# Patient Record
Sex: FEMALE | Race: WHITE | Employment: UNEMPLOYED | URBAN - METROPOLITAN AREA
[De-identification: names, ages, dates, MRNs, and addresses within clinical notes are randomized per-mention and may not be internally consistent; named-entity substitution may affect disease eponyms.]

---

## 2018-01-02 ENCOUNTER — OFFICE VISIT (OUTPATIENT)
Dept: ORTHOPEDIC SURGERY | Age: 53
End: 2018-01-02

## 2018-01-02 VITALS — HEIGHT: 66 IN | RESPIRATION RATE: 16 BRPM | WEIGHT: 189 LBS | BODY MASS INDEX: 30.37 KG/M2

## 2018-01-02 DIAGNOSIS — M77.11 LATERAL EPICONDYLITIS OF RIGHT ELBOW: Primary | ICD-10-CM

## 2018-01-02 DIAGNOSIS — R52 PAIN: ICD-10-CM

## 2018-01-02 PROCEDURE — 99203 OFFICE O/P NEW LOW 30 MIN: CPT | Performed by: PHYSICIAN ASSISTANT

## 2018-01-02 PROCEDURE — 73080 X-RAY EXAM OF ELBOW: CPT | Performed by: PHYSICIAN ASSISTANT

## 2018-01-02 RX ORDER — LEVOTHYROXINE SODIUM 0.1 MG/1
100 TABLET ORAL DAILY
COMMUNITY
End: 2018-02-16 | Stop reason: SDUPTHER

## 2018-01-02 RX ORDER — LISINOPRIL 10 MG/1
10 TABLET ORAL DAILY
COMMUNITY
End: 2018-02-12 | Stop reason: SDUPTHER

## 2018-01-02 RX ORDER — MELOXICAM 7.5 MG/1
7.5 TABLET ORAL DAILY
Qty: 30 TABLET | Refills: 3 | Status: SHIPPED | OUTPATIENT
Start: 2018-01-02 | End: 2018-02-12

## 2018-01-03 ENCOUNTER — TELEPHONE (OUTPATIENT)
Dept: ORTHOPEDIC SURGERY | Age: 53
End: 2018-01-03

## 2018-01-03 NOTE — PROGRESS NOTES
History   Problem Relation Age of Onset    Cancer Mother     Diabetes Mother     Hypertension Mother     Stroke Mother     Thyroid Disease Mother     Kidney Disease Mother     Glaucoma Mother     Cancer Father     Heart Disease Father     Obesity Brother     Thyroid Disease Brother     Kidney Disease Brother        Social History     Social History    Marital status: N/A     Spouse name: N/A    Number of children: N/A    Years of education: N/A     Social History Main Topics    Smoking status: None    Smokeless tobacco: None    Alcohol use None    Drug use: Unknown    Sexual activity: Not Asked     Other Topics Concern    None     Social History Narrative    None         ORTHOPEDIC CONSTITUTION EXAM:     Vital Signs:  Resp 16   Ht 5' 6\" (1.676 m)   Wt 189 lb (85.7 kg)   BMI 30.51 kg/m²     Constitution:  Generally, the patient is [x] alert, [x] appears stated age, and [x] in no distress. General body habitus is:   []Cachectic  []Thin  []Normal  []Overweight  []Obese  []Morbidly Obese     Psychiatric:   Judgement and insight is:  [x]Good    []Poor  Oriented to:  [x]person, [x]place, [x]time. Mood for circumstances is:  [x]Appropriate   []Inappropriate    Gait and Station:   Gait is:  [x]Normal  []Antalgic   []Trendelenburg   []Wide   []Unsteady   []Other:  Assistive Device:  []Cane    []Crutches    []Walker    []Wheelchair    []Gurney  Weight bearing on injured extremity:  [x]Is able   []Is not able    ORTHOPEDIC ELBOW EXAM:     ELBOW EXAM: [x]Right []Left  Circulation:  [x] The limb is warm and well perfused. [x] Capillary refill is intact. [] Edema:    Inspection:  [x] Skin intact without abrasion or lacerations.   [x] Normal elbow alignment:  [] Abnormal alignment:  [] Ecchymosis:  [] Abrasion:  [] Laceration:   [] Scar / Surgical incision:  [] Orthopedic appliance:     Range of Motion:   [x] Normal Elbow AROM     [x] Normal Elbow PROM     [] Deferred due to fracture  Active Elbow

## 2018-01-03 NOTE — TELEPHONE ENCOUNTER
Pt stats that she did not get the opportunity to review MRI results with you yesterday during office visit since results were not available.  Please review and call pt at [phone]

## 2018-01-12 NOTE — TELEPHONE ENCOUNTER
[address] [place] [address] - [address] [place] [address]

## 2018-01-15 ENCOUNTER — HOSPITAL ENCOUNTER (OUTPATIENT)
Dept: OCCUPATIONAL THERAPY | Age: 53
Discharge: HOME | End: 2018-01-31
Attending: PHYSICIAN ASSISTANT | Admitting: PHYSICIAN ASSISTANT

## 2018-01-15 ASSESSMENT — 9 HOLE PEG TEST
TESTTIME_SECONDS: 26.43
TEST_RESULT: FUNCTIONAL
TESTTIME_SECONDS: 23.66

## 2018-01-15 ASSESSMENT — PAIN DESCRIPTION - LOCATION: LOCATION: ELBOW

## 2018-01-17 RX ORDER — ACETAMINOPHEN 325 MG/1
650 TABLET ORAL EVERY 4 HOURS PRN
Qty: 120 TABLET | Refills: 3 | OUTPATIENT
Start: 2018-01-17

## 2018-01-17 RX ORDER — NAPROXEN 500 MG/1
500 TABLET ORAL 2 TIMES DAILY WITH MEALS
Qty: 60 TABLET | Refills: 3 | OUTPATIENT
Start: 2018-01-17

## 2018-02-01 ENCOUNTER — HOSPITAL ENCOUNTER (OUTPATIENT)
Dept: OCCUPATIONAL THERAPY | Age: 53
Discharge: HOME | End: 2018-02-28
Attending: PHYSICIAN ASSISTANT | Admitting: PHYSICIAN ASSISTANT

## 2018-02-02 ENCOUNTER — OFFICE VISIT (OUTPATIENT)
Dept: ORTHOPEDIC SURGERY | Age: 53
End: 2018-02-02

## 2018-02-02 VITALS — RESPIRATION RATE: 16 BRPM | HEIGHT: 66 IN | WEIGHT: 189 LBS | BODY MASS INDEX: 30.37 KG/M2

## 2018-02-02 DIAGNOSIS — M77.11 LATERAL EPICONDYLITIS OF RIGHT ELBOW: Primary | ICD-10-CM

## 2018-02-02 PROCEDURE — 99213 OFFICE O/P EST LOW 20 MIN: CPT | Performed by: ORTHOPAEDIC SURGERY

## 2018-02-02 ASSESSMENT — ENCOUNTER SYMPTOMS
GASTROINTESTINAL NEGATIVE: 1
RESPIRATORY NEGATIVE: 1
EYES NEGATIVE: 1

## 2018-02-02 NOTE — PROGRESS NOTES
finger/wrist extension      MRI RIGHT ELBOW 12/26/17          No x-rays taken in the office today. Impression:  right elbow lateral epicondylitis       Plan:    The most likely impression, expected course, diagnostic and treatment options were discussed, will proceed with:  Natural history and expected course discussed. Questions answered.     Wear strap below elbow as needed   Continue physical therapy   Avoid aggravating activity   Follow up as needed for any persistent symptoms

## 2018-02-12 ENCOUNTER — OFFICE VISIT (OUTPATIENT)
Dept: FAMILY MEDICINE CLINIC | Age: 53
End: 2018-02-12

## 2018-02-12 VITALS
WEIGHT: 209.4 LBS | RESPIRATION RATE: 16 BRPM | HEIGHT: 65 IN | BODY MASS INDEX: 34.89 KG/M2 | HEART RATE: 69 BPM | DIASTOLIC BLOOD PRESSURE: 82 MMHG | SYSTOLIC BLOOD PRESSURE: 128 MMHG | OXYGEN SATURATION: 97 %

## 2018-02-12 DIAGNOSIS — Z00.00 ROUTINE HEALTH MAINTENANCE: Primary | ICD-10-CM

## 2018-02-12 DIAGNOSIS — Z13.1 ENCOUNTER FOR SCREENING FOR DIABETES MELLITUS: ICD-10-CM

## 2018-02-12 DIAGNOSIS — E03.9 HYPOTHYROIDISM, UNSPECIFIED TYPE: ICD-10-CM

## 2018-02-12 DIAGNOSIS — Z12.11 SCREENING FOR COLON CANCER: ICD-10-CM

## 2018-02-12 DIAGNOSIS — I10 ESSENTIAL HYPERTENSION: ICD-10-CM

## 2018-02-12 DIAGNOSIS — Z12.31 ENCOUNTER FOR SCREENING MAMMOGRAM FOR BREAST CANCER: ICD-10-CM

## 2018-02-12 DIAGNOSIS — E04.1 THYROID NODULE: ICD-10-CM

## 2018-02-12 DIAGNOSIS — Z00.00 ROUTINE HEALTH MAINTENANCE: ICD-10-CM

## 2018-02-12 LAB
A/G RATIO: 1.4 (ref 1.1–2.2)
ALBUMIN SERPL-MCNC: 4.2 G/DL (ref 3.4–5)
ALP BLD-CCNC: 77 U/L (ref 40–129)
ALT SERPL-CCNC: 20 U/L (ref 10–40)
ANION GAP SERPL CALCULATED.3IONS-SCNC: 14 MMOL/L (ref 3–16)
AST SERPL-CCNC: 21 U/L (ref 15–37)
BILIRUB SERPL-MCNC: <0.2 MG/DL (ref 0–1)
BUN BLDV-MCNC: 11 MG/DL (ref 7–20)
CALCIUM SERPL-MCNC: 9.2 MG/DL (ref 8.3–10.6)
CHLORIDE BLD-SCNC: 104 MMOL/L (ref 99–110)
CHOLESTEROL, TOTAL: 191 MG/DL (ref 0–199)
CO2: 24 MMOL/L (ref 21–32)
CREAT SERPL-MCNC: 0.8 MG/DL (ref 0.6–1.1)
GFR AFRICAN AMERICAN: >60
GFR NON-AFRICAN AMERICAN: >60
GLOBULIN: 3.1 G/DL
GLUCOSE BLD-MCNC: 91 MG/DL (ref 70–99)
HCT VFR BLD CALC: 43.3 % (ref 36–48)
HDLC SERPL-MCNC: 34 MG/DL (ref 40–60)
HEMOGLOBIN: 15 G/DL (ref 12–16)
HEPATITIS C ANTIBODY INTERPRETATION: NORMAL
LDL CHOLESTEROL CALCULATED: 121 MG/DL
MCH RBC QN AUTO: 30.9 PG (ref 26–34)
MCHC RBC AUTO-ENTMCNC: 34.6 G/DL (ref 31–36)
MCV RBC AUTO: 89.2 FL (ref 80–100)
PDW BLD-RTO: 13.4 % (ref 12.4–15.4)
PLATELET # BLD: 241 K/UL (ref 135–450)
PMV BLD AUTO: 9.5 FL (ref 5–10.5)
POTASSIUM SERPL-SCNC: 4.5 MMOL/L (ref 3.5–5.1)
RBC # BLD: 4.85 M/UL (ref 4–5.2)
SODIUM BLD-SCNC: 142 MMOL/L (ref 136–145)
TOTAL PROTEIN: 7.3 G/DL (ref 6.4–8.2)
TRIGL SERPL-MCNC: 178 MG/DL (ref 0–150)
TSH REFLEX: 0.83 UIU/ML (ref 0.27–4.2)
VLDLC SERPL CALC-MCNC: 36 MG/DL
WBC # BLD: 8.6 K/UL (ref 4–11)

## 2018-02-12 PROCEDURE — 3017F COLORECTAL CA SCREEN DOC REV: CPT | Performed by: NURSE PRACTITIONER

## 2018-02-12 PROCEDURE — 99213 OFFICE O/P EST LOW 20 MIN: CPT | Performed by: NURSE PRACTITIONER

## 2018-02-12 PROCEDURE — 4004F PT TOBACCO SCREEN RCVD TLK: CPT | Performed by: NURSE PRACTITIONER

## 2018-02-12 PROCEDURE — G8417 CALC BMI ABV UP PARAM F/U: HCPCS | Performed by: NURSE PRACTITIONER

## 2018-02-12 PROCEDURE — 3014F SCREEN MAMMO DOC REV: CPT | Performed by: NURSE PRACTITIONER

## 2018-02-12 PROCEDURE — G8484 FLU IMMUNIZE NO ADMIN: HCPCS | Performed by: NURSE PRACTITIONER

## 2018-02-12 PROCEDURE — G8427 DOCREV CUR MEDS BY ELIG CLIN: HCPCS | Performed by: NURSE PRACTITIONER

## 2018-02-12 RX ORDER — LISINOPRIL 10 MG/1
10 TABLET ORAL DAILY
Qty: 90 TABLET | Refills: 1 | Status: SHIPPED | OUTPATIENT
Start: 2018-02-12 | End: 2018-05-14 | Stop reason: SDUPTHER

## 2018-02-12 ASSESSMENT — ENCOUNTER SYMPTOMS
WHEEZING: 0
NAUSEA: 0
COUGH: 0
SHORTNESS OF BREATH: 0
VOMITING: 0
CONSTIPATION: 0
CHEST TIGHTNESS: 0
DIARRHEA: 0
ABDOMINAL PAIN: 0

## 2018-02-12 ASSESSMENT — PATIENT HEALTH QUESTIONNAIRE - PHQ9
2. FEELING DOWN, DEPRESSED OR HOPELESS: 0
SUM OF ALL RESPONSES TO PHQ QUESTIONS 1-9: 0
1. LITTLE INTEREST OR PLEASURE IN DOING THINGS: 0
SUM OF ALL RESPONSES TO PHQ9 QUESTIONS 1 & 2: 0

## 2018-02-12 NOTE — PROGRESS NOTES
SUBJECTIVE:    Yvrose Watts  1965  46 y.o.  female      Chief Complaint   Patient presents with    Establish Care     HPI    Allergies   Allergen Reactions    Meloxicam Shortness Of Breath    Keflex [Cephalexin]     Penicillins     Statins     Sulfa Antibiotics     Codeine Nausea And Vomiting     Past Medical History:   Diagnosis Date    Hypertension     Hypothyroidism      Past Surgical History:   Procedure Laterality Date    CARPAL TUNNEL RELEASE Bilateral 2003    CHOLECYSTECTOMY      CHOLECYSTECTOMY  2013    ECTOPIC PREGNANCY SURGERY  1991    TUBAL LIGATION  1991     Social History     Tobacco History     Smoking Status  Current Every Day Smoker Smoking Frequency  0.5 packs/day for 20 years (10 pk yrs) Smoking Tobacco Type  Cigarettes    Smokeless Tobacco Use  Never Used          Alcohol History     Alcohol Use Status  No          Drug Use     Drug Use Status  No          Sexual Activity     Sexually Active  Yes Partners  Male                Problem List Items Addressed This Visit     Hypothyroidism     Diagnosed approximately 10 years ago  Stable at current dosage for most of that time  Denies heat or cold intolerance  Feels as though her hair is thinning    Thyroid \"feels off\", \"lumpy\"  Noticed it 4-5 months ago         Essential hypertension     Diagnosed approximately 10 years ago  Stable at current dosage for many years  Patient denies any exertional chest pain, dyspnea, palpitations, syncope, orthopnea, edema or paroxysmal nocturnal dyspnea.            Relevant Medications    lisinopril (PRINIVIL;ZESTRIL) 10 MG tablet      Other Visit Diagnoses     Routine health maintenance    -  Primary    Relevant Orders    CBC    Comprehensive Metabolic Panel    Lipid Panel    TSH with Reflex    Hepatitis C Antibody    HIV Screen    Encounter for screening mammogram for breast cancer        Relevant Orders    JING Digital Screen Bilateral [[id number]]    Screening for colon cancer        Relevant Orders no edema. Lymphadenopathy:     She has no cervical adenopathy. Right cervical: No superficial cervical and no posterior cervical adenopathy present. Left cervical: No superficial cervical and no posterior cervical adenopathy present. Neurological: She is alert and oriented to person, place, and time. No cranial nerve deficit. Skin: Skin is warm and dry. Psychiatric: She has a normal mood and affect. Her behavior is normal.       ASSESSMENT/PLAN:    1. Encounter for screening mammogram for breast cancer  - JING Digital Screen Bilateral [[id number]]; Future    2. Screening for colon cancer  - POCT FECAL IMMUNOCHEMICAL TEST (FIT); Future  - [address] [facility]- Yunior Richardson MD    3. Encounter for screening for diabetes mellitus    4. Hypothyroidism, unspecified type  Recheck today  Will hold off on refills until see tsh results    5. Essential hypertension  Lisinopril refilled    6. Routine health maintenance  - CBC; Future  - Comprehensive Metabolic Panel; Future  - Lipid Panel; Future  - TSH with Reflex; Future  - Hepatitis C Antibody; Future  - HIV Screen; Future    7. Thyroid nodule  - US Head Neck Soft Tissue Thyroid; Future      Controlled Substances Monitoring:          Return in about 3 months (around 5/12/2018).       (Please note that portions of this note may have been completed with a voice recognition program. Efforts were made to edit the dictations but occasionally words are mis-transcribed.)

## 2018-02-13 LAB
HIV AG/AB: NORMAL
HIV ANTIGEN: NORMAL
HIV-1 ANTIBODY: NORMAL
HIV-2 AB: NORMAL

## 2018-02-16 ENCOUNTER — TELEPHONE (OUTPATIENT)
Dept: FAMILY MEDICINE CLINIC | Age: 53
End: 2018-02-16

## 2018-02-19 RX ORDER — LEVOTHYROXINE SODIUM 0.1 MG/1
100 TABLET ORAL DAILY
Qty: 90 TABLET | Refills: 1 | Status: SHIPPED | OUTPATIENT
Start: 2018-02-19 | End: 2018-05-14 | Stop reason: SDUPTHER

## 2018-02-19 RX ORDER — EZETIMIBE 10 MG/1
10 TABLET ORAL DAILY
Qty: 30 TABLET | Refills: 3 | Status: SHIPPED | OUTPATIENT
Start: 2018-02-19 | End: 2018-05-14 | Stop reason: SDUPTHER

## 2018-02-19 NOTE — TELEPHONE ENCOUNTER
Will try zetia to help lower her cholesterol and decrease her CVD risk    The 10-year ASCVD risk score (Brooke Hector et al., 2013) is: 10.4%    Values used to calculate the score:      Age: 46 years      Sex: Female      Is Non- : No      Diabetic: No      Tobacco smoker: Yes      Systolic Blood Pressure: 233 mmHg      Is BP treated: Yes      HDL Cholesterol: 34 mg/dL      Total Cholesterol: 191 mg/dL      Patient should also make dietary changes to improve cholesterol.  Will also benefit from physical activity

## 2018-02-21 ENCOUNTER — TELEPHONE (OUTPATIENT)
Dept: GASTROENTEROLOGY | Age: 53
End: 2018-02-21

## 2018-02-21 DIAGNOSIS — Z12.11 SCREENING FOR COLON CANCER: ICD-10-CM

## 2018-02-21 LAB
CONTROL: PRESENT
HEMOCCULT STL QL: NEGATIVE

## 2018-02-22 ENCOUNTER — HOSPITAL ENCOUNTER (OUTPATIENT)
Dept: MAMMOGRAPHY | Age: 53
Discharge: HOME | End: 2018-02-22
Attending: NURSE PRACTITIONER | Admitting: NURSE PRACTITIONER

## 2018-02-22 DIAGNOSIS — E04.1 THYROID NODULE: ICD-10-CM

## 2018-02-22 DIAGNOSIS — Z12.31 ENCOUNTER FOR SCREENING MAMMOGRAM FOR BREAST CANCER: ICD-10-CM

## 2018-02-22 DIAGNOSIS — Z12.31 ENCOUNTER FOR SCREENING MAMMOGRAM FOR MALIGNANT NEOPLASM OF BREAST: ICD-10-CM

## 2018-02-22 DIAGNOSIS — E04.9 ENLARGED THYROID: Primary | ICD-10-CM

## 2018-02-27 ENCOUNTER — TELEPHONE (OUTPATIENT)
Dept: FAMILY MEDICINE CLINIC | Age: 53
End: 2018-02-27

## 2018-02-28 ENCOUNTER — TELEPHONE (OUTPATIENT)
Dept: FAMILY MEDICINE CLINIC | Age: 53
End: 2018-02-28

## 2018-02-28 RX ORDER — FENOFIBRATE 145 MG/1
145 TABLET, COATED ORAL DAILY
Qty: 30 TABLET | Refills: 3 | Status: SHIPPED | OUTPATIENT
Start: 2018-02-28 | End: 2018-05-14 | Stop reason: CLARIF

## 2018-02-28 NOTE — TELEPHONE ENCOUNTER
Patient called back and left DENA stating [address] advised her both cholesterol medications Zetia & Tricor are not covered. The cost of Zetia is $80 & Tricor $140. Patient will be going on the  website to see if she can download a coupon to help with cost. She will be paying for the Zetia today, because she needs it and only due to getting her taxes back could she afford to. Patient is requesting that we keep trying to get either medication approved with her insurance company.

## 2018-02-28 NOTE — TELEPHONE ENCOUNTER
Patient called in, stated that she wants on a prescription and wants it now because she is stressing out bad. She stated she just found out today that mom had Graves and multiple heart attacks. She wants to know what PCP is willing to do for her because she does not want to go without medication.

## 2018-03-01 ENCOUNTER — HOSPITAL ENCOUNTER (OUTPATIENT)
Dept: OCCUPATIONAL THERAPY | Age: 53
Discharge: HOME | End: 2018-03-31
Attending: PHYSICIAN ASSISTANT | Admitting: PHYSICIAN ASSISTANT

## 2018-03-05 ENCOUNTER — TELEPHONE (OUTPATIENT)
Dept: FAMILY MEDICINE CLINIC | Age: 53
End: 2018-03-05

## 2018-03-05 NOTE — TELEPHONE ENCOUNTER
Patient called stating that she received a denial letter from [facility] for the Generic form of Zetia. States on the letter, that she needs to try statins first. Patient states she is allergic to all statins. [facility] told her to have us send over a letter with our letterhead on it, stating she is allergic to all statins and they would review and make a decision within 24 hours on the Generic Zetia. She said the RX Dept Fax# is [phone].

## 2018-03-13 DIAGNOSIS — E04.9 ENLARGEMENT OF THYROID: Primary | ICD-10-CM

## 2018-04-01 ENCOUNTER — HOSPITAL ENCOUNTER (OUTPATIENT)
Dept: OCCUPATIONAL THERAPY | Age: 53
Discharge: HOME | End: 2018-04-30
Attending: PHYSICIAN ASSISTANT | Admitting: PHYSICIAN ASSISTANT

## 2018-04-13 ENCOUNTER — OFFICE VISIT (OUTPATIENT)
Dept: ORTHOPEDIC SURGERY | Age: 53
End: 2018-04-13

## 2018-04-13 VITALS — RESPIRATION RATE: 14 BRPM | WEIGHT: 200 LBS | HEIGHT: 66 IN | BODY MASS INDEX: 32.14 KG/M2

## 2018-04-13 DIAGNOSIS — M77.11 LATERAL EPICONDYLITIS OF RIGHT ELBOW: Primary | ICD-10-CM

## 2018-04-13 PROCEDURE — 20605 DRAIN/INJ JOINT/BURSA W/O US: CPT | Performed by: ORTHOPAEDIC SURGERY

## 2018-04-13 PROCEDURE — 3014F SCREEN MAMMO DOC REV: CPT | Performed by: ORTHOPAEDIC SURGERY

## 2018-04-13 PROCEDURE — G8427 DOCREV CUR MEDS BY ELIG CLIN: HCPCS | Performed by: ORTHOPAEDIC SURGERY

## 2018-04-13 PROCEDURE — 20551 NJX 1 TENDON ORIGIN/INSJ: CPT | Performed by: ORTHOPAEDIC SURGERY

## 2018-04-13 PROCEDURE — G8417 CALC BMI ABV UP PARAM F/U: HCPCS | Performed by: ORTHOPAEDIC SURGERY

## 2018-04-13 PROCEDURE — 3017F COLORECTAL CA SCREEN DOC REV: CPT | Performed by: ORTHOPAEDIC SURGERY

## 2018-04-13 PROCEDURE — 4004F PT TOBACCO SCREEN RCVD TLK: CPT | Performed by: ORTHOPAEDIC SURGERY

## 2018-04-13 ASSESSMENT — ENCOUNTER SYMPTOMS
GASTROINTESTINAL NEGATIVE: 1
EYES NEGATIVE: 1
RESPIRATORY NEGATIVE: 1

## 2018-04-25 ENCOUNTER — TELEPHONE (OUTPATIENT)
Dept: ORTHOPEDIC SURGERY | Age: 53
End: 2018-04-25

## 2018-05-01 ENCOUNTER — HOSPITAL ENCOUNTER (OUTPATIENT)
Dept: OCCUPATIONAL THERAPY | Age: 53
Discharge: HOME | End: 2018-05-31
Attending: PHYSICIAN ASSISTANT | Admitting: PHYSICIAN ASSISTANT

## 2018-05-14 ENCOUNTER — OFFICE VISIT (OUTPATIENT)
Dept: FAMILY MEDICINE CLINIC | Age: 53
End: 2018-05-14

## 2018-05-14 VITALS
HEART RATE: 67 BPM | RESPIRATION RATE: 16 BRPM | WEIGHT: 210.4 LBS | SYSTOLIC BLOOD PRESSURE: 128 MMHG | OXYGEN SATURATION: 98 % | BODY MASS INDEX: 33.96 KG/M2 | DIASTOLIC BLOOD PRESSURE: 82 MMHG

## 2018-05-14 DIAGNOSIS — Z00.00 ROUTINE HEALTH MAINTENANCE: Primary | ICD-10-CM

## 2018-05-14 DIAGNOSIS — Z82.49 FAMILY HISTORY OF HYPERTROPHIC CARDIOMYOPATHY: ICD-10-CM

## 2018-05-14 DIAGNOSIS — I10 ESSENTIAL HYPERTENSION: ICD-10-CM

## 2018-05-14 DIAGNOSIS — Z23 NEED FOR PROPHYLACTIC VACCINATION AGAINST STREPTOCOCCUS PNEUMONIAE (PNEUMOCOCCUS): ICD-10-CM

## 2018-05-14 DIAGNOSIS — Z00.00 ROUTINE HEALTH MAINTENANCE: ICD-10-CM

## 2018-05-14 DIAGNOSIS — E03.9 HYPOTHYROIDISM, UNSPECIFIED TYPE: ICD-10-CM

## 2018-05-14 DIAGNOSIS — Z72.0 TOBACCO ABUSE: ICD-10-CM

## 2018-05-14 LAB
CHOLESTEROL, TOTAL: 162 MG/DL (ref 0–199)
HDLC SERPL-MCNC: 37 MG/DL (ref 40–60)
LDL CHOLESTEROL CALCULATED: 97 MG/DL
TRIGL SERPL-MCNC: 141 MG/DL (ref 0–150)
TSH REFLEX: 1.46 UIU/ML (ref 0.27–4.2)
VLDLC SERPL CALC-MCNC: 28 MG/DL

## 2018-05-14 PROCEDURE — 99214 OFFICE O/P EST MOD 30 MIN: CPT | Performed by: NURSE PRACTITIONER

## 2018-05-14 PROCEDURE — 90471 IMMUNIZATION ADMIN: CPT | Performed by: NURSE PRACTITIONER

## 2018-05-14 PROCEDURE — 3017F COLORECTAL CA SCREEN DOC REV: CPT | Performed by: NURSE PRACTITIONER

## 2018-05-14 PROCEDURE — 3014F SCREEN MAMMO DOC REV: CPT | Performed by: NURSE PRACTITIONER

## 2018-05-14 PROCEDURE — 4004F PT TOBACCO SCREEN RCVD TLK: CPT | Performed by: NURSE PRACTITIONER

## 2018-05-14 PROCEDURE — G8427 DOCREV CUR MEDS BY ELIG CLIN: HCPCS | Performed by: NURSE PRACTITIONER

## 2018-05-14 PROCEDURE — G8417 CALC BMI ABV UP PARAM F/U: HCPCS | Performed by: NURSE PRACTITIONER

## 2018-05-14 PROCEDURE — 90732 PPSV23 VACC 2 YRS+ SUBQ/IM: CPT | Performed by: NURSE PRACTITIONER

## 2018-05-14 RX ORDER — OMEPRAZOLE 40 MG/1
CAPSULE, DELAYED RELEASE ORAL
COMMUNITY
Start: 2018-04-05 | End: 2018-05-14 | Stop reason: CLARIF

## 2018-05-14 RX ORDER — LISINOPRIL 10 MG/1
10 TABLET ORAL DAILY
Qty: 90 TABLET | Refills: 1 | Status: SHIPPED | OUTPATIENT
Start: 2018-05-14 | End: 2018-08-28 | Stop reason: SDUPTHER

## 2018-05-14 RX ORDER — LEVOTHYROXINE SODIUM 0.1 MG/1
100 TABLET ORAL DAILY
Qty: 90 TABLET | Refills: 1 | Status: SHIPPED | OUTPATIENT
Start: 2018-05-14 | End: 2019-02-07 | Stop reason: SDUPTHER

## 2018-05-14 RX ORDER — EZETIMIBE 10 MG/1
10 TABLET ORAL DAILY
Qty: 30 TABLET | Refills: 3 | Status: SHIPPED | OUTPATIENT
Start: 2018-05-14 | End: 2018-08-28 | Stop reason: SDUPTHER

## 2018-05-14 ASSESSMENT — ENCOUNTER SYMPTOMS
NAUSEA: 0
VOMITING: 0
CHEST TIGHTNESS: 0
CONSTIPATION: 0
DIARRHEA: 0
ABDOMINAL PAIN: 0
COUGH: 1
SHORTNESS OF BREATH: 0

## 2018-05-25 ENCOUNTER — OFFICE VISIT (OUTPATIENT)
Dept: ORTHOPEDIC SURGERY | Age: 53
End: 2018-05-25

## 2018-05-25 VITALS — HEIGHT: 66 IN | WEIGHT: 210 LBS | BODY MASS INDEX: 33.75 KG/M2 | RESPIRATION RATE: 16 BRPM

## 2018-05-25 DIAGNOSIS — R52 PAIN: ICD-10-CM

## 2018-05-25 DIAGNOSIS — M77.11 LATERAL EPICONDYLITIS OF RIGHT ELBOW: Primary | ICD-10-CM

## 2018-05-25 PROCEDURE — 4004F PT TOBACCO SCREEN RCVD TLK: CPT | Performed by: ORTHOPAEDIC SURGERY

## 2018-05-25 PROCEDURE — G8417 CALC BMI ABV UP PARAM F/U: HCPCS | Performed by: ORTHOPAEDIC SURGERY

## 2018-05-25 PROCEDURE — 3017F COLORECTAL CA SCREEN DOC REV: CPT | Performed by: ORTHOPAEDIC SURGERY

## 2018-05-25 PROCEDURE — G8427 DOCREV CUR MEDS BY ELIG CLIN: HCPCS | Performed by: ORTHOPAEDIC SURGERY

## 2018-05-25 PROCEDURE — 3014F SCREEN MAMMO DOC REV: CPT | Performed by: ORTHOPAEDIC SURGERY

## 2018-05-25 PROCEDURE — 99213 OFFICE O/P EST LOW 20 MIN: CPT | Performed by: ORTHOPAEDIC SURGERY

## 2018-05-25 ASSESSMENT — ENCOUNTER SYMPTOMS
RESPIRATORY NEGATIVE: 1
EYES NEGATIVE: 1
GASTROINTESTINAL NEGATIVE: 1

## 2018-06-01 ENCOUNTER — HOSPITAL ENCOUNTER (OUTPATIENT)
Dept: OCCUPATIONAL THERAPY | Age: 53
Discharge: HOME | End: 2018-06-30
Attending: PHYSICIAN ASSISTANT | Admitting: PHYSICIAN ASSISTANT

## 2018-07-01 ENCOUNTER — HOSPITAL ENCOUNTER (OUTPATIENT)
Dept: OCCUPATIONAL THERAPY | Age: 53
Discharge: HOME | End: 2018-07-31
Attending: PHYSICIAN ASSISTANT | Admitting: PHYSICIAN ASSISTANT

## 2018-07-31 ENCOUNTER — INITIAL CONSULT (OUTPATIENT)
Dept: CARDIOLOGY CLINIC | Age: 53
End: 2018-07-31

## 2018-07-31 ENCOUNTER — NURSE ONLY (OUTPATIENT)
Dept: CARDIOLOGY CLINIC | Age: 53
End: 2018-07-31

## 2018-07-31 VITALS
BODY MASS INDEX: 33.11 KG/M2 | WEIGHT: 206 LBS | HEIGHT: 66 IN | SYSTOLIC BLOOD PRESSURE: 116 MMHG | HEART RATE: 80 BPM | DIASTOLIC BLOOD PRESSURE: 78 MMHG

## 2018-07-31 DIAGNOSIS — E03.9 ACQUIRED HYPOTHYROIDISM: ICD-10-CM

## 2018-07-31 DIAGNOSIS — Z72.0 TOBACCO ABUSE: ICD-10-CM

## 2018-07-31 DIAGNOSIS — R00.0 TACHYCARDIA: Primary | ICD-10-CM

## 2018-07-31 DIAGNOSIS — I10 ESSENTIAL HYPERTENSION: ICD-10-CM

## 2018-07-31 DIAGNOSIS — E78.5 HYPERLIPIDEMIA LDL GOAL <130: ICD-10-CM

## 2018-07-31 PROCEDURE — 3017F COLORECTAL CA SCREEN DOC REV: CPT | Performed by: INTERNAL MEDICINE

## 2018-07-31 PROCEDURE — 3014F SCREEN MAMMO DOC REV: CPT | Performed by: INTERNAL MEDICINE

## 2018-07-31 PROCEDURE — 93000 ELECTROCARDIOGRAM COMPLETE: CPT | Performed by: INTERNAL MEDICINE

## 2018-07-31 PROCEDURE — 99203 OFFICE O/P NEW LOW 30 MIN: CPT | Performed by: INTERNAL MEDICINE

## 2018-07-31 PROCEDURE — G8427 DOCREV CUR MEDS BY ELIG CLIN: HCPCS | Performed by: INTERNAL MEDICINE

## 2018-07-31 PROCEDURE — 93270 REMOTE 30 DAY ECG REV/REPORT: CPT | Performed by: INTERNAL MEDICINE

## 2018-07-31 PROCEDURE — G8417 CALC BMI ABV UP PARAM F/U: HCPCS | Performed by: INTERNAL MEDICINE

## 2018-07-31 NOTE — PROGRESS NOTES
Unremarkable. NECK: No JVP or thyromegaly  Cardiovascular: Auscultation: Normal S1 and S2. No muscle gallops noted. .  Carotids are negative for bruits. .  Abdominal aorta is nonpalpable. No epigastric bruit noted. Peripheral pulses: 2+ equal in both feet  Respiratory:  Respiratory effort is normal.  Breath sounds are clear to auscultation. Extremities:  No edema, clubbing,  Cyanosis, petechiae. SKIN: Warm and well perfused, no pallor or cyanosis  Abdomen:  No masses or tenderness. No organomegaly noted. Musculoskeletal:  No spinal deformities noted. Gait is normal.  Muscle strength is normal.  Neurologic:  Oriented to time, place, and person and non-anxious. No focal neurological deficit noted. Psychiatric: Normal mood and effect. LAB REVIEW:  CBC:   Lab Results   Component Value Date    WBC 8.6 02/12/2018    HGB 15.0 02/12/2018    HCT 43.3 02/12/2018     02/12/2018     Lipids:   Lab Results   Component Value Date    CHOL 162 05/14/2018    TRIG 141 05/14/2018    HDL 37 (L) 05/14/2018    LDLCALC 97 05/14/2018     TSH 1.46      Renal:   Lab Results   Component Value Date    BUN 11 02/12/2018    CREATININE 0.8 02/12/2018     02/12/2018    K 4.5 02/12/2018     EKG:  Normal sinus rhythm 80 bpm.  Essentially normal ECG. Assessment / Recommendations:   Diagnosis Orders   1. Tachycardia  EKG 12 Lead    EKG 12 Lead    ECHO Complete 2D W Doppler W Color    (Gxt) Stress Test Exercise W Out Myoview    Cardiac event monitor   2. Essential hypertension  Well-controlled on current medications. As she has lost significant amount of weight she can decrease lisinopril to 5 mg daily and continue to lose weight. 3. Tobacco abuse  Counseled for smoking cessation and help his offered   4. Acquired hypothyroidism  Continue thyroid supplementation as her recent TSH was within normal limits. 5. Hyperlipidemia LDL goal <130  Continue Zetia for now.   Her recent LDL was 97.       14 days of event monitor and Echo to assess symptoms. Counseled to quit smoking. Obtain records from [facility]. Continue current cardiovascular medications which have been reviewed and discussed individually with you. Counseled for smoking cessation and weight loss. Primary prevention is the goal by aggressive risk modification, healthy and therapeutic life style changes for cardiovascular risk reduction. Various goals are discussed and questions answered. Follow up in office in with stress test,. Multiple questions answered. Patient verbalizes understanding and asks relevant questions. Vanita Lantigua MD, 7/31/2018 2:14 PM     Please note this report has been produced using speech recognition software and may contain errors related to that system including errors in grammar, punctuation, and spelling, as well as words and phrases that may be inappropriate.  If there are any questions or concerns please feel free to contact the dictating provider for clarification

## 2018-07-31 NOTE — PATIENT INSTRUCTIONS
14 days of event monitor and Echo to assess symptoms. Counseled to quit smoking. Obtain records from [facility]. Continue current cardiovascular medications which have been reviewed and discussed individually with you. Counseled for smoking cessation and weight loss. Primary prevention is the goal by aggressive risk modification, healthy and therapeutic life style changes for cardiovascular risk reduction. Various goals are discussed and questions answered. Follow up in office in with stress test,. Multiple questions answered. Patient verbalizes understanding and asks relevant questions.

## 2018-07-31 NOTE — PROGRESS NOTES
14 days e-cardio monitor placed.  # [id number]. Instructed patient on how to record the event and to call monitoring center at [phone] if any problems arise. Instructed patient to disconnect the lead wires from the electrodes before bathing or showering and reattach them afterwards. Instructed patient that the electrodes should be changed every 3 days or if they no longer adhere to the skin. Patient to mail package after the monitor has ended. Patient verbalized understanding.

## 2018-08-14 PROCEDURE — 93272 ECG/REVIEW INTERPRET ONLY: CPT | Performed by: INTERNAL MEDICINE

## 2018-08-15 ENCOUNTER — TELEPHONE (OUTPATIENT)
Dept: CARDIOLOGY CLINIC | Age: 53
End: 2018-08-15

## 2018-08-28 ENCOUNTER — PROCEDURE VISIT (OUTPATIENT)
Dept: CARDIOLOGY CLINIC | Age: 53
End: 2018-08-28

## 2018-08-28 ENCOUNTER — OFFICE VISIT (OUTPATIENT)
Dept: CARDIOLOGY CLINIC | Age: 53
End: 2018-08-28

## 2018-08-28 VITALS
HEART RATE: 81 BPM | SYSTOLIC BLOOD PRESSURE: 118 MMHG | WEIGHT: 204 LBS | BODY MASS INDEX: 32.78 KG/M2 | HEIGHT: 66 IN | DIASTOLIC BLOOD PRESSURE: 80 MMHG | RESPIRATION RATE: 16 BRPM

## 2018-08-28 DIAGNOSIS — R00.0 TACHYCARDIA: Primary | ICD-10-CM

## 2018-08-28 DIAGNOSIS — I10 ESSENTIAL HYPERTENSION: ICD-10-CM

## 2018-08-28 DIAGNOSIS — Z72.0 TOBACCO ABUSE: Primary | ICD-10-CM

## 2018-08-28 DIAGNOSIS — E78.5 HYPERLIPIDEMIA LDL GOAL <130: ICD-10-CM

## 2018-08-28 LAB
LV EF: 58 %
LVEF MODALITY: NORMAL

## 2018-08-28 PROCEDURE — 4004F PT TOBACCO SCREEN RCVD TLK: CPT | Performed by: INTERNAL MEDICINE

## 2018-08-28 PROCEDURE — G8417 CALC BMI ABV UP PARAM F/U: HCPCS | Performed by: INTERNAL MEDICINE

## 2018-08-28 PROCEDURE — 99213 OFFICE O/P EST LOW 20 MIN: CPT | Performed by: INTERNAL MEDICINE

## 2018-08-28 PROCEDURE — G8427 DOCREV CUR MEDS BY ELIG CLIN: HCPCS | Performed by: INTERNAL MEDICINE

## 2018-08-28 PROCEDURE — 3017F COLORECTAL CA SCREEN DOC REV: CPT | Performed by: INTERNAL MEDICINE

## 2018-08-28 PROCEDURE — 93306 TTE W/DOPPLER COMPLETE: CPT | Performed by: INTERNAL MEDICINE

## 2018-08-28 PROCEDURE — 3014F SCREEN MAMMO DOC REV: CPT | Performed by: INTERNAL MEDICINE

## 2018-08-28 PROCEDURE — 93015 CV STRESS TEST SUPVJ I&R: CPT | Performed by: INTERNAL MEDICINE

## 2018-08-28 RX ORDER — LISINOPRIL 10 MG/1
10 TABLET ORAL DAILY
Qty: 90 TABLET | Refills: 3 | Status: SHIPPED | OUTPATIENT
Start: 2018-08-28 | End: 2019-02-07 | Stop reason: ALTCHOICE

## 2018-08-28 RX ORDER — EZETIMIBE 10 MG/1
10 TABLET ORAL DAILY
Qty: 30 TABLET | Refills: 3 | Status: SHIPPED | OUTPATIENT
Start: 2018-08-28 | End: 2019-02-07 | Stop reason: SDUPTHER

## 2018-08-28 NOTE — PATIENT INSTRUCTIONS
Primary prevention is the goal by aggressive risk modification, healthy and therapeutic life style changes for cardiovascular risk reduction. Various goals are discussed and questions answered. Appropriate prescriptions if needed on this visit are addressed. After visit summery is provided. Questions answered and patient verbalizes understanding. Follow up with PCP and see me as needed.

## 2018-08-28 NOTE — PROGRESS NOTES
08/28/18 1454   BP: 118/80   Site: Left Arm   Position: Sitting   Cuff Size: Medium Adult   Pulse: 81   Resp: 16   Weight: 204 lb (92.5 kg)   Height: 5' 6\" (1.676 m)      Body mass index is 32.93 kg/m². Wt Readings from Last 3 Encounters:   08/28/18 204 lb (92.5 kg)   07/31/18 206 lb (93.4 kg)   07/23/18 180 lb (81.6 kg)     Constitutional: Mildly overweight pleasant female in no apparent distress. Eyes: Pupils are equal in both eyes. No conjunctival pallor noted. ENT: Unremarkable. NECK: No JVP or thyromegaly  Cardiovascular: Auscultation: Normal S1 and S2. No muscle gallops noted. .  Carotids are negative for bruits. .  Abdominal aorta is nonpalpable. No epigastric bruit noted. Peripheral pulses: 2+ equal in both feet  Respiratory:  Respiratory effort is normal.  Breath sounds are clear to auscultation. Extremities:  No edema, clubbing,  Cyanosis, petechiae. SKIN: Warm and well perfused, no pallor or cyanosis  Abdomen:  No masses or tenderness. No organomegaly noted. Musculoskeletal:  No spinal deformities noted. Gait is normal.  Muscle strength is normal.    8/14/2018 30 days of cardiac event monitor of technically good quality is consistent with normal sinus rhythm and occasional ventricular and supraventricular ectopy. Patient reported symptoms of fluttering and rapid heartbeat during normal rate and rhythm and during sinus tachycardia. Patient reported feeling fast heartbeat while standing on August 3, 2008 18 at 10 AM during sinus tachycardia with a rate of 144 bpm and again on August 6, 2018 at 8:20 AM and 10:16 AM and the rate was 126 -138 bpm.  No other significant arrhythmias are noted. Patient reported fatigue and titrating during normal sinus rhythm 94 bpm with occasional PVC. ECHO today reported   Left ventricular systolic function is normal with an ejection fraction of   55-60%. Grade I diastolic dysfunction. No significant valvular disease noted.    No evidence of pericardial effusion. Stress test today reported  Near maximal study negative for angina or ECG evidence of ischemia.   Exercise tolerance is diminished for age. She stopped for shortness of   breath.  Blood pressure response to exercise is appropriate. LAB REVIEW:    CBC:   Lab Results   Component Value Date    WBC 8.6 02/12/2018    HGB 15.0 02/12/2018    HCT 43.3 02/12/2018     02/12/2018     Lipids:   Lab Results   Component Value Date    CHOL 162 05/14/2018    TRIG 141 05/14/2018    HDL 37 (L) 05/14/2018    LDLCALC 97 05/14/2018     Renal:   Lab Results   Component Value Date    BUN 11 02/12/2018    CREATININE 0.8 02/12/2018     02/12/2018    K 4.5 02/12/2018       IMPRESSION and RECOMMENDATIONS:      1. Tobacco abuse  She is weaning herself off of cigarettes and very optimistic she'll be able to quit the next few months. 2. Hyperlipidemia LDL goal <130  Continue current medications last LDL was 97.    3. Essential hypertension  Well-controlled on current medications continue the same. Primary prevention is the goal by aggressive risk modification, healthy and therapeutic life style changes for cardiovascular risk reduction. Various goals are discussed and questions answered. Appropriate prescriptions if needed on this visit are addressed. After visit summery is provided. Questions answered and patient verbalizes understanding. Follow up with PCP and see me as needed. Lynsey Wyatt MD, 8/28/2018 3:32 PM     Please note this report has been partially produced using speech recognition software and may contain errors related to that system including errors in grammar, punctuation, and spelling, as well as words and phrases that may be inappropriate. If there are any questions or concerns please feel free to contact the dictating provider for clarification.

## 2018-12-10 ENCOUNTER — OFFICE VISIT (OUTPATIENT)
Dept: FAMILY MEDICINE CLINIC | Age: 53
End: 2018-12-10
Payer: COMMERCIAL

## 2018-12-10 VITALS
HEART RATE: 84 BPM | WEIGHT: 213.6 LBS | SYSTOLIC BLOOD PRESSURE: 122 MMHG | BODY MASS INDEX: 34.48 KG/M2 | RESPIRATION RATE: 16 BRPM | OXYGEN SATURATION: 99 % | DIASTOLIC BLOOD PRESSURE: 80 MMHG

## 2018-12-10 DIAGNOSIS — Z72.0 TOBACCO ABUSE: ICD-10-CM

## 2018-12-10 DIAGNOSIS — I10 ESSENTIAL HYPERTENSION: ICD-10-CM

## 2018-12-10 DIAGNOSIS — Z23 FLU VACCINE NEED: ICD-10-CM

## 2018-12-10 DIAGNOSIS — E04.1 THYROID NODULE: Primary | ICD-10-CM

## 2018-12-10 DIAGNOSIS — E03.9 ACQUIRED HYPOTHYROIDISM: ICD-10-CM

## 2018-12-10 DIAGNOSIS — R06.2 WHEEZING: ICD-10-CM

## 2018-12-10 LAB — TSH REFLEX: 1.13 UIU/ML (ref 0.27–4.2)

## 2018-12-10 PROCEDURE — 36415 COLL VENOUS BLD VENIPUNCTURE: CPT | Performed by: NURSE PRACTITIONER

## 2018-12-10 PROCEDURE — 90471 IMMUNIZATION ADMIN: CPT | Performed by: NURSE PRACTITIONER

## 2018-12-10 PROCEDURE — 90686 IIV4 VACC NO PRSV 0.5 ML IM: CPT | Performed by: NURSE PRACTITIONER

## 2018-12-10 PROCEDURE — 3014F SCREEN MAMMO DOC REV: CPT | Performed by: NURSE PRACTITIONER

## 2018-12-10 PROCEDURE — 4004F PT TOBACCO SCREEN RCVD TLK: CPT | Performed by: NURSE PRACTITIONER

## 2018-12-10 PROCEDURE — G8427 DOCREV CUR MEDS BY ELIG CLIN: HCPCS | Performed by: NURSE PRACTITIONER

## 2018-12-10 PROCEDURE — G8417 CALC BMI ABV UP PARAM F/U: HCPCS | Performed by: NURSE PRACTITIONER

## 2018-12-10 PROCEDURE — 99214 OFFICE O/P EST MOD 30 MIN: CPT | Performed by: NURSE PRACTITIONER

## 2018-12-10 PROCEDURE — G8482 FLU IMMUNIZE ORDER/ADMIN: HCPCS | Performed by: NURSE PRACTITIONER

## 2018-12-10 PROCEDURE — 3017F COLORECTAL CA SCREEN DOC REV: CPT | Performed by: NURSE PRACTITIONER

## 2018-12-10 RX ORDER — ALBUTEROL SULFATE 90 UG/1
2 AEROSOL, METERED RESPIRATORY (INHALATION) EVERY 6 HOURS PRN
Qty: 1 INHALER | Refills: 1 | Status: SHIPPED | OUTPATIENT
Start: 2018-12-10 | End: 2019-02-07 | Stop reason: SDUPTHER

## 2018-12-10 ASSESSMENT — ENCOUNTER SYMPTOMS
CHEST TIGHTNESS: 0
VOMITING: 0
DIARRHEA: 0
CONSTIPATION: 0
NAUSEA: 0
COUGH: 0
WHEEZING: 0
ABDOMINAL PAIN: 0
SHORTNESS OF BREATH: 0

## 2018-12-10 NOTE — PROGRESS NOTES
SUBJECTIVE:    Baudilio Cheung  1965  48 y.o.  female      Chief Complaint   Patient presents with    Other     Pt states she needs to have bloodwork completed     HPI       Allergies   Allergen Reactions    Meloxicam Shortness Of Breath    Statins Swelling     Throat swelling, vomiting    Keflex [Cephalexin]     Penicillins     Sulfa Antibiotics     Tramadol Itching    Codeine Nausea And Vomiting    Naproxen Nausea And Vomiting     Dizzy lightheaded       Current Outpatient Prescriptions   Medication Sig Dispense Refill    albuterol sulfate HFA (PROVENTIL HFA) 108 (90 Base) MCG/ACT inhaler Inhale 2 puffs into the lungs every 6 hours as needed for Wheezing 1 Inhaler 1    lisinopril (PRINIVIL;ZESTRIL) 10 MG tablet Take 1 tablet by mouth daily 90 tablet 3    ezetimibe (ZETIA) 10 MG tablet Take 1 tablet by mouth daily 30 tablet 3    levothyroxine (SYNTHROID) 100 MCG tablet Take 1 tablet by mouth Daily 90 tablet 1     No current facility-administered medications for this visit. Past Medical History:   Diagnosis Date    H/O echocardiogram 08/28/2018    EF 55-60%. No significant valvular disease.  History of cardiac monitoring 07/31/2018    Conclusion: 30 days event monitor suggesting sinus rhythm with symptomatic sinus tachycardia.   So the symptoms are reported during normal rate and rhythm    Hyperlipidemia LDL goal <130 7/31/2018    Hypertension     Hypothyroidism     Tachycardia      Past Surgical History:   Procedure Laterality Date    CARPAL TUNNEL RELEASE Bilateral 2003    CHOLECYSTECTOMY  2013    ECTOPIC PREGNANCY SURGERY  1991    TUBAL LIGATION  1991     Social History     Social History    Marital status: Single     Spouse name: N/A    Number of children: N/A    Years of education: N/A     Social History Main Topics    Smoking status: Current Every Day Smoker     Packs/day: 0.50     Years: 20.00     Types: Cigarettes    Smokeless tobacco: Never Used      Comment: smokes

## 2018-12-10 NOTE — ASSESSMENT & PLAN NOTE
Saw ENT and was told to follow up if she has further issues. She has noticed some issues swallowing in the last month. She was told by ENT she had a mass in her throat. She did have subcentimeter thyroid nodule. She last saw him in March or April 2018. She is wanting her thyroid checked today.

## 2018-12-18 ENCOUNTER — HOSPITAL ENCOUNTER (OUTPATIENT)
Dept: CARDIAC REHAB | Age: 53
Discharge: HOME | End: 2018-12-18
Payer: COMMERCIAL

## 2018-12-18 ENCOUNTER — HOSPITAL ENCOUNTER (OUTPATIENT)
Dept: ULTRASOUND IMAGING | Age: 53
Discharge: HOME | End: 2018-12-18
Payer: COMMERCIAL

## 2018-12-18 DIAGNOSIS — E04.1 THYROID NODULE: ICD-10-CM

## 2018-12-18 DIAGNOSIS — R06.2 WHEEZING: ICD-10-CM

## 2018-12-18 LAB
DLCO %PRED: 26.95 %
DLCO PRED: NORMAL ML/MIN/MMHG
DLCO/VA %PRED: NORMAL %
DLCO/VA PRED: NORMAL ML/MIN/MMHG
DLCO/VA: NORMAL ML/MIN/MMHG
DLCO: NORMAL ML/MIN/MMHG
EXPIRATORY TIME-POST: NORMAL SEC
EXPIRATORY TIME: NORMAL SEC
FEF 25-75% %CHNG: NORMAL
FEF 25-75% %PRED-POST: NORMAL %
FEF 25-75% %PRED-PRE: NORMAL L/SEC
FEF 25-75% PRED: NORMAL L/SEC
FEF 25-75%-POST: NORMAL L/SEC
FEF 25-75%-PRE: NORMAL L/SEC
FEV1 %PRED-POST: 73 %
FEV1 %PRED-PRE: 2.86 %
FEV1 PRED: NORMAL L
FEV1-POST: NORMAL L
FEV1-PRE: NORMAL L
FEV1/FVC %PRED-POST: NORMAL %
FEV1/FVC %PRED-PRE: NORMAL %
FEV1/FVC PRED: NORMAL %
FEV1/FVC-POST: 89 %
FEV1/FVC-PRE: 80 %
FVC %PRED-POST: NORMAL L
FVC %PRED-PRE: NORMAL %
FVC PRED: NORMAL L
FVC-POST: NORMAL L
FVC-PRE: NORMAL L
GAW %PRED: NORMAL %
GAW PRED: NORMAL L/S/CMH2O
GAW: NORMAL L/S/CMH2O
IC %PRED: NORMAL %
IC PRED: NORMAL L
IC: NORMAL L
MVV %PRED-PRE: NORMAL %
MVV PRED: NORMAL L/MIN
MVV-PRE: NORMAL L/MIN
PEF %PRED-POST: NORMAL %
PEF %PRED-PRE: NORMAL L/SEC
PEF PRED: NORMAL L/SEC
PEF%CHNG: NORMAL
PEF-POST: NORMAL L/SEC
PEF-PRE: NORMAL L/SEC
RAW %PRED: NORMAL %
RAW PRED: NORMAL CMH2O/L/S
RAW: NORMAL CMH2O/L/S
RV %PRED: NORMAL %
RV PRED: NORMAL L
RV: NORMAL L
SVC %PRED: NORMAL %
SVC PRED: NORMAL L
SVC: NORMAL L
TLC %PRED: 5.35 %
TLC PRED: NORMAL L
TLC: NORMAL L
VA %PRED: NORMAL %
VA PRED: NORMAL L
VA: NORMAL L
VTG %PRED: NORMAL %
VTG PRED: NORMAL L
VTG: NORMAL L

## 2018-12-18 PROCEDURE — 94640 AIRWAY INHALATION TREATMENT: CPT

## 2018-12-18 PROCEDURE — 94729 DIFFUSING CAPACITY: CPT

## 2018-12-18 PROCEDURE — 94727 GAS DIL/WSHOT DETER LNG VOL: CPT

## 2018-12-18 PROCEDURE — 6360000002 HC RX W HCPCS

## 2018-12-18 PROCEDURE — 94060 EVALUATION OF WHEEZING: CPT

## 2018-12-18 PROCEDURE — 76536 US EXAM OF HEAD AND NECK: CPT

## 2018-12-18 ASSESSMENT — PULMONARY FUNCTION TESTS
FEV1_PERCENT_PREDICTED_POST: 73
FEV1/FVC_PRE: 80
FEV1_PERCENT_PREDICTED_PRE: 2.86
FEV1/FVC_POST: 89

## 2019-01-14 ENCOUNTER — OFFICE VISIT (OUTPATIENT)
Dept: FAMILY MEDICINE CLINIC | Age: 54
End: 2019-01-14
Payer: COMMERCIAL

## 2019-01-14 VITALS
SYSTOLIC BLOOD PRESSURE: 122 MMHG | WEIGHT: 217.6 LBS | OXYGEN SATURATION: 98 % | BODY MASS INDEX: 35.12 KG/M2 | DIASTOLIC BLOOD PRESSURE: 84 MMHG | RESPIRATION RATE: 20 BRPM | HEART RATE: 87 BPM

## 2019-01-14 DIAGNOSIS — I10 ESSENTIAL HYPERTENSION: ICD-10-CM

## 2019-01-14 DIAGNOSIS — E04.1 THYROID NODULE: ICD-10-CM

## 2019-01-14 DIAGNOSIS — R68.89 FLU-LIKE SYMPTOMS: Primary | ICD-10-CM

## 2019-01-14 DIAGNOSIS — J01.90 ACUTE NON-RECURRENT SINUSITIS, UNSPECIFIED LOCATION: ICD-10-CM

## 2019-01-14 PROCEDURE — G8427 DOCREV CUR MEDS BY ELIG CLIN: HCPCS | Performed by: NURSE PRACTITIONER

## 2019-01-14 PROCEDURE — 3014F SCREEN MAMMO DOC REV: CPT | Performed by: NURSE PRACTITIONER

## 2019-01-14 PROCEDURE — 3017F COLORECTAL CA SCREEN DOC REV: CPT | Performed by: NURSE PRACTITIONER

## 2019-01-14 PROCEDURE — G8482 FLU IMMUNIZE ORDER/ADMIN: HCPCS | Performed by: NURSE PRACTITIONER

## 2019-01-14 PROCEDURE — 4004F PT TOBACCO SCREEN RCVD TLK: CPT | Performed by: NURSE PRACTITIONER

## 2019-01-14 PROCEDURE — 99214 OFFICE O/P EST MOD 30 MIN: CPT | Performed by: NURSE PRACTITIONER

## 2019-01-14 PROCEDURE — G8417 CALC BMI ABV UP PARAM F/U: HCPCS | Performed by: NURSE PRACTITIONER

## 2019-01-14 RX ORDER — FLUTICASONE PROPIONATE 50 MCG
1 SPRAY, SUSPENSION (ML) NASAL DAILY
Qty: 1 BOTTLE | Refills: 0 | Status: SHIPPED | OUTPATIENT
Start: 2019-01-14 | End: 2019-05-22 | Stop reason: ALTCHOICE

## 2019-01-14 RX ORDER — BENZONATATE 100 MG/1
100-200 CAPSULE ORAL 3 TIMES DAILY PRN
Qty: 60 CAPSULE | Refills: 0 | Status: SHIPPED | OUTPATIENT
Start: 2019-01-14 | End: 2019-01-21

## 2019-01-14 RX ORDER — AZITHROMYCIN 250 MG/1
250 TABLET, FILM COATED ORAL SEE ADMIN INSTRUCTIONS
Qty: 6 TABLET | Refills: 0 | Status: SHIPPED | OUTPATIENT
Start: 2019-01-14 | End: 2019-01-19

## 2019-01-14 ASSESSMENT — ENCOUNTER SYMPTOMS
CONSTIPATION: 0
VOMITING: 1
COUGH: 1
RHINORRHEA: 1
SORE THROAT: 1
NAUSEA: 1
WHEEZING: 0
SHORTNESS OF BREATH: 0
DIARRHEA: 1
CHEST TIGHTNESS: 0
ABDOMINAL PAIN: 0

## 2019-01-15 ENCOUNTER — TELEPHONE (OUTPATIENT)
Dept: FAMILY MEDICINE CLINIC | Age: 54
End: 2019-01-15

## 2019-02-07 ENCOUNTER — OFFICE VISIT (OUTPATIENT)
Dept: FAMILY MEDICINE CLINIC | Age: 54
End: 2019-02-07
Payer: COMMERCIAL

## 2019-02-07 VITALS
BODY MASS INDEX: 34.99 KG/M2 | OXYGEN SATURATION: 97 % | DIASTOLIC BLOOD PRESSURE: 84 MMHG | SYSTOLIC BLOOD PRESSURE: 114 MMHG | TEMPERATURE: 98.4 F | RESPIRATION RATE: 16 BRPM | HEART RATE: 93 BPM | WEIGHT: 216.8 LBS

## 2019-02-07 DIAGNOSIS — E03.9 ACQUIRED HYPOTHYROIDISM: ICD-10-CM

## 2019-02-07 DIAGNOSIS — R06.2 WHEEZING: ICD-10-CM

## 2019-02-07 DIAGNOSIS — R21 RASH AND NONSPECIFIC SKIN ERUPTION: ICD-10-CM

## 2019-02-07 DIAGNOSIS — E04.1 THYROID NODULE: ICD-10-CM

## 2019-02-07 DIAGNOSIS — Z72.0 TOBACCO ABUSE: ICD-10-CM

## 2019-02-07 DIAGNOSIS — Z00.00 ROUTINE HEALTH MAINTENANCE: ICD-10-CM

## 2019-02-07 DIAGNOSIS — J02.9 SORE THROAT: ICD-10-CM

## 2019-02-07 DIAGNOSIS — Z12.11 SCREENING FOR COLON CANCER: Primary | ICD-10-CM

## 2019-02-07 DIAGNOSIS — J01.91 ACUTE RECURRENT SINUSITIS, UNSPECIFIED LOCATION: ICD-10-CM

## 2019-02-07 DIAGNOSIS — I10 ESSENTIAL HYPERTENSION: ICD-10-CM

## 2019-02-07 LAB
A/G RATIO: 1.3 (ref 1.1–2.2)
ALBUMIN SERPL-MCNC: 4.3 G/DL (ref 3.4–5)
ALP BLD-CCNC: 81 U/L (ref 40–129)
ALT SERPL-CCNC: 24 U/L (ref 10–40)
ANION GAP SERPL CALCULATED.3IONS-SCNC: 12 MMOL/L (ref 3–16)
AST SERPL-CCNC: 22 U/L (ref 15–37)
BILIRUB SERPL-MCNC: 0.3 MG/DL (ref 0–1)
BUN BLDV-MCNC: 10 MG/DL (ref 7–20)
CALCIUM SERPL-MCNC: 9.3 MG/DL (ref 8.3–10.6)
CHLORIDE BLD-SCNC: 100 MMOL/L (ref 99–110)
CHOLESTEROL, TOTAL: 163 MG/DL (ref 0–199)
CO2: 25 MMOL/L (ref 21–32)
CREAT SERPL-MCNC: 0.7 MG/DL (ref 0.6–1.1)
GFR AFRICAN AMERICAN: >60
GFR NON-AFRICAN AMERICAN: >60
GLOBULIN: 3.4 G/DL
GLUCOSE BLD-MCNC: 89 MG/DL (ref 70–99)
HCT VFR BLD CALC: 44.6 % (ref 36–48)
HDLC SERPL-MCNC: 35 MG/DL (ref 40–60)
HEMOGLOBIN: 15.2 G/DL (ref 12–16)
LDL CHOLESTEROL CALCULATED: 89 MG/DL
MCH RBC QN AUTO: 30.4 PG (ref 26–34)
MCHC RBC AUTO-ENTMCNC: 34.2 G/DL (ref 31–36)
MCV RBC AUTO: 88.9 FL (ref 80–100)
PDW BLD-RTO: 13.5 % (ref 12.4–15.4)
PLATELET # BLD: 248 K/UL (ref 135–450)
PMV BLD AUTO: 10 FL (ref 5–10.5)
POTASSIUM SERPL-SCNC: 4.3 MMOL/L (ref 3.5–5.1)
RBC # BLD: 5.01 M/UL (ref 4–5.2)
SODIUM BLD-SCNC: 137 MMOL/L (ref 136–145)
STREPTOCOCCUS A RNA: NORMAL
TOTAL PROTEIN: 7.7 G/DL (ref 6.4–8.2)
TRIGL SERPL-MCNC: 194 MG/DL (ref 0–150)
TSH REFLEX: 0.97 UIU/ML (ref 0.27–4.2)
VLDLC SERPL CALC-MCNC: 39 MG/DL
WBC # BLD: 9.7 K/UL (ref 4–11)

## 2019-02-07 PROCEDURE — 4004F PT TOBACCO SCREEN RCVD TLK: CPT | Performed by: NURSE PRACTITIONER

## 2019-02-07 PROCEDURE — 3017F COLORECTAL CA SCREEN DOC REV: CPT | Performed by: NURSE PRACTITIONER

## 2019-02-07 PROCEDURE — G8417 CALC BMI ABV UP PARAM F/U: HCPCS | Performed by: NURSE PRACTITIONER

## 2019-02-07 PROCEDURE — 87651 STREP A DNA AMP PROBE: CPT | Performed by: NURSE PRACTITIONER

## 2019-02-07 PROCEDURE — G8427 DOCREV CUR MEDS BY ELIG CLIN: HCPCS | Performed by: NURSE PRACTITIONER

## 2019-02-07 PROCEDURE — 99214 OFFICE O/P EST MOD 30 MIN: CPT | Performed by: NURSE PRACTITIONER

## 2019-02-07 PROCEDURE — 3014F SCREEN MAMMO DOC REV: CPT | Performed by: NURSE PRACTITIONER

## 2019-02-07 PROCEDURE — G8482 FLU IMMUNIZE ORDER/ADMIN: HCPCS | Performed by: NURSE PRACTITIONER

## 2019-02-07 RX ORDER — LEVOTHYROXINE SODIUM 0.1 MG/1
100 TABLET ORAL DAILY
Qty: 90 TABLET | Refills: 1 | Status: SHIPPED | OUTPATIENT
Start: 2019-02-07 | End: 2019-02-28 | Stop reason: SDUPTHER

## 2019-02-07 RX ORDER — ALBUTEROL SULFATE 90 UG/1
2 AEROSOL, METERED RESPIRATORY (INHALATION) EVERY 6 HOURS PRN
Qty: 1 INHALER | Refills: 1 | Status: SHIPPED | OUTPATIENT
Start: 2019-02-07 | End: 2020-03-04

## 2019-02-07 RX ORDER — EZETIMIBE 10 MG/1
10 TABLET ORAL DAILY
Qty: 30 TABLET | Refills: 3 | Status: SHIPPED | OUTPATIENT
Start: 2019-02-07 | End: 2019-02-28 | Stop reason: SDUPTHER

## 2019-02-07 RX ORDER — DOXYCYCLINE HYCLATE 100 MG
100 TABLET ORAL 2 TIMES DAILY
Qty: 14 TABLET | Refills: 0 | Status: SHIPPED | OUTPATIENT
Start: 2019-02-07 | End: 2019-02-14

## 2019-02-07 RX ORDER — BENZONATATE 100 MG/1
100-200 CAPSULE ORAL 3 TIMES DAILY PRN
Qty: 60 CAPSULE | Refills: 0 | Status: SHIPPED | OUTPATIENT
Start: 2019-02-07 | End: 2019-02-14

## 2019-02-07 ASSESSMENT — ENCOUNTER SYMPTOMS
RHINORRHEA: 0
CONSTIPATION: 0
DIARRHEA: 0
COUGH: 1
SORE THROAT: 1
SHORTNESS OF BREATH: 0
VOMITING: 0
ABDOMINAL PAIN: 0
WHEEZING: 1
SINUS PRESSURE: 0
NAUSEA: 0
SINUS PAIN: 0
CHEST TIGHTNESS: 0

## 2019-02-07 ASSESSMENT — PATIENT HEALTH QUESTIONNAIRE - PHQ9
2. FEELING DOWN, DEPRESSED OR HOPELESS: 0
1. LITTLE INTEREST OR PLEASURE IN DOING THINGS: 0
SUM OF ALL RESPONSES TO PHQ QUESTIONS 1-9: 0
SUM OF ALL RESPONSES TO PHQ QUESTIONS 1-9: 0
SUM OF ALL RESPONSES TO PHQ9 QUESTIONS 1 & 2: 0

## 2019-02-08 ENCOUNTER — TELEPHONE (OUTPATIENT)
Dept: FAMILY MEDICINE CLINIC | Age: 54
End: 2019-02-08

## 2019-02-14 ENCOUNTER — TELEPHONE (OUTPATIENT)
Dept: FAMILY MEDICINE CLINIC | Age: 54
End: 2019-02-14

## 2019-02-28 ENCOUNTER — OFFICE VISIT (OUTPATIENT)
Dept: FAMILY MEDICINE CLINIC | Age: 54
End: 2019-02-28
Payer: COMMERCIAL

## 2019-02-28 VITALS
WEIGHT: 217 LBS | HEART RATE: 85 BPM | DIASTOLIC BLOOD PRESSURE: 88 MMHG | HEIGHT: 64 IN | SYSTOLIC BLOOD PRESSURE: 132 MMHG | RESPIRATION RATE: 18 BRPM | BODY MASS INDEX: 37.05 KG/M2

## 2019-02-28 DIAGNOSIS — E04.1 THYROID NODULE: ICD-10-CM

## 2019-02-28 DIAGNOSIS — Z87.891 FORMER SMOKER: ICD-10-CM

## 2019-02-28 DIAGNOSIS — R13.10 DYSPHAGIA, UNSPECIFIED TYPE: Primary | ICD-10-CM

## 2019-02-28 DIAGNOSIS — E03.9 ACQUIRED HYPOTHYROIDISM: ICD-10-CM

## 2019-02-28 DIAGNOSIS — E78.5 HYPERLIPIDEMIA LDL GOAL <130: ICD-10-CM

## 2019-02-28 DIAGNOSIS — I10 ESSENTIAL HYPERTENSION: ICD-10-CM

## 2019-02-28 PROCEDURE — 3014F SCREEN MAMMO DOC REV: CPT | Performed by: NURSE PRACTITIONER

## 2019-02-28 PROCEDURE — G8482 FLU IMMUNIZE ORDER/ADMIN: HCPCS | Performed by: NURSE PRACTITIONER

## 2019-02-28 PROCEDURE — 1036F TOBACCO NON-USER: CPT | Performed by: NURSE PRACTITIONER

## 2019-02-28 PROCEDURE — 3017F COLORECTAL CA SCREEN DOC REV: CPT | Performed by: NURSE PRACTITIONER

## 2019-02-28 PROCEDURE — 99214 OFFICE O/P EST MOD 30 MIN: CPT | Performed by: NURSE PRACTITIONER

## 2019-02-28 PROCEDURE — G8427 DOCREV CUR MEDS BY ELIG CLIN: HCPCS | Performed by: NURSE PRACTITIONER

## 2019-02-28 PROCEDURE — G8417 CALC BMI ABV UP PARAM F/U: HCPCS | Performed by: NURSE PRACTITIONER

## 2019-02-28 RX ORDER — EZETIMIBE 10 MG/1
10 TABLET ORAL DAILY
Qty: 90 TABLET | Refills: 1 | Status: SHIPPED | OUTPATIENT
Start: 2019-02-28 | End: 2020-01-02 | Stop reason: SDUPTHER

## 2019-02-28 RX ORDER — LEVOTHYROXINE SODIUM 0.1 MG/1
100 TABLET ORAL DAILY
Qty: 90 TABLET | Refills: 1 | Status: SHIPPED | OUTPATIENT
Start: 2019-02-28 | End: 2020-01-02 | Stop reason: SDUPTHER

## 2019-02-28 RX ORDER — EZETIMIBE 10 MG/1
10 TABLET ORAL DAILY
Qty: 30 TABLET | Refills: 3 | Status: SHIPPED | OUTPATIENT
Start: 2019-02-28 | End: 2019-02-28 | Stop reason: SDUPTHER

## 2019-02-28 ASSESSMENT — PATIENT HEALTH QUESTIONNAIRE - PHQ9
SUM OF ALL RESPONSES TO PHQ QUESTIONS 1-9: 0
2. FEELING DOWN, DEPRESSED OR HOPELESS: 0
SUM OF ALL RESPONSES TO PHQ QUESTIONS 1-9: 0
SUM OF ALL RESPONSES TO PHQ9 QUESTIONS 1 & 2: 0
1. LITTLE INTEREST OR PLEASURE IN DOING THINGS: 0

## 2019-02-28 ASSESSMENT — ENCOUNTER SYMPTOMS
NAUSEA: 0
VOMITING: 0
CHEST TIGHTNESS: 0
CONSTIPATION: 0
WHEEZING: 0
ABDOMINAL PAIN: 0
DIARRHEA: 0
COUGH: 0
SHORTNESS OF BREATH: 0

## 2019-04-15 ENCOUNTER — TELEPHONE (OUTPATIENT)
Dept: FAMILY MEDICINE CLINIC | Age: 54
End: 2019-04-15

## 2019-05-22 ENCOUNTER — APPOINTMENT (OUTPATIENT)
Dept: GENERAL RADIOLOGY | Age: 54
End: 2019-05-22
Payer: COMMERCIAL

## 2019-05-22 ENCOUNTER — HOSPITAL ENCOUNTER (EMERGENCY)
Age: 54
Discharge: HOME | End: 2019-05-22
Attending: EMERGENCY MEDICINE
Payer: COMMERCIAL

## 2019-05-22 ENCOUNTER — APPOINTMENT (OUTPATIENT)
Dept: CT IMAGING | Age: 54
End: 2019-05-22
Payer: COMMERCIAL

## 2019-05-22 VITALS
RESPIRATION RATE: 16 BRPM | BODY MASS INDEX: 30.53 KG/M2 | HEIGHT: 66 IN | WEIGHT: 190 LBS | HEART RATE: 74 BPM | DIASTOLIC BLOOD PRESSURE: 91 MMHG | TEMPERATURE: 96.8 F | OXYGEN SATURATION: 98 % | SYSTOLIC BLOOD PRESSURE: 161 MMHG

## 2019-05-22 DIAGNOSIS — R11.0 NAUSEA: ICD-10-CM

## 2019-05-22 DIAGNOSIS — R42 VERTIGO: Primary | ICD-10-CM

## 2019-05-22 LAB
ALBUMIN SERPL-MCNC: 4.3 GM/DL (ref 3.4–5)
ALP BLD-CCNC: 77 IU/L (ref 40–129)
ALT SERPL-CCNC: 24 U/L (ref 10–40)
ANION GAP SERPL CALCULATED.3IONS-SCNC: 11 MMOL/L (ref 4–16)
APTT: 28.1 SECONDS (ref 24–40)
AST SERPL-CCNC: 19 IU/L (ref 15–37)
BASOPHILS ABSOLUTE: 0.1 K/CU MM
BASOPHILS RELATIVE PERCENT: 0.6 % (ref 0–1)
BILIRUB SERPL-MCNC: 0.3 MG/DL (ref 0–1)
BUN BLDV-MCNC: 9 MG/DL (ref 6–23)
CALCIUM SERPL-MCNC: 9.4 MG/DL (ref 8.3–10.6)
CHLORIDE BLD-SCNC: 102 MMOL/L (ref 99–110)
CO2: 29 MMOL/L (ref 21–32)
CREAT SERPL-MCNC: 0.8 MG/DL (ref 0.6–1.1)
D DIMER: <200 NG/ML(DDU)
DIFFERENTIAL TYPE: ABNORMAL
EOSINOPHILS ABSOLUTE: 0.3 K/CU MM
EOSINOPHILS RELATIVE PERCENT: 2.8 % (ref 0–3)
GFR AFRICAN AMERICAN: >60 ML/MIN/1.73M2
GFR NON-AFRICAN AMERICAN: >60 ML/MIN/1.73M2
GLUCOSE BLD-MCNC: 139 MG/DL (ref 70–99)
HCT VFR BLD CALC: 45 % (ref 37–47)
HEMOGLOBIN: 15 GM/DL (ref 12.5–16)
IMMATURE NEUTROPHIL %: 0.2 % (ref 0–0.43)
INR BLD: 1.01 INDEX
LYMPHOCYTES ABSOLUTE: 2.8 K/CU MM
LYMPHOCYTES RELATIVE PERCENT: 30.7 % (ref 24–44)
MCH RBC QN AUTO: 30.1 PG (ref 27–31)
MCHC RBC AUTO-ENTMCNC: 33.3 % (ref 32–36)
MCV RBC AUTO: 90.4 FL (ref 78–100)
MONOCYTES ABSOLUTE: 0.5 K/CU MM
MONOCYTES RELATIVE PERCENT: 5.4 % (ref 0–4)
PDW BLD-RTO: 12.7 % (ref 11.7–14.9)
PLATELET # BLD: 240 K/CU MM (ref 140–440)
PMV BLD AUTO: 10.6 FL (ref 7.5–11.1)
POTASSIUM SERPL-SCNC: 3.7 MMOL/L (ref 3.5–5.1)
PROTHROMBIN TIME: 11.5 SECONDS (ref 9.12–12.5)
RBC # BLD: 4.98 M/CU MM (ref 4.2–5.4)
SEGMENTED NEUTROPHILS ABSOLUTE COUNT: 5.4 K/CU MM
SEGMENTED NEUTROPHILS RELATIVE PERCENT: 60.3 % (ref 36–66)
SODIUM BLD-SCNC: 142 MMOL/L (ref 135–145)
TOTAL IMMATURE NEUTOROPHIL: 0.02 K/CU MM
TOTAL PROTEIN: 8.2 GM/DL (ref 6.4–8.2)
TROPONIN T: <0.01 NG/ML
WBC # BLD: 9 K/CU MM (ref 4–10.5)

## 2019-05-22 PROCEDURE — 85610 PROTHROMBIN TIME: CPT

## 2019-05-22 PROCEDURE — 85379 FIBRIN DEGRADATION QUANT: CPT

## 2019-05-22 PROCEDURE — 99284 EMERGENCY DEPT VISIT MOD MDM: CPT

## 2019-05-22 PROCEDURE — 6360000002 HC RX W HCPCS: Performed by: EMERGENCY MEDICINE

## 2019-05-22 PROCEDURE — 71045 X-RAY EXAM CHEST 1 VIEW: CPT

## 2019-05-22 PROCEDURE — 96375 TX/PRO/DX INJ NEW DRUG ADDON: CPT

## 2019-05-22 PROCEDURE — 6370000000 HC RX 637 (ALT 250 FOR IP): Performed by: EMERGENCY MEDICINE

## 2019-05-22 PROCEDURE — 2580000003 HC RX 258: Performed by: EMERGENCY MEDICINE

## 2019-05-22 PROCEDURE — 84484 ASSAY OF TROPONIN QUANT: CPT

## 2019-05-22 PROCEDURE — 80053 COMPREHEN METABOLIC PANEL: CPT

## 2019-05-22 PROCEDURE — 93005 ELECTROCARDIOGRAM TRACING: CPT | Performed by: EMERGENCY MEDICINE

## 2019-05-22 PROCEDURE — 70450 CT HEAD/BRAIN W/O DYE: CPT

## 2019-05-22 PROCEDURE — 85730 THROMBOPLASTIN TIME PARTIAL: CPT

## 2019-05-22 PROCEDURE — 96374 THER/PROPH/DIAG INJ IV PUSH: CPT

## 2019-05-22 PROCEDURE — 85025 COMPLETE CBC W/AUTO DIFF WBC: CPT

## 2019-05-22 PROCEDURE — 93010 ELECTROCARDIOGRAM REPORT: CPT | Performed by: INTERNAL MEDICINE

## 2019-05-22 RX ORDER — SODIUM CHLORIDE 9 MG/ML
INJECTION, SOLUTION INTRAVENOUS CONTINUOUS
Status: DISCONTINUED | OUTPATIENT
Start: 2019-05-22 | End: 2019-05-22 | Stop reason: HOSPADM

## 2019-05-22 RX ORDER — ONDANSETRON 4 MG/1
4 TABLET, ORALLY DISINTEGRATING ORAL EVERY 6 HOURS PRN
Qty: 10 TABLET | Refills: 0 | Status: SHIPPED | OUTPATIENT
Start: 2019-05-22 | End: 2020-02-12 | Stop reason: CLARIF

## 2019-05-22 RX ORDER — MECLIZINE HYDROCHLORIDE 25 MG/1
25 TABLET ORAL ONCE
Status: COMPLETED | OUTPATIENT
Start: 2019-05-22 | End: 2019-05-22

## 2019-05-22 RX ORDER — ONDANSETRON 2 MG/ML
4 INJECTION INTRAMUSCULAR; INTRAVENOUS EVERY 30 MIN PRN
Status: DISCONTINUED | OUTPATIENT
Start: 2019-05-22 | End: 2019-05-22 | Stop reason: HOSPADM

## 2019-05-22 RX ORDER — METHYLPREDNISOLONE SODIUM SUCCINATE 125 MG/2ML
125 INJECTION, POWDER, LYOPHILIZED, FOR SOLUTION INTRAMUSCULAR; INTRAVENOUS ONCE
Status: COMPLETED | OUTPATIENT
Start: 2019-05-22 | End: 2019-05-22

## 2019-05-22 RX ORDER — MECLIZINE HYDROCHLORIDE 25 MG/1
25 TABLET ORAL 3 TIMES DAILY PRN
Qty: 30 TABLET | Refills: 0 | Status: SHIPPED | OUTPATIENT
Start: 2019-05-22 | End: 2019-06-01

## 2019-05-22 RX ORDER — 0.9 % SODIUM CHLORIDE 0.9 %
500 INTRAVENOUS SOLUTION INTRAVENOUS ONCE
Status: COMPLETED | OUTPATIENT
Start: 2019-05-22 | End: 2019-05-22

## 2019-05-22 RX ORDER — METHYLPREDNISOLONE 4 MG/1
TABLET ORAL
Qty: 1 KIT | Refills: 0 | Status: SHIPPED | OUTPATIENT
Start: 2019-05-22 | End: 2020-02-12 | Stop reason: ALTCHOICE

## 2019-05-22 RX ORDER — SODIUM CHLORIDE 0.9 % (FLUSH) 0.9 %
10 SYRINGE (ML) INJECTION PRN
Status: DISCONTINUED | OUTPATIENT
Start: 2019-05-22 | End: 2019-05-22 | Stop reason: HOSPADM

## 2019-05-22 RX ORDER — LISINOPRIL 10 MG/1
10 TABLET ORAL DAILY
COMMUNITY
End: 2019-10-10 | Stop reason: SDUPTHER

## 2019-05-22 RX ADMIN — SODIUM CHLORIDE 500 ML: 9 INJECTION, SOLUTION INTRAVENOUS at 16:19

## 2019-05-22 RX ADMIN — METHYLPREDNISOLONE SODIUM SUCCINATE 125 MG: 125 INJECTION, POWDER, FOR SOLUTION INTRAMUSCULAR; INTRAVENOUS at 16:22

## 2019-05-22 RX ADMIN — SODIUM CHLORIDE, PRESERVATIVE FREE 10 ML: 5 INJECTION INTRAVENOUS at 15:52

## 2019-05-22 RX ADMIN — MECLIZINE HYDROCHLORIDE 25 MG: 25 TABLET ORAL at 16:23

## 2019-05-22 RX ADMIN — ONDANSETRON 4 MG: 2 INJECTION INTRAMUSCULAR; INTRAVENOUS at 16:20

## 2019-05-22 RX ADMIN — SODIUM CHLORIDE: 9 INJECTION, SOLUTION INTRAVENOUS at 16:18

## 2019-05-22 ASSESSMENT — PAIN DESCRIPTION - PAIN TYPE: TYPE: ACUTE PAIN

## 2019-05-22 ASSESSMENT — PAIN DESCRIPTION - DESCRIPTORS: DESCRIPTORS_2: DULL;SHARP

## 2019-05-22 ASSESSMENT — PAIN DESCRIPTION - LOCATION
LOCATION_2: ABDOMEN
LOCATION: THROAT

## 2019-05-22 ASSESSMENT — PAIN DESCRIPTION - INTENSITY: RATING_2: 5

## 2019-05-22 ASSESSMENT — PAIN SCALES - GENERAL: PAINLEVEL_OUTOF10: 0

## 2019-05-22 ASSESSMENT — PAIN DESCRIPTION - DURATION: DURATION_2: CONTINUOUS

## 2019-05-22 NOTE — ED PROVIDER NOTES
Emergency Department Encounter  Location: [address]    Patient: Kaur Doll  MRN: [id number]  : 1965  Date of evaluation: 2019  ED Provider: Viry Purvis DO, FACEP    Chief Complaint:    Dizziness (States about an hour ago became  very dizzy and nauseated. States something doesn't feel right. States no recent cough or nasal congestion. Denies fever or chills. States her throat feels swollen. Denies any new or different foods. ); Nausea; and Oral Swelling    Council:  Kaur Doll is a 48 y.o. female that presents to the emergency department with sudden onset of dizziness and nausea that started when she tried to get up from a nap. The patient's complaining of posterior left occipital headache. She states that the headache is minimal and she is not complaining of headache at this time. But she states she was very dizzy. She felt she was going to collapse and was having difficulty focusing her vision. She states when she moves her head to the left seems to make things much worse. The patient states when she sat up from a lying position she was feeling very dizzy as well. She denies any chest pain or shortness of breath but states she has pain in her left side which she describes pain under her left rib cage radiating around laterally. She denies any shortness of breath and she states the pain does not get worse when she takes a deep breath. She denies fever or chills. She states she nearly vomited and felt the urge to have a bowel movement but did not. She states she felt very strange and his never had a problem like this previously. The patient also felt like she was having some swelling in the back of her throat. ROS:  At least 10 systems reviewed and otherwise acutely negative except as in the [address] Past Medical History:   Diagnosis Date    H/O echocardiogram 2018    EF 55-60%. No significant valvular disease.      History of cardiac monitoring member of club or organization: Not on file     Attends meetings of clubs or organizations: Not on file     Relationship status: Not on file    Intimate partner violence:     Fear of current or ex partner: Not on file     Emotionally abused: Not on file     Physically abused: Not on file     Forced sexual activity: Not on file   Other Topics Concern    Not on file   Social History Narrative    Not on file     Current Facility-Administered Medications   Medication Dose Route Frequency Provider Last Rate Last Dose    sodium chloride flush 0.9 % injection 10 mL  10 mL Intravenous PRN Viry Rink, DO   10 mL at 05/22/19 1552    ondansetron (ZOFRAN) injection 4 mg  4 mg Intravenous Q30 Min PRN Viry Rink, DO   4 mg at 05/22/19 1620    0.9 % sodium chloride infusion   Intravenous Continuous Viry Rink,  mL/hr at 05/22/19 1618       Current Outpatient Medications   Medication Sig Dispense Refill    lisinopril (PRINIVIL;ZESTRIL) 10 MG tablet Take 10 mg by mouth daily      meclizine (ANTIVERT) 25 MG tablet Take 1 tablet by mouth 3 times daily as needed for Dizziness 30 tablet 0    ondansetron (ZOFRAN ODT) 4 MG disintegrating tablet Take 1 tablet by mouth every 6 hours as needed for Nausea or Vomiting 10 tablet 0    methylPREDNISolone (MEDROL, WILLIAM,) 4 MG tablet Take by mouth.  1 kit 0    levothyroxine (SYNTHROID) 100 MCG tablet Take 1 tablet by mouth Daily 90 tablet 1    ezetimibe (ZETIA) 10 MG tablet Take 1 tablet by mouth daily 90 tablet 1    albuterol sulfate HFA (PROVENTIL HFA) 108 (90 Base) MCG/ACT inhaler Inhale 2 puffs into the lungs every 6 hours as needed for Wheezing 1 Inhaler 1     Allergies   Allergen Reactions    Meloxicam Shortness Of Breath    Statins Swelling     Throat swelling, vomiting    Keflex [Cephalexin]     Penicillins     Sulfa Antibiotics     Tramadol Itching    Codeine Nausea And Vomiting    Naproxen Nausea And Vomiting     Dizzy lightheaded       Nursing Notes Reviewed    Physical Exam:  ED Triage Vitals [05/22/19 1532]   Enc Vitals Group      BP (!) 161/91      Pulse 74      Resp 16      Temp 96.8 °F (36 °C)      Temp Source Oral      SpO2 98 %      Weight 190 lb (86.2 kg)      Height 5' 6\" (1.676 m)      Head Circumference       Peak Flow       Pain Score       Pain Loc       Pain Edu? Excl. in [address]? GENERAL APPEARANCE: Awake and alert. Cooperative. No acute distress. Nontoxic in appearance  HEAD: Normocephalic. Atraumatic. EYES: EOM's grossly intact. Sclera anicteric. Pupils are equally reactive to light  ENT: Tolerates saliva. No trismus. Tympanic membranes are clear bilaterally. There does appear to be a little bit of clear fluid behind the left TM  NECK: Supple. Trachea midline. CARDIO: RRR. Radial pulse 2+. LUNGS: Respirations unlabored. CTAB. ABDOMEN: Soft. Non-distended. Slight tenderness under her left anterior rib cage radiating laterally to the mid axillary line. EXTREMITIES: No acute deformities. Calves are nontender to palpation and no cords are palpable  SKIN: Warm and dry. NEUROLOGICAL: No gross facial drooping. Moves all 4 extremities spontaneously. Patient has nystagmus with fast component to the left. She gets dizzy when her eyes moved to the left. PSYCHIATRIC: Normal mood.      Labs:  Results for orders placed or performed during the hospital encounter of 05/22/19   CBC Auto Differential   Result Value Ref Range    WBC 9.0 4.0 - 10.5 K/CU MM    RBC 4.98 4.2 - 5.4 M/CU MM    Hemoglobin 15.0 12.5 - 16.0 GM/DL    Hematocrit 45.0 37 - 47 %    MCV 90.4 78 - 100 FL    MCH 30.1 27 - 31 PG    MCHC 33.3 32.0 - 36.0 %    RDW 12.7 11.7 - 14.9 %    Platelets 462 564 - 121 K/CU MM    MPV 10.6 7.5 - 11.1 FL    Differential Type AUTOMATED DIFFERENTIAL     Segs Relative 60.3 36 - 66 %    Lymphocytes % 30.7 24 - 44 %    Monocytes % 5.4 (H) 0 - 4 %    Eosinophils % 2.8 0 - 3 %    Basophils % 0.6 0 - 1 %    Segs Absolute 5.4 K/CU MM [address] [facility], [address]

## 2019-05-22 NOTE — ED NOTES
Discharge instructions and prescriptions were reviewed and the patient was given resources for follow up, however her insurance is about to .                Ryann Del Cid RN  [phone]

## 2019-05-24 LAB
EKG ATRIAL RATE: 69 BPM
EKG DIAGNOSIS: NORMAL
EKG P-R INTERVAL: 148 MS
EKG Q-T INTERVAL: 412 MS
EKG QRS DURATION: 76 MS
EKG QTC CALCULATION (BAZETT): 441 MS
EKG R AXIS: 142 DEGREES
EKG T AXIS: 141 DEGREES
EKG VENTRICULAR RATE: 69 BPM

## 2019-10-03 RX ORDER — LISINOPRIL 10 MG/1
TABLET ORAL
Qty: 90 TABLET | Refills: 3 | OUTPATIENT
Start: 2019-10-03

## 2019-10-10 RX ORDER — LISINOPRIL 10 MG/1
10 TABLET ORAL DAILY
Qty: 90 TABLET | Refills: 0 | Status: SHIPPED | OUTPATIENT
Start: 2019-10-10 | End: 2019-10-11 | Stop reason: SDUPTHER

## 2019-10-11 RX ORDER — LISINOPRIL 10 MG/1
10 TABLET ORAL DAILY
Qty: 30 TABLET | Refills: 1 | Status: SHIPPED | OUTPATIENT
Start: 2019-10-11 | End: 2020-01-02 | Stop reason: SDUPTHER

## 2019-10-14 RX ORDER — LISINOPRIL 10 MG/1
TABLET ORAL
Qty: 90 TABLET | Refills: 3 | OUTPATIENT
Start: 2019-10-14

## 2020-01-02 RX ORDER — EZETIMIBE 10 MG/1
10 TABLET ORAL DAILY
Qty: 90 TABLET | Refills: 0 | Status: SHIPPED | OUTPATIENT
Start: 2020-01-02 | End: 2020-02-07 | Stop reason: SDUPTHER

## 2020-01-02 RX ORDER — LISINOPRIL 10 MG/1
10 TABLET ORAL DAILY
Qty: 30 TABLET | Refills: 1 | Status: SHIPPED | OUTPATIENT
Start: 2020-01-02 | End: 2020-02-07 | Stop reason: SDUPTHER

## 2020-01-02 RX ORDER — LEVOTHYROXINE SODIUM 0.1 MG/1
100 TABLET ORAL DAILY
Qty: 90 TABLET | Refills: 0 | Status: SHIPPED | OUTPATIENT
Start: 2020-01-02 | End: 2020-02-07 | Stop reason: SDUPTHER

## 2020-02-07 RX ORDER — LEVOTHYROXINE SODIUM 0.1 MG/1
100 TABLET ORAL DAILY
Qty: 30 TABLET | Refills: 0 | Status: SHIPPED | OUTPATIENT
Start: 2020-02-07 | End: 2020-02-12 | Stop reason: SDUPTHER

## 2020-02-07 RX ORDER — LISINOPRIL 10 MG/1
10 TABLET ORAL DAILY
Qty: 30 TABLET | Refills: 0 | Status: SHIPPED | OUTPATIENT
Start: 2020-02-07 | End: 2020-02-12 | Stop reason: SDUPTHER

## 2020-02-07 RX ORDER — EZETIMIBE 10 MG/1
10 TABLET ORAL DAILY
Qty: 30 TABLET | Refills: 0 | Status: SHIPPED | OUTPATIENT
Start: 2020-02-07 | End: 2020-02-12 | Stop reason: SDUPTHER

## 2020-02-12 ENCOUNTER — OFFICE VISIT (OUTPATIENT)
Dept: FAMILY MEDICINE CLINIC | Age: 55
End: 2020-02-12
Payer: COMMERCIAL

## 2020-02-12 VITALS
DIASTOLIC BLOOD PRESSURE: 76 MMHG | RESPIRATION RATE: 16 BRPM | BODY MASS INDEX: 35.28 KG/M2 | WEIGHT: 218.6 LBS | SYSTOLIC BLOOD PRESSURE: 120 MMHG | OXYGEN SATURATION: 98 % | HEART RATE: 78 BPM

## 2020-02-12 DIAGNOSIS — I10 ESSENTIAL HYPERTENSION: ICD-10-CM

## 2020-02-12 DIAGNOSIS — E03.9 ACQUIRED HYPOTHYROIDISM: ICD-10-CM

## 2020-02-12 DIAGNOSIS — E78.5 HYPERLIPIDEMIA LDL GOAL <130: ICD-10-CM

## 2020-02-12 DIAGNOSIS — Z00.00 ENCOUNTER FOR ANNUAL PHYSICAL EXAM: ICD-10-CM

## 2020-02-12 PROBLEM — F17.200 CURRENT SMOKER: Status: ACTIVE | Noted: 2018-05-14

## 2020-02-12 LAB
A/G RATIO: 1.1 (ref 1.1–2.2)
ALBUMIN SERPL-MCNC: 4.1 G/DL (ref 3.4–5)
ALP BLD-CCNC: 74 U/L (ref 40–129)
ALT SERPL-CCNC: 27 U/L (ref 10–40)
ANION GAP SERPL CALCULATED.3IONS-SCNC: 13 MMOL/L (ref 3–16)
AST SERPL-CCNC: 22 U/L (ref 15–37)
BASOPHILS ABSOLUTE: 0.1 K/UL (ref 0–0.2)
BASOPHILS RELATIVE PERCENT: 1 %
BILIRUB SERPL-MCNC: <0.2 MG/DL (ref 0–1)
BUN BLDV-MCNC: 10 MG/DL (ref 7–20)
CALCIUM SERPL-MCNC: 9.4 MG/DL (ref 8.3–10.6)
CHLORIDE BLD-SCNC: 103 MMOL/L (ref 99–110)
CHOLESTEROL, TOTAL: 163 MG/DL (ref 0–199)
CO2: 24 MMOL/L (ref 21–32)
CREAT SERPL-MCNC: 0.8 MG/DL (ref 0.6–1.1)
EOSINOPHILS ABSOLUTE: 0.4 K/UL (ref 0–0.6)
EOSINOPHILS RELATIVE PERCENT: 5.2 %
GFR AFRICAN AMERICAN: >60
GFR NON-AFRICAN AMERICAN: >60
GLOBULIN: 3.7 G/DL
GLUCOSE BLD-MCNC: 74 MG/DL (ref 70–99)
HCT VFR BLD CALC: 43.1 % (ref 36–48)
HDLC SERPL-MCNC: 38 MG/DL (ref 40–60)
HEMOGLOBIN: 14.9 G/DL (ref 12–16)
LDL CHOLESTEROL CALCULATED: 96 MG/DL
LYMPHOCYTES ABSOLUTE: 3.2 K/UL (ref 1–5.1)
LYMPHOCYTES RELATIVE PERCENT: 38.5 %
MCH RBC QN AUTO: 30 PG (ref 26–34)
MCHC RBC AUTO-ENTMCNC: 34.5 G/DL (ref 31–36)
MCV RBC AUTO: 87 FL (ref 80–100)
MONOCYTES ABSOLUTE: 0.7 K/UL (ref 0–1.3)
MONOCYTES RELATIVE PERCENT: 8.7 %
NEUTROPHILS ABSOLUTE: 3.9 K/UL (ref 1.7–7.7)
NEUTROPHILS RELATIVE PERCENT: 46.6 %
PDW BLD-RTO: 13.2 % (ref 12.4–15.4)
PLATELET # BLD: 235 K/UL (ref 135–450)
PMV BLD AUTO: 9.3 FL (ref 5–10.5)
POTASSIUM SERPL-SCNC: 4.7 MMOL/L (ref 3.5–5.1)
RBC # BLD: 4.95 M/UL (ref 4–5.2)
SODIUM BLD-SCNC: 140 MMOL/L (ref 136–145)
TOTAL PROTEIN: 7.8 G/DL (ref 6.4–8.2)
TRIGL SERPL-MCNC: 144 MG/DL (ref 0–150)
TSH REFLEX: 2.19 UIU/ML (ref 0.27–4.2)
VLDLC SERPL CALC-MCNC: 29 MG/DL
WBC # BLD: 8.3 K/UL (ref 4–11)

## 2020-02-12 PROCEDURE — G8484 FLU IMMUNIZE NO ADMIN: HCPCS | Performed by: NURSE PRACTITIONER

## 2020-02-12 PROCEDURE — 99396 PREV VISIT EST AGE 40-64: CPT | Performed by: NURSE PRACTITIONER

## 2020-02-12 RX ORDER — DOXYCYCLINE HYCLATE 100 MG
100 TABLET ORAL 2 TIMES DAILY
Qty: 14 TABLET | Refills: 0 | Status: SHIPPED | OUTPATIENT
Start: 2020-02-12 | End: 2020-02-19

## 2020-02-12 RX ORDER — LEVOTHYROXINE SODIUM 0.1 MG/1
100 TABLET ORAL DAILY
Qty: 90 TABLET | Refills: 1 | Status: SHIPPED | OUTPATIENT
Start: 2020-02-12 | End: 2020-07-08 | Stop reason: SDUPTHER

## 2020-02-12 RX ORDER — LISINOPRIL 10 MG/1
10 TABLET ORAL DAILY
Qty: 90 TABLET | Refills: 1 | Status: SHIPPED | OUTPATIENT
Start: 2020-02-12 | End: 2020-07-08 | Stop reason: SDUPTHER

## 2020-02-12 RX ORDER — EZETIMIBE 10 MG/1
10 TABLET ORAL DAILY
Qty: 90 TABLET | Refills: 1 | Status: SHIPPED | OUTPATIENT
Start: 2020-02-12 | End: 2020-07-08 | Stop reason: SDUPTHER

## 2020-02-12 ASSESSMENT — ENCOUNTER SYMPTOMS
COUGH: 0
DIARRHEA: 0
VOMITING: 0
CHEST TIGHTNESS: 0
CONSTIPATION: 0
NAUSEA: 0
SHORTNESS OF BREATH: 0
ABDOMINAL PAIN: 0
WHEEZING: 0

## 2020-02-12 ASSESSMENT — PATIENT HEALTH QUESTIONNAIRE - PHQ9
SUM OF ALL RESPONSES TO PHQ QUESTIONS 1-9: 0
2. FEELING DOWN, DEPRESSED OR HOPELESS: 0
SUM OF ALL RESPONSES TO PHQ9 QUESTIONS 1 & 2: 0
1. LITTLE INTEREST OR PLEASURE IN DOING THINGS: 0
SUM OF ALL RESPONSES TO PHQ QUESTIONS 1-9: 0

## 2020-02-12 NOTE — PROGRESS NOTES
History     Socioeconomic History    Marital status: Single     Spouse name: None    Number of children: None    Years of education: None    Highest education level: None   Occupational History    None   Social Needs    Financial resource strain: None    Food insecurity:     Worry: None     Inability: None    Transportation needs:     Medical: None     Non-medical: None   Tobacco Use    Smoking status: Current Every Day Smoker     Packs/day: 0.50     Years: 20.00     Pack years: 10.00     Types: Cigarettes     Last attempt to quit: 2019     Years since quittin.9    Smokeless tobacco: Never Used    Tobacco comment: 19 quit night before dental procedure   Substance and Sexual Activity    Alcohol use: No    Drug use: No    Sexual activity: Yes     Partners: Male   Lifestyle    Physical activity:     Days per week: None     Minutes per session: None    Stress: None   Relationships    Social connections:     Talks on phone: None     Gets together: None     Attends Jew service: None     Active member of club or organization: None     Attends meetings of clubs or organizations: None     Relationship status: None    Intimate partner violence:     Fear of current or ex partner: None     Emotionally abused: None     Physically abused: None     Forced sexual activity: None   Other Topics Concern    None   Social History Narrative    None         Hypothyroidism  Stable dose. No heat or cold intolerance. Denies changes to skin, nails. Feels hair is thinning. Current smoker  She did quit. Now smoking 4 cigarettes or less a day. Essential hypertension  Stable. Patient denies any exertional chest pain, dyspnea, palpitations, syncope, orthopnea, edema or paroxysmal nocturnal dyspnea. Hyperlipidemia LDL goal <130  Taking zetia. Exercising 3 days a week. Review of Systems   Constitutional: Negative for appetite change, chills, fatigue and unexpected weight change. Respiratory: Negative for cough, chest tightness, shortness of breath and wheezing. Cardiovascular: Negative for chest pain, palpitations and leg swelling. Gastrointestinal: Negative for abdominal pain, constipation, diarrhea, nausea and vomiting. Musculoskeletal: Negative for arthralgias. Neurological: Negative for weakness, numbness and headaches. Hematological: Negative for adenopathy. OBJECTIVE:    /76 (Site: Left Upper Arm, Position: Sitting, Cuff Size: Medium Adult)   Pulse 78   Resp 16   Wt 218 lb 9.6 oz (99.2 kg)   SpO2 98%   BMI 35.28 kg/m²     Physical Exam  Constitutional:       Appearance: She is well-developed. HENT:      Head: Normocephalic and atraumatic. Right Ear: Hearing, tympanic membrane, ear canal and external ear normal.      Left Ear: Hearing, tympanic membrane, ear canal and external ear normal.      Nose: Nose normal.      Mouth/Throat:      Mouth: Mucous membranes are moist.      Pharynx: Uvula midline. Eyes:      Conjunctiva/sclera: Conjunctivae normal.      Pupils: Pupils are equal, round, and reactive to light. Neck:      Musculoskeletal: Normal range of motion and neck supple. Vascular: No carotid bruit or JVD. Cardiovascular:      Rate and Rhythm: Normal rate and regular rhythm. Heart sounds: Normal heart sounds. No murmur. No friction rub. No gallop. Pulmonary:      Effort: Pulmonary effort is normal. No respiratory distress. Breath sounds: Normal breath sounds. No wheezing, rhonchi or rales. Abdominal:      General: Bowel sounds are normal. There is no distension. Palpations: Abdomen is soft. Abdomen is not rigid. Tenderness: There is no abdominal tenderness. There is no guarding. Musculoskeletal: Normal range of motion. Lymphadenopathy:      Cervical: No cervical adenopathy. Right cervical: No superficial or posterior cervical adenopathy.      Left cervical: No superficial or posterior cervical adenopathy. Skin:     General: Skin is warm and dry. Comments: Right ring finger with redness and swelling over knuckle   Neurological:      Mental Status: She is alert and oriented to person, place, and time. Cranial Nerves: No cranial nerve deficit. Psychiatric:         Mood and Affect: Mood normal.         Behavior: Behavior normal.         ASSESSMENT/PLAN:    1. Acquired hypothyroidism  Refilled. Rechedk today  - TSH with Reflex; Future  - levothyroxine (SYNTHROID) 100 MCG tablet; Take 1 tablet by mouth Daily  Dispense: 90 tablet; Refill: 1    2. Current smoker  Counseled on cessation    3. Essential hypertension  Labs today. Lisinopril refilled  - CBC Auto Differential; Future  - Comprehensive Metabolic Panel; Future  - lisinopril (PRINIVIL;ZESTRIL) 10 MG tablet; Take 1 tablet by mouth daily  Dispense: 90 tablet; Refill: 1    4. Encounter for annual physical exam  Labs today  - CBC Auto Differential; Future  - Comprehensive Metabolic Panel; Future  - Lipid Panel; Future    5. Hyperlipidemia LDL goal <130  zetia refilled. Labs today. The patient is asked to make an attempt to improve diet and exercise patterns to aid in medical management of this problem. - Lipid Panel; Future  - ezetimibe (ZETIA) 10 MG tablet; Take 1 tablet by mouth daily  Dispense: 90 tablet; Refill: 1    6. Cellulitis of finger of right hand  Epsom salt soaks. Antibiotic as prescribed. Follow up if no improvement. - doxycycline hyclate (VIBRA-TABS) 100 MG tablet; Take 1 tablet by mouth 2 times daily for 7 days  Dispense: 14 tablet; Refill: 0    7. Colon cancer screening  - Cologuard (For External Results Only); Future    8. Encounter for screening mammogram for breast cancer  - [facility] Digital Screen Bilateral [[id number]]; Future               Return in about 1 year (around 2/12/2021), or if symptoms worsen or fail to improve.       (Please note that portions of this note may have been completed with a voice recognition program.

## 2020-03-04 ENCOUNTER — OFFICE VISIT (OUTPATIENT)
Dept: SURGERY | Age: 55
End: 2020-03-04
Payer: COMMERCIAL

## 2020-03-04 VITALS
HEART RATE: 86 BPM | WEIGHT: 215.4 LBS | OXYGEN SATURATION: 98 % | DIASTOLIC BLOOD PRESSURE: 96 MMHG | BODY MASS INDEX: 34.62 KG/M2 | HEIGHT: 66 IN | SYSTOLIC BLOOD PRESSURE: 140 MMHG

## 2020-03-04 PROCEDURE — G8417 CALC BMI ABV UP PARAM F/U: HCPCS | Performed by: SURGERY

## 2020-03-04 PROCEDURE — 3017F COLORECTAL CA SCREEN DOC REV: CPT | Performed by: SURGERY

## 2020-03-04 PROCEDURE — G8427 DOCREV CUR MEDS BY ELIG CLIN: HCPCS | Performed by: SURGERY

## 2020-03-04 PROCEDURE — G8484 FLU IMMUNIZE NO ADMIN: HCPCS | Performed by: SURGERY

## 2020-03-04 PROCEDURE — 99213 OFFICE O/P EST LOW 20 MIN: CPT | Performed by: SURGERY

## 2020-03-04 PROCEDURE — 4004F PT TOBACCO SCREEN RCVD TLK: CPT | Performed by: SURGERY

## 2020-03-04 RX ORDER — SODIUM, POTASSIUM,MAG SULFATES 17.5-3.13G
2 SOLUTION, RECONSTITUTED, ORAL ORAL ONCE
Qty: 2 BOTTLE | Refills: 0 | Status: SHIPPED | OUTPATIENT
Start: 2020-03-04 | End: 2020-03-04

## 2020-03-05 ASSESSMENT — ENCOUNTER SYMPTOMS
ANAL BLEEDING: 0
SORE THROAT: 0
STRIDOR: 0
EYE ITCHING: 0
EYE REDNESS: 0
RECTAL PAIN: 0
PHOTOPHOBIA: 0
COLOR CHANGE: 0
CHOKING: 0
BACK PAIN: 0
CONSTIPATION: 0
APNEA: 0

## 2020-03-05 NOTE — PROGRESS NOTES
Constitutional: Negative for chills and fever. HENT: Negative for ear pain, mouth sores, sore throat and tinnitus. Eyes: Negative for photophobia, redness and itching. Respiratory: Negative for apnea, choking and stridor. Cardiovascular: Negative for chest pain and palpitations. Gastrointestinal: Negative for anal bleeding, constipation and rectal pain. Endocrine: Negative for polydipsia. Genitourinary: Negative for enuresis, flank pain and hematuria. Musculoskeletal: Negative for back pain, joint swelling and myalgias. Skin: Negative for color change and pallor. Allergic/Immunologic: Negative for environmental allergies. Neurological: Negative for syncope and speech difficulty. Psychiatric/Behavioral: Negative for confusion and hallucinations. OBJECTIVE:  Physical Exam:    BP (!) 140/96 (Site: Left Upper Arm, Position: Sitting, Cuff Size: Large Adult)   Pulse 86   Ht 5' 6\" (1.676 m)   Wt 215 lb 6.4 oz (97.7 kg)   SpO2 98%   BMI 34.77 kg/m²      Physical Exam  Constitutional:       Appearance: She is well-developed. HENT:      Head: Normocephalic. Eyes:      Pupils: Pupils are equal, round, and reactive to light. Neck:      Musculoskeletal: Normal range of motion and neck supple. Cardiovascular:      Rate and Rhythm: Normal rate. Pulmonary:      Effort: Pulmonary effort is normal.   Abdominal:      General: There is no distension. Palpations: Abdomen is soft. There is no mass. Tenderness: There is no abdominal tenderness. There is no guarding or rebound. Musculoskeletal: Normal range of motion. Skin:     General: Skin is warm. Neurological:      Mental Status: She is alert and oriented to person, place, and time. ASSESSMENT:  1. Screen for colon cancer    2. Positive colorectal cancer screening using Cologuard test          PLAN:  Treatment:  Will proceed with colonoscopy.   Patient counseled on risks, benefits, and alternatives of treatment

## 2020-03-10 ENCOUNTER — TELEPHONE (OUTPATIENT)
Dept: SURGERY | Age: 55
End: 2020-03-10

## 2020-03-25 ENCOUNTER — TELEPHONE (OUTPATIENT)
Dept: FAMILY MEDICINE CLINIC | Age: 55
End: 2020-03-25

## 2020-03-25 ENCOUNTER — NURSE TRIAGE (OUTPATIENT)
Dept: OTHER | Facility: CLINIC | Age: 55
End: 2020-03-25

## 2020-03-25 NOTE — TELEPHONE ENCOUNTER
Reason for Disposition   [1] Fever or feeling feverish AND [2] within 14 Days of COVID-19 EXPOSURE (Close Contact)    Answer Assessment - Initial Assessment Questions  1. CONFIRMED CASE: \"Who is the person with the confirmed COVID-19 infection that you were exposed to? \"      Unknown   2. PLACE of CONTACT: \"Where were you when you were exposed to COVID-19  (coronavirus disease 2019)? \" (e.g., city, state, country)      unknown  3. TYPE of CONTACT: \"How much contact was there? \" (e.g., live in same house, work in same office, same school)      unknown  4. DATE of CONTACT: \"When did you have contact with a coronavirus patient? \" (e.g., days)      unknown  5. DURATION of CONTACT: \"How long were you in contact with the COVID-19 (coronavirus disease) patient? \" (e.g., a few seconds, passed by person, a few minutes, live with the patient)      unknown  6. SYMPTOMS: \"Do you have any symptoms? \" (e.g., fever, cough, breathing difficulty)      Fatigue headache sore throat low grade fever nausea sick over last three days     8. HIGH RISK: \"Do you have any heart or lung problems? Do you have a weakened immune system? \" (e.g., CHF, COPD, asthma, HIV positive, chemotherapy, renal failure, diabetes mellitus, sickle cell anemia)      Asthma bronchitis, copd  Smoker htn    Protocols used: CORONAVIRUS (COVID-19) EXPOSURE-ADULT-

## 2020-03-25 NOTE — TELEPHONE ENCOUNTER
Symptom onset 3/13/20. Started suddenly with sore throat, headache, cough, nausea, diarrhea. Her sore throat has improved. Complains of wheezing, raspy, \"barely smoking\". She has felt feverish--\"my eyeballs have burned a little\". Feels her eyes are bloodshot. Complains of worsening fatigue, SOB. She still has diarrhea. Complains of chest tightness. Denies chest pain. Appetite is down. She has taken tylenol. No known travel exposures. No recent travel. Discussed sending to  for possible covid testing. Advised to avoid ibuprofen. Continue tylenol. Plenty of rest and fluids. Quarantine self for the next 14 days. [email or web address]

## 2020-03-26 ENCOUNTER — TELEPHONE (OUTPATIENT)
Dept: FAMILY MEDICINE CLINIC | Age: 55
End: 2020-03-26

## 2020-04-07 ENCOUNTER — TELEPHONE (OUTPATIENT)
Dept: FAMILY MEDICINE CLINIC | Age: 55
End: 2020-04-07

## 2020-04-08 RX ORDER — DOXYCYCLINE HYCLATE 100 MG
100 TABLET ORAL 2 TIMES DAILY
Qty: 14 TABLET | Refills: 0 | Status: SHIPPED | OUTPATIENT
Start: 2020-04-08 | End: 2020-04-15

## 2020-04-10 ENCOUNTER — TELEPHONE (OUTPATIENT)
Dept: FAMILY MEDICINE CLINIC | Age: 55
End: 2020-04-10

## 2020-04-14 NOTE — TELEPHONE ENCOUNTER
Called office that Pt states did her testing and they took message and fax # and stated they would send message.  The office # Pt gave is [phone]

## 2020-04-16 ENCOUNTER — TELEPHONE (OUTPATIENT)
Dept: FAMILY MEDICINE CLINIC | Age: 55
End: 2020-04-16

## 2020-04-16 NOTE — TELEPHONE ENCOUNTER
I printed a note stating that the patient's COVID-19 results were negative. Please email it to her [email or web address]. She can give the note to her boyfriend herself and he can take it to his place of employment. Please do not fax directly to boyfriend's place of employment.

## 2020-06-09 ENCOUNTER — TELEPHONE (OUTPATIENT)
Dept: SURGERY | Age: 55
End: 2020-06-09

## 2020-06-24 ENCOUNTER — TELEPHONE (OUTPATIENT)
Dept: SURGERY | Age: 55
End: 2020-06-24

## 2020-07-08 ENCOUNTER — TELEPHONE (OUTPATIENT)
Dept: FAMILY MEDICINE CLINIC | Age: 55
End: 2020-07-08

## 2020-07-08 RX ORDER — EZETIMIBE 10 MG/1
10 TABLET ORAL DAILY
Qty: 90 TABLET | Refills: 1 | Status: SHIPPED | OUTPATIENT
Start: 2020-07-08 | End: 2021-02-18 | Stop reason: SDUPTHER

## 2020-07-08 RX ORDER — LISINOPRIL 10 MG/1
10 TABLET ORAL DAILY
Qty: 90 TABLET | Refills: 1 | Status: SHIPPED | OUTPATIENT
Start: 2020-07-08 | End: 2021-02-18 | Stop reason: SDUPTHER

## 2020-07-08 RX ORDER — LEVOTHYROXINE SODIUM 0.1 MG/1
100 TABLET ORAL DAILY
Qty: 90 TABLET | Refills: 1 | Status: SHIPPED | OUTPATIENT
Start: 2020-07-08 | End: 2021-02-18 | Stop reason: SDUPTHER

## 2020-07-08 NOTE — TELEPHONE ENCOUNTER
Spoke with patient, reminded them of need for routine mammogram and offered the mobile mammogram option.

## 2020-07-13 ENCOUNTER — TELEPHONE (OUTPATIENT)
Dept: BARIATRICS/WEIGHT MGMT | Age: 55
End: 2020-07-13

## 2020-08-14 ENCOUNTER — TELEPHONE (OUTPATIENT)
Dept: FAMILY MEDICINE CLINIC | Age: 55
End: 2020-08-14

## 2020-08-14 NOTE — TELEPHONE ENCOUNTER
She will need a virtual visit to discuss her symptoms. May need other testing. If SOB has worsened recommend she go to the ED. She can also return to the flu clinic to be evaluated.

## 2020-08-14 NOTE — TELEPHONE ENCOUNTER
Pt called today requesting PCP send in an order for a chest x-ray. Pt believes she has pneumonia. She states she has had SOB, painful cough, difficulty breathing, and nausea for the last month. She also had a Covid test done that came back negative. Please advise.

## 2020-08-14 NOTE — TELEPHONE ENCOUNTER
Spoke with client and shared the recommendations with verbalization of understanding . Client expressed financial concerns related to ED or VV due to limited ability to work. Shared the sliding scale options for visit to both facilities. Client reports she stopped smoking. Client will go to the ED this weekend if status becomes worse while fears expressed related to Covid -19 and being put on a vent. Emotional support offered.

## 2020-08-17 NOTE — TELEPHONE ENCOUNTER
Spoke with Mindy, she is feeling much better, her chest is not hurting at all and she can breath easier. She was very grateful for the follow up call.

## 2020-09-03 ENCOUNTER — HOSPITAL ENCOUNTER (OUTPATIENT)
Age: 55
Discharge: HOME | End: 2020-09-03
Payer: COMMERCIAL

## 2020-09-04 ENCOUNTER — HOSPITAL ENCOUNTER (OUTPATIENT)
Dept: MRI IMAGING | Age: 55
Discharge: HOME | End: 2020-09-04
Payer: COMMERCIAL

## 2020-09-04 PROCEDURE — 73221 MRI JOINT UPR EXTREM W/O DYE: CPT

## 2020-09-24 ENCOUNTER — OFFICE VISIT (OUTPATIENT)
Dept: ORTHOPEDIC SURGERY | Age: 55
End: 2020-09-24
Payer: COMMERCIAL

## 2020-09-24 VITALS
HEART RATE: 87 BPM | BODY MASS INDEX: 36 KG/M2 | OXYGEN SATURATION: 99 % | RESPIRATION RATE: 18 BRPM | WEIGHT: 224 LBS | HEIGHT: 66 IN

## 2020-09-24 PROCEDURE — 99203 OFFICE O/P NEW LOW 30 MIN: CPT | Performed by: PHYSICIAN ASSISTANT

## 2020-09-24 RX ORDER — CYCLOBENZAPRINE HCL 10 MG
10 TABLET ORAL NIGHTLY
COMMUNITY
End: 2021-02-18

## 2020-09-24 RX ORDER — HYDROCODONE BITARTRATE AND ACETAMINOPHEN 5; 325 MG/1; MG/1
1 TABLET ORAL 2 TIMES DAILY PRN
Qty: 14 TABLET | Refills: 0 | Status: SHIPPED | OUTPATIENT
Start: 2020-09-24 | End: 2020-10-01

## 2020-09-24 NOTE — PATIENT INSTRUCTIONS
Follow-up with Dr. Saunders Angry to discuss rotator cuff repair  No lifting, pushing, or pulling with the right arm  Take Norco as directed

## 2020-09-24 NOTE — PROGRESS NOTES
[facility]      HPI:  Carlene Dakins is a 54 y.o. female that presents to the office to follow-up on an injury that occurred at work on August 18, 2020 when patient was lifting an 50 lb water meter at work and felt a pop within the right shoulder that mimicked a rubber band snapping like sensation. Patient continues to have a steady, dull, throbbing pain within the right rotator cuff region throughout the arm that becomes sharp upon reaching. Pain is rated at a 7/10 in office limiting her range of motion, creating difficulty with grasp and . She has a sling which she only wears when driving and has been taking Flexeril at night which is not providing her relief or helping her sleep. The patient does have an MRI that is already completed as of September 4, 2020. I reviewed and agree with the portions of the HPI, review of systems, vital documentation and plan performed by my staff and have added/addended where appropriate. Past Medical History:   Diagnosis Date    H/O echocardiogram 08/28/2018    EF 55-60%. No significant valvular disease.  History of cardiac monitoring 07/31/2018    Conclusion: 30 days event monitor suggesting sinus rhythm with symptomatic sinus tachycardia.   So the symptoms are reported during normal rate and rhythm    Hyperlipidemia LDL goal <130 7/31/2018    Hypertension     Hypothyroidism     Tachycardia        Past Surgical History:   Procedure Laterality Date    CARPAL TUNNEL RELEASE Bilateral 2003    CHOLECYSTECTOMY  2013    ECTOPIC PREGNANCY SURGERY  1991    TUBAL LIGATION  1991       Family History   Problem Relation Age of Onset    Cancer Mother     Diabetes Mother     Hypertension Mother    [facility] Stroke Mother     Thyroid Disease Mother     Kidney Disease Mother     Glaucoma Mother     Cancer Father     Heart Disease Father     Obesity Brother     Thyroid Disease Brother     Kidney Disease Brother        Social History 1 Device 0     No current facility-administered medications for this visit. Allergies   Allergen Reactions    Meloxicam Shortness Of Breath    Penicillins Anaphylaxis    Statins Swelling     Throat swelling, vomiting    Aspirin     Cephalosporins     Keflex [Cephalexin]      Shut kidneys down    Sulfa Antibiotics     Tramadol Itching    Codeine Hives and Nausea And Vomiting    Naproxen Nausea And Vomiting     Dizzy lightheaded       Vitals:    09/24/20 1534   Pulse: 87   Resp: 18   SpO2: 99%   Weight: 224 lb (101.6 kg)   Height: 5' 6\" (1.676 m)         Review of Systems:   Review of Systems   Constitutional: Negative. HENT: Negative. Eyes: Negative. Respiratory: Negative. Cardiovascular: Negative. Gastrointestinal: Negative. Genitourinary: Negative. Musculoskeletal: Positive for arthralgias and myalgias. Skin: Negative. Neurological: Negative. Psychiatric/Behavioral: Negative. Physical Exam:   Gen/Psych:Examination reveals a pleasant individual in no acute distress. The patient is oriented to time, place and person. The patient's mood and affect are appropriate. Patient appears well nourished. Body habitus is overweight    HEENT: Head is atraumatic normocephalic, ears are symmetric, eyes show equal pupils bilaterally, extraocular muscles intact. Hearing is intact to normal voice at 5 feet. Nares are patent bilaterally, no epistaxis, no rhinorrhea. Lymph:  No obvious lymphedema in bilateral upper extremities     Skin: Intact in bilateral upper extremities with no ulcerations, lesions, rash, erythema. Vascular: There are no varicosities in bilateral upper  extremities, sensation intact to light touch over bilateral upper extremities.     Musculoskeletal:  Right shoulder exam:  Skin:  Clear with no erythema, there is no significant joint effusion  Deformity:  none  Atrophy:  none  Tenderness: Greater tuberosity, posterior acromion, subacromial

## 2020-10-01 ASSESSMENT — ENCOUNTER SYMPTOMS
RESPIRATORY NEGATIVE: 1
GASTROINTESTINAL NEGATIVE: 1
EYES NEGATIVE: 1

## 2020-10-12 ENCOUNTER — TELEPHONE (OUTPATIENT)
Dept: ORTHOPEDIC SURGERY | Age: 55
End: 2020-10-12

## 2020-10-12 ENCOUNTER — OFFICE VISIT (OUTPATIENT)
Dept: ORTHOPEDIC SURGERY | Age: 55
End: 2020-10-12
Payer: COMMERCIAL

## 2020-10-12 VITALS — WEIGHT: 225 LBS | RESPIRATION RATE: 18 BRPM | HEIGHT: 66 IN | BODY MASS INDEX: 36.16 KG/M2

## 2020-10-12 PROCEDURE — 99213 OFFICE O/P EST LOW 20 MIN: CPT | Performed by: ORTHOPAEDIC SURGERY

## 2020-10-12 RX ORDER — HYDROCODONE BITARTRATE AND ACETAMINOPHEN 5; 325 MG/1; MG/1
1 TABLET ORAL EVERY 8 HOURS PRN
COMMUNITY
End: 2021-01-15 | Stop reason: ALTCHOICE

## 2020-10-12 ASSESSMENT — ENCOUNTER SYMPTOMS
COLOR CHANGE: 0
SHORTNESS OF BREATH: 0

## 2020-10-12 NOTE — PROGRESS NOTES
Constitutional: Negative for activity change, chills and fever. Respiratory: Negative for shortness of breath. Cardiovascular: Negative for chest pain. Musculoskeletal: Positive for arthralgias and myalgias. Negative for gait problem and joint swelling. Skin: Negative for color change, pallor, rash and wound. Neurological: Positive for weakness. Negative for numbness. Past Medical History:   Diagnosis Date    H/O echocardiogram 08/28/2018    EF 55-60%. No significant valvular disease.  History of cardiac monitoring 07/31/2018    Conclusion: 30 days event monitor suggesting sinus rhythm with symptomatic sinus tachycardia. So the symptoms are reported during normal rate and rhythm    Hyperlipidemia LDL goal <130 7/31/2018    Hypertension     Hypothyroidism     Tachycardia        Objective:   Physical Exam  Constitutional:       Appearance: She is well-developed. HENT:      Head: Normocephalic and atraumatic. Eyes:      Pupils: Pupils are equal, round, and reactive to light. Neck:      Musculoskeletal: Normal range of motion. Pulmonary:      Effort: Pulmonary effort is normal.   Musculoskeletal:         General: Tenderness present. No deformity. Right shoulder: She exhibits tenderness, crepitus, pain and decreased strength. She exhibits normal range of motion, no bony tenderness, no swelling, no effusion, no deformity, no laceration, no spasm and normal pulse. Left shoulder: She exhibits normal range of motion, no tenderness, no bony tenderness, no swelling, no effusion, no crepitus, no deformity, no laceration, no pain, no spasm, normal pulse and normal strength. Right elbow: Normal.     Left elbow: Normal.   Skin:     General: Skin is warm and dry. Coloration: Skin is not pale. Findings: No erythema or rash. Neurological:      Mental Status: She is alert and oriented to person, place, and time. Sensory: No sensory deficit.      Right shoulder-Skin intact with no erythema, ecchymosis or lacerations present. Flexion 180  Abduction 180  External rotation 90  Internal rotation 90  Positive Conrad sign. Strength 4/5 to resisted abduction. MRI of the right shoulder from September 4, 2020 reviewed by me today in the office demonstrates a type II acromion, moderate hypertrophy and degenerative changes of the acromioclavicular joint, there is a complete full-thickness tear through the supraspinatus tendon with approximately 2.4 cm of retraction and approximately 50% muscle belly atrophy of the supraspinatus. Assessment:      Right shoulder rotator cuff tear      Plan:      I discussed with her today her MRI findings. I explained to her that she does have a tear of her right shoulder rotator cuff. At this point given her persistent symptoms and with her MRI findings I recommend surgical treatment. I discussed with her today performing right shoulder arthroscopy with subacromial decompression, distal clavicle resection, debridement and rotator cuff repair. I explained risks, benefits, possible complications of the procedure and answered all questions for the patient. I explained postoperative rehabilitation protocol and expectations with the patient today. The patient understands and consents to the procedure. Patient will follow up with their primary care physician prior to surgical treatment for preoperative clearance. We will schedule surgery at soonest convenience. Continue weight-bearing as tolerated. Continue range of motion exercises as instructed. Ice and elevate as needed. Tylenol or Motrin for pain. Follow up in 2 weeks postop.   We will schedule surgery once we have [facility] approval.        Faby Posada,

## 2020-10-12 NOTE — PATIENT INSTRUCTIONS
Continue no use of right upper extremity   Continue sling as needed   Office will submit for surgery approval   Once approved office will schedule right rotator cuff repair   Obtain any clearances if needed, as discussed   COVID testing ordered, pre-op testing discussed   Follow up for surgery once approved, as discussed     Call Aisha, surgery scheduler with any questions   Ph- [phone]  Call Tila Garcias with any [address] [facility] questions [phone]

## 2020-12-21 ENCOUNTER — ANESTHESIA EVENT (OUTPATIENT)
Dept: OPERATING ROOM | Age: 55
End: 2020-12-21
Payer: COMMERCIAL

## 2020-12-23 ENCOUNTER — HOSPITAL ENCOUNTER (OUTPATIENT)
Dept: PREADMISSION TESTING | Age: 55
Discharge: HOME | End: 2020-12-27
Payer: COMMERCIAL

## 2020-12-23 VITALS
WEIGHT: 211 LBS | BODY MASS INDEX: 33.91 KG/M2 | HEIGHT: 66 IN | TEMPERATURE: 97.2 F | HEART RATE: 81 BPM | OXYGEN SATURATION: 97 % | RESPIRATION RATE: 18 BRPM | SYSTOLIC BLOOD PRESSURE: 128 MMHG | DIASTOLIC BLOOD PRESSURE: 92 MMHG

## 2020-12-23 LAB
ANION GAP SERPL CALCULATED.3IONS-SCNC: 11 MMOL/L (ref 4–16)
BUN BLDV-MCNC: 8 MG/DL (ref 6–23)
CALCIUM SERPL-MCNC: 9.3 MG/DL (ref 8.3–10.6)
CHLORIDE BLD-SCNC: 103 MMOL/L (ref 99–110)
CO2: 22 MMOL/L (ref 21–32)
CREAT SERPL-MCNC: 1 MG/DL (ref 0.6–1.1)
GFR AFRICAN AMERICAN: >60 ML/MIN/1.73M2
GFR NON-AFRICAN AMERICAN: 58 ML/MIN/1.73M2
GLUCOSE BLD-MCNC: 103 MG/DL (ref 70–99)
HCT VFR BLD CALC: 48.2 % (ref 37–47)
HEMOGLOBIN: 15.9 GM/DL (ref 12.5–16)
MCH RBC QN AUTO: 29 PG (ref 27–31)
MCHC RBC AUTO-ENTMCNC: 33 % (ref 32–36)
MCV RBC AUTO: 88 FL (ref 78–100)
PDW BLD-RTO: 12.4 % (ref 11.7–14.9)
PLATELET # BLD: 245 K/CU MM (ref 140–440)
PMV BLD AUTO: 10.5 FL (ref 7.5–11.1)
POTASSIUM SERPL-SCNC: 4.1 MMOL/L (ref 3.5–5.1)
RBC # BLD: 5.48 M/CU MM (ref 4.2–5.4)
SODIUM BLD-SCNC: 136 MMOL/L (ref 135–145)
WBC # BLD: 8.9 K/CU MM (ref 4–10.5)

## 2020-12-23 PROCEDURE — U0002 COVID-19 LAB TEST NON-CDC: HCPCS

## 2020-12-23 PROCEDURE — 85027 COMPLETE CBC AUTOMATED: CPT

## 2020-12-23 PROCEDURE — 80048 BASIC METABOLIC PNL TOTAL CA: CPT

## 2020-12-23 PROCEDURE — 36415 COLL VENOUS BLD VENIPUNCTURE: CPT

## 2020-12-23 PROCEDURE — C9803 HOPD COVID-19 SPEC COLLECT: HCPCS

## 2020-12-23 RX ORDER — ALBUTEROL SULFATE 90 UG/1
2 AEROSOL, METERED RESPIRATORY (INHALATION) EVERY 6 HOURS PRN
COMMUNITY
End: 2021-02-18

## 2020-12-23 NOTE — ANESTHESIA PRE PROCEDURE
Ready to quit: Not Answered  Counseling given: Not Answered      Vital Signs (Current): There were no vitals filed for this visit. BP Readings from Last 3 Encounters:   12/23/20 (!) 128/92   03/04/20 (!) 140/96   02/12/20 120/76       NPO Status:                                                                                 BMI:   Wt Readings from Last 3 Encounters:   12/23/20 211 lb (95.7 kg)   10/12/20 225 lb (102.1 kg)   09/24/20 224 lb (101.6 kg)     There is no height or weight on file to calculate BMI.    CBC:   Lab Results   Component Value Date    WBC 8.9 12/23/2020    RBC 5.48 12/23/2020    HGB 15.9 12/23/2020    HCT 48.2 12/23/2020    MCV 88.0 12/23/2020    RDW 12.4 12/23/2020     12/23/2020       CMP:   Lab Results   Component Value Date     02/12/2020    K 4.7 02/12/2020     02/12/2020    CO2 24 02/12/2020    BUN 10 02/12/2020    CREATININE 0.8 02/12/2020    GFRAA >60 02/12/2020    AGRATIO 1.1 02/12/2020    LABGLOM >60 02/12/2020    GLUCOSE 74 02/12/2020    PROT 7.8 02/12/2020    CALCIUM 9.4 02/12/2020    BILITOT <0.2 02/12/2020    ALKPHOS 74 02/12/2020    AST 22 02/12/2020    ALT 27 02/12/2020       POC Tests: No results for input(s): POCGLU, POCNA, POCK, POCCL, POCBUN, POCHEMO, POCHCT in the last 72 hours.     Coags:   Lab Results   Component Value Date    PROTIME 11.5 05/22/2019    INR 1.01 05/22/2019    APTT 28.1 05/22/2019       HCG (If Applicable): No results found for: PREGTESTUR, PREGSERUM, HCG, HCGQUANT     ABGs: No results found for: PHART, PO2ART, TIX0QEX, BNA0TJB, BEART, X1OFZNTA     Type & Screen (If Applicable):  No results found for: LABABO, LABRH    Drug/Infectious Status (If Applicable):  No results found for: HIV, HEPCAB    COVID-19 Screening (If Applicable): No results found for: COVID19      Anesthesia Evaluation Patient summary reviewed and Nursing notes reviewed history of anesthetic complications:   Airway:         Dental:          Pulmonary:   (+) COPD: moderate,  current smoker          Patient smoked on day of surgery. Cardiovascular:  Exercise tolerance: good (>4 METS),   (+) hypertension: mild, hyperlipidemia      ECG reviewed      Echocardiogram reviewed         Beta Blocker:  Not on Beta Blocker      ROS comment:  Summary   Left ventricular systolic function is normal with an ejection fraction of   55-60%. Grade I diastolic dysfunction. No significant valvular disease noted. No evidence of pericardial effusion. Signature      ------------------------------------------------------------------   Electronically signed by Autumn Messer MD   (Interpreting physician) on 08/28/2018 at 03:25 PM   ------------------------------------------------------------------     Neuro/Psych:   (+) psychiatric history:depression/anxiety             GI/Hepatic/Renal:             Endo/Other:    (+) hypothyroidism: arthritis: OA., . Pt had no PAT visit       Abdominal:           Vascular: negative vascular ROS. Anesthesia Plan      general and regional     ASA 3     (Chart review and PAT. )                            HUMA David - ROSCOE   12/23/2020

## 2020-12-23 NOTE — ANESTHESIA PRE PROCEDURE
Department of Anesthesiology  Preprocedure Note       Name:  Nathen Mckinnon   Age:  54 y.o.  :  1965                                          MRN:  [id number]         Date:  2020      Surgeon: Sita Barnes):  Mars Umanzor DO    Procedure: Procedure(s):  RIGHT SHOULDER ARTHROSCOPY, SUBACROMIAL DECOMPRESSION, DISTAL CLAVICLE RESECTION DEBRIDEMENT ROTATOR CUFF REPAIR    Medications prior to admission:   Prior to Admission medications    Medication Sig Start Date End Date Taking? Authorizing Provider   albuterol sulfate HFA (VENTOLIN HFA) 108 (90 Base) MCG/ACT inhaler Inhale 2 puffs into the lungs every 6 hours as needed for Wheezing    Historical Provider, MD   HYDROcodone-acetaminophen (NORCO) 5-325 MG per tablet Take 1 tablet by mouth every 8 hours as needed for Pain. Historical Provider, MD   cyclobenzaprine (FLEXERIL) 10 MG tablet Take 10 mg by mouth nightly    Historical Provider, MD   ezetimibe (ZETIA) 10 MG tablet Take 1 tablet by mouth daily 20   HUMA Armas CNP   levothyroxine (SYNTHROID) 100 MCG tablet Take 1 tablet by mouth Daily 20   Rodhilary Ellison APRCASTRO - CNP   lisinopril (PRINIVIL;ZESTRIL) 10 MG tablet Take 1 tablet by mouth daily 20   HUMA Armas CNP       Current medications:    No current facility-administered medications for this encounter. Current Outpatient Medications   Medication Sig Dispense Refill    albuterol sulfate HFA (VENTOLIN HFA) 108 (90 Base) MCG/ACT inhaler Inhale 2 puffs into the lungs every 6 hours as needed for Wheezing      HYDROcodone-acetaminophen (NORCO) 5-325 MG per tablet Take 1 tablet by mouth every 8 hours as needed for Pain.        cyclobenzaprine (FLEXERIL) 10 MG tablet Take 10 mg by mouth nightly      ezetimibe (ZETIA) 10 MG tablet Take 1 tablet by mouth daily 90 tablet 1    levothyroxine (SYNTHROID) 100 MCG tablet Take 1 tablet by mouth Daily 90 tablet 1  lisinopril (PRINIVIL;ZESTRIL) 10 MG tablet Take 1 tablet by mouth daily 90 tablet 1       Allergies: Allergies   Allergen Reactions    Keflex [Cephalexin]      Shut kidneys down    Meloxicam Shortness Of Breath    Penicillins Anaphylaxis    Statins Swelling     Throat swelling, vomiting    Cephalosporins Itching    Sulfa Antibiotics Itching    Tramadol Itching    Aspirin     Codeine Hives and Nausea And Vomiting    Naproxen Nausea And Vomiting     Dizzy lightheaded       Problem List:    Patient Active Problem List   Diagnosis Code    Hypothyroidism E03.9    Essential hypertension I10    Current smoker F17.200    Tachycardia R00.0    Hyperlipidemia LDL goal <130 E78.5    Thyroid nodule E04.1    Wheezing R06.2       Past Medical History:        Diagnosis Date    COPD (chronic obstructive pulmonary disease) ([facility])     Denies COPD, uses inhaler for SOB - prn - hx: smoking    H/O echocardiogram 08/28/2018    EF 55-60%. No significant valvular disease.  Hernia, umbilical     History of cardiac monitoring 07/31/2018    Conclusion: 30 days event monitor suggesting sinus rhythm with symptomatic sinus tachycardia. So the symptoms are reported during normal rate and rhythm    Hyperlipidemia LDL goal <130 7/31/2018    Hypertension     Follows with PCP    Hypothyroidism     Prolonged emergence from general anesthesia     Tachycardia     Wears partial dentures        Past Surgical History:        Procedure Laterality Date    CARDIAC CATHETERIZATION  2009    [place] - [place]   [address] Bilateral 2003    CHOLECYSTECTOMY  2013    ECTOPIC PREGNANCY SURGERY  1991    TUBAL LIGATION  1991       Social History:    Social History     Tobacco Use    Smoking status: Current Every Day Smoker     Packs/day: 0.50     Years: 33.00     Pack years: 16.50     Types: Cigarettes     Start date: 1977    Smokeless tobacco: Never Used   Substance Use Topics    Alcohol use:  No Ready to quit: Not Answered  Counseling given: Not Answered      Vital Signs (Current): There were no vitals filed for this visit. BP Readings from Last 3 Encounters:   12/23/20 (!) 128/92   03/04/20 (!) 140/96   02/12/20 120/76       NPO Status:                                                                                 BMI:   Wt Readings from Last 3 Encounters:   12/23/20 211 lb (95.7 kg)   10/12/20 225 lb (102.1 kg)   09/24/20 224 lb (101.6 kg)     There is no height or weight on file to calculate BMI.    CBC:   Lab Results   Component Value Date    WBC 8.9 12/23/2020    RBC 5.48 12/23/2020    HGB 15.9 12/23/2020    HCT 48.2 12/23/2020    MCV 88.0 12/23/2020    RDW 12.4 12/23/2020     12/23/2020       CMP:   Lab Results   Component Value Date     12/23/2020    K 4.1 12/23/2020     12/23/2020    CO2 22 12/23/2020    BUN 8 12/23/2020    CREATININE 1.0 12/23/2020    GFRAA >60 12/23/2020    AGRATIO 1.1 02/12/2020    LABGLOM 58 12/23/2020    GLUCOSE 103 12/23/2020    PROT 7.8 02/12/2020    CALCIUM 9.3 12/23/2020    BILITOT <0.2 02/12/2020    ALKPHOS 74 02/12/2020    AST 22 02/12/2020    ALT 27 02/12/2020       POC Tests: No results for input(s): POCGLU, POCNA, POCK, POCCL, POCBUN, POCHEMO, POCHCT in the last 72 hours. Coags:   Lab Results   Component Value Date    PROTIME 11.5 05/22/2019    INR 1.01 05/22/2019    APTT 28.1 05/22/2019       HCG (If Applicable): No results found for: PREGTESTUR, PREGSERUM, HCG, HCGQUANT     ABGs: No results found for: PHART, PO2ART, RMA9UJR, ADB0SJO, BEART, X5PHFJQT     Type & Screen (If Applicable):  No results found for: LABABO, LABRH    Drug/Infectious Status (If Applicable):  No results found for: HIV, HEPCAB    COVID-19 Screening (If Applicable): No results found for: COVID19           Anesthesia Plan      general and regional     ASA 3     (Chart review and PAT.   covid - Risks benefits of block and geta discussed pt agrees to proceed)  Induction: intravenous and inhalational.    MIPS: Postoperative opioids intended and Prophylactic antiemetics administered. Anesthetic plan and risks discussed with patient. Use of blood products discussed with patient whom consented to blood products.    Plan discussed with CRNA and surgical team.              HUMA Gross - ROSCOE   12/23/2020

## 2020-12-23 NOTE — PROGRESS NOTES
.Surgery 12/31/20               1. Do not eat or drink anything within 8 hours of your surgery - unless instructed by your doctor prior to surgery. This includes                   no water, chewing gum or mints. 2. Follow your directions as prescribed by the doctor for your procedure and medications. 3. Check with your Doctor regarding stopping Plavix, Coumadin, Lovenox,Effient,Pradaxa,Xarelto, Fragmin or other blood thinners and                   follow their instructions. 4. Do not smoke, and do not drink any alcoholic beverages 24 hours prior to surgery. This includes NA Beer. 5. You may brush your teeth and gargle the morning of surgery. DO NOT SWALLOW WATER   6. You MUST make arrangements for a responsible adult to take you home after your surgery and be able to check on you every couple                   hours for the day. You will not be allowed to leave alone or drive yourself home. It is strongly suggested someone stay with you the first 24                   hrs. Your surgery will be cancelled if you do not have a ride home. 7. Please wear simple, loose fitting clothing to the hospital.  Homar Avina not bring valuables (money, credit cards, checkbooks, etc.) Do not wear any                   makeup (including no eye makeup) or nail polish on your fingers or toes. 8. DO NOT wear any jewelry or piercings on day of surgery. All body piercing jewelry must be removed. 9. If you have dentures, they will be removed before going to the OR; we will provide you a container. If you wear contact lenses or glasses,                  they will be removed; please bring a case for them. 10. If you  have a Living Will and Durable Power of  for Healthcare, please bring in a copy. 11. Please bring picture ID,  insurance card, paperwork from the doctors office    (H & P, Consent, & card for implantable devices).            12. Take a shower the night before or morning of your procedure with the soaps provided, do not apply any lotion, oil or powder. 13. Wear a mask covering your nose & mouth when entering the hospital. Have your covid-19 test performed within 10 days of your                  Surgery (tested 12/23/20). Quarantine yourself after the test until after your surgery.

## 2020-12-24 LAB
SARS-COV-2: NOT DETECTED
SOURCE: NORMAL

## 2020-12-28 RX ORDER — OXYCODONE HYDROCHLORIDE AND ACETAMINOPHEN 5; 325 MG/1; MG/1
1 TABLET ORAL EVERY 6 HOURS PRN
Qty: 25 TABLET | Refills: 0 | Status: SHIPPED | OUTPATIENT
Start: 2020-12-28 | End: 2021-01-04

## 2020-12-28 RX ORDER — CLINDAMYCIN HYDROCHLORIDE 150 MG/1
150 CAPSULE ORAL 4 TIMES DAILY
Qty: 4 CAPSULE | Refills: 0 | Status: SHIPPED | OUTPATIENT
Start: 2020-12-28 | End: 2020-12-29

## 2020-12-28 NOTE — PROGRESS NOTES
Spoke with pt. Via telephone. Pt. will arrive at the ER entrance at 0600 on 12/31/2020. Pt.informed that they may have no visitors come with them. They must be free of covid symptoms and must wear a mask upon entering the hospital.  If they do not have a mask, one will be given to them at the . The masks must be worn the whole time they are in the building. Covid-19 test was completed on 12/23/2020  and results are negative. Pt. Has been self-quarantining since their Covid-19 testing. Pt. Has had no cough, sore throat, fever, or any other unusual s/s that the physician should be made aware of before surgery. Pt. Had PAT done in [place]. He has no further questions and/or concerns at this time. SDS phone number was given should any further questions arise. [phone].

## 2020-12-31 ENCOUNTER — HOSPITAL ENCOUNTER (EMERGENCY)
Age: 55
Discharge: HOME | End: 2020-12-31
Attending: EMERGENCY MEDICINE
Payer: COMMERCIAL

## 2020-12-31 ENCOUNTER — APPOINTMENT (OUTPATIENT)
Dept: GENERAL RADIOLOGY | Age: 55
End: 2020-12-31

## 2020-12-31 ENCOUNTER — APPOINTMENT (OUTPATIENT)
Dept: CT IMAGING | Age: 55
End: 2020-12-31

## 2020-12-31 ENCOUNTER — ANESTHESIA (OUTPATIENT)
Dept: OPERATING ROOM | Age: 55
End: 2020-12-31
Payer: COMMERCIAL

## 2020-12-31 ENCOUNTER — HOSPITAL ENCOUNTER (OUTPATIENT)
Age: 55
Setting detail: OUTPATIENT SURGERY
Discharge: HOME | End: 2020-12-31
Attending: ORTHOPAEDIC SURGERY | Admitting: ORTHOPAEDIC SURGERY
Payer: COMMERCIAL

## 2020-12-31 VITALS
OXYGEN SATURATION: 94 % | DIASTOLIC BLOOD PRESSURE: 103 MMHG | TEMPERATURE: 97.6 F | BODY MASS INDEX: 33.91 KG/M2 | HEIGHT: 66 IN | RESPIRATION RATE: 18 BRPM | WEIGHT: 211 LBS | SYSTOLIC BLOOD PRESSURE: 134 MMHG | HEART RATE: 82 BPM

## 2020-12-31 VITALS
SYSTOLIC BLOOD PRESSURE: 141 MMHG | DIASTOLIC BLOOD PRESSURE: 115 MMHG | RESPIRATION RATE: 13 BRPM | OXYGEN SATURATION: 97 % | TEMPERATURE: 97.7 F

## 2020-12-31 VITALS
OXYGEN SATURATION: 96 % | BODY MASS INDEX: 34.06 KG/M2 | SYSTOLIC BLOOD PRESSURE: 128 MMHG | WEIGHT: 211 LBS | DIASTOLIC BLOOD PRESSURE: 93 MMHG | TEMPERATURE: 98.2 F | HEART RATE: 97 BPM | RESPIRATION RATE: 11 BRPM

## 2020-12-31 DIAGNOSIS — R06.00 DYSPNEA AND RESPIRATORY ABNORMALITIES: Primary | ICD-10-CM

## 2020-12-31 DIAGNOSIS — J18.9 ATYPICAL PNEUMONIA: ICD-10-CM

## 2020-12-31 DIAGNOSIS — R06.89 DYSPNEA AND RESPIRATORY ABNORMALITIES: Primary | ICD-10-CM

## 2020-12-31 LAB
ANION GAP SERPL CALCULATED.3IONS-SCNC: 24 MMOL/L (ref 4–16)
BASOPHILS ABSOLUTE: 0 K/CU MM
BASOPHILS RELATIVE PERCENT: 0.1 % (ref 0–1)
BUN BLDV-MCNC: 8 MG/DL (ref 6–23)
CALCIUM SERPL-MCNC: 9.6 MG/DL (ref 8.3–10.6)
CHLORIDE BLD-SCNC: 105 MMOL/L (ref 99–110)
CO2: 12 MMOL/L (ref 21–32)
CREAT SERPL-MCNC: 0.9 MG/DL (ref 0.6–1.1)
D DIMER: 412 NG/ML(DDU)
DIFFERENTIAL TYPE: ABNORMAL
EOSINOPHILS ABSOLUTE: 0 K/CU MM
EOSINOPHILS RELATIVE PERCENT: 0 % (ref 0–3)
GFR AFRICAN AMERICAN: >60 ML/MIN/1.73M2
GFR NON-AFRICAN AMERICAN: >60 ML/MIN/1.73M2
GLUCOSE BLD-MCNC: 116 MG/DL (ref 70–99)
HCT VFR BLD CALC: 46.4 % (ref 37–47)
HEMOGLOBIN: 15.6 GM/DL (ref 12.5–16)
IMMATURE NEUTROPHIL %: 0.3 % (ref 0–0.43)
LYMPHOCYTES ABSOLUTE: 1.5 K/CU MM
LYMPHOCYTES RELATIVE PERCENT: 7.7 % (ref 24–44)
MCH RBC QN AUTO: 29.5 PG (ref 27–31)
MCHC RBC AUTO-ENTMCNC: 33.6 % (ref 32–36)
MCV RBC AUTO: 87.9 FL (ref 78–100)
MONOCYTES ABSOLUTE: 1 K/CU MM
MONOCYTES RELATIVE PERCENT: 5.4 % (ref 0–4)
PDW BLD-RTO: 12.5 % (ref 11.7–14.9)
PLATELET # BLD: 253 K/CU MM (ref 140–440)
PMV BLD AUTO: 10.5 FL (ref 7.5–11.1)
POTASSIUM SERPL-SCNC: 4.2 MMOL/L (ref 3.5–5.1)
RBC # BLD: 5.28 M/CU MM (ref 4.2–5.4)
SEGMENTED NEUTROPHILS ABSOLUTE COUNT: 16.5 K/CU MM
SEGMENTED NEUTROPHILS RELATIVE PERCENT: 86.5 % (ref 36–66)
SODIUM BLD-SCNC: 141 MMOL/L (ref 135–145)
TOTAL IMMATURE NEUTOROPHIL: 0.05 K/CU MM
TROPONIN T: <0.01 NG/ML
WBC # BLD: 19.1 K/CU MM (ref 4–10.5)

## 2020-12-31 PROCEDURE — 80048 BASIC METABOLIC PNL TOTAL CA: CPT

## 2020-12-31 PROCEDURE — 2580000003 HC RX 258: Performed by: ORTHOPAEDIC SURGERY

## 2020-12-31 PROCEDURE — 71045 X-RAY EXAM CHEST 1 VIEW: CPT

## 2020-12-31 PROCEDURE — 29824 SHO ARTHRS SRG DSTL CLAVICLC: CPT | Performed by: ORTHOPAEDIC SURGERY

## 2020-12-31 PROCEDURE — 6360000002 HC RX W HCPCS: Performed by: NURSE ANESTHETIST, CERTIFIED REGISTERED

## 2020-12-31 PROCEDURE — 6370000000 HC RX 637 (ALT 250 FOR IP): Performed by: ORTHOPAEDIC SURGERY

## 2020-12-31 PROCEDURE — 29827 SHO ARTHRS SRG RT8TR CUF RPR: CPT | Performed by: ORTHOPAEDIC SURGERY

## 2020-12-31 PROCEDURE — 93005 ELECTROCARDIOGRAM TRACING: CPT | Performed by: NURSE ANESTHETIST, CERTIFIED REGISTERED

## 2020-12-31 PROCEDURE — 29823 SHO ARTHRS SRG XTNSV DBRDMT: CPT | Performed by: ORTHOPAEDIC SURGERY

## 2020-12-31 PROCEDURE — 2500000003 HC RX 250 WO HCPCS: Performed by: NURSE ANESTHETIST, CERTIFIED REGISTERED

## 2020-12-31 PROCEDURE — 3700000000 HC ANESTHESIA ATTENDED CARE: Performed by: ORTHOPAEDIC SURGERY

## 2020-12-31 PROCEDURE — 93010 ELECTROCARDIOGRAM REPORT: CPT | Performed by: INTERNAL MEDICINE

## 2020-12-31 PROCEDURE — 99284 EMERGENCY DEPT VISIT MOD MDM: CPT

## 2020-12-31 PROCEDURE — 2720000010 HC SURG SUPPLY STERILE: Performed by: ORTHOPAEDIC SURGERY

## 2020-12-31 PROCEDURE — 7100000011 HC PHASE II RECOVERY - ADDTL 15 MIN: Performed by: ORTHOPAEDIC SURGERY

## 2020-12-31 PROCEDURE — 6360000002 HC RX W HCPCS: Performed by: ORTHOPAEDIC SURGERY

## 2020-12-31 PROCEDURE — 7100000001 HC PACU RECOVERY - ADDTL 15 MIN: Performed by: ORTHOPAEDIC SURGERY

## 2020-12-31 PROCEDURE — 3600000004 HC SURGERY LEVEL 4 BASE: Performed by: ORTHOPAEDIC SURGERY

## 2020-12-31 PROCEDURE — 6360000002 HC RX W HCPCS: Performed by: EMERGENCY MEDICINE

## 2020-12-31 PROCEDURE — 6370000000 HC RX 637 (ALT 250 FOR IP): Performed by: EMERGENCY MEDICINE

## 2020-12-31 PROCEDURE — 7100000010 HC PHASE II RECOVERY - FIRST 15 MIN: Performed by: ORTHOPAEDIC SURGERY

## 2020-12-31 PROCEDURE — 2709999900 HC NON-CHARGEABLE SUPPLY: Performed by: ORTHOPAEDIC SURGERY

## 2020-12-31 PROCEDURE — 2500000003 HC RX 250 WO HCPCS: Performed by: ORTHOPAEDIC SURGERY

## 2020-12-31 PROCEDURE — 76942 ECHO GUIDE FOR BIOPSY: CPT | Performed by: NURSE ANESTHETIST, CERTIFIED REGISTERED

## 2020-12-31 PROCEDURE — 84484 ASSAY OF TROPONIN QUANT: CPT

## 2020-12-31 PROCEDURE — 96375 TX/PRO/DX INJ NEW DRUG ADDON: CPT

## 2020-12-31 PROCEDURE — 3700000001 HC ADD 15 MINUTES (ANESTHESIA): Performed by: ORTHOPAEDIC SURGERY

## 2020-12-31 PROCEDURE — 7100000000 HC PACU RECOVERY - FIRST 15 MIN: Performed by: ORTHOPAEDIC SURGERY

## 2020-12-31 PROCEDURE — C1713 ANCHOR/SCREW BN/BN,TIS/BN: HCPCS | Performed by: ORTHOPAEDIC SURGERY

## 2020-12-31 PROCEDURE — 71275 CT ANGIOGRAPHY CHEST: CPT

## 2020-12-31 PROCEDURE — 93005 ELECTROCARDIOGRAM TRACING: CPT | Performed by: EMERGENCY MEDICINE

## 2020-12-31 PROCEDURE — 29826 SHO ARTHRS SRG DECOMPRESSION: CPT | Performed by: ORTHOPAEDIC SURGERY

## 2020-12-31 PROCEDURE — 6360000004 HC RX CONTRAST MEDICATION: Performed by: EMERGENCY MEDICINE

## 2020-12-31 PROCEDURE — 85025 COMPLETE CBC W/AUTO DIFF WBC: CPT

## 2020-12-31 PROCEDURE — 3600000014 HC SURGERY LEVEL 4 ADDTL 15MIN: Performed by: ORTHOPAEDIC SURGERY

## 2020-12-31 PROCEDURE — 96374 THER/PROPH/DIAG INJ IV PUSH: CPT

## 2020-12-31 PROCEDURE — 85379 FIBRIN DEGRADATION QUANT: CPT

## 2020-12-31 DEVICE — SPEEDSCREW 5.5 MM IMPLANT
Type: IMPLANTABLE DEVICE | Site: SHOULDER | Status: FUNCTIONAL
Brand: SPEEDSCREW

## 2020-12-31 RX ORDER — FENTANYL CITRATE 50 UG/ML
50 INJECTION, SOLUTION INTRAMUSCULAR; INTRAVENOUS EVERY 5 MIN PRN
Status: DISCONTINUED | OUTPATIENT
Start: 2020-12-31 | End: 2020-12-31 | Stop reason: HOSPADM

## 2020-12-31 RX ORDER — ACETAMINOPHEN 500 MG
1000 TABLET ORAL ONCE
Status: COMPLETED | OUTPATIENT
Start: 2020-12-31 | End: 2020-12-31

## 2020-12-31 RX ORDER — IPRATROPIUM BROMIDE AND ALBUTEROL SULFATE 2.5; .5 MG/3ML; MG/3ML
1 SOLUTION RESPIRATORY (INHALATION) ONCE
Status: COMPLETED | OUTPATIENT
Start: 2020-12-31 | End: 2020-12-31

## 2020-12-31 RX ORDER — DEXAMETHASONE SODIUM PHOSPHATE 4 MG/ML
INJECTION, SOLUTION INTRA-ARTICULAR; INTRALESIONAL; INTRAMUSCULAR; INTRAVENOUS; SOFT TISSUE PRN
Status: DISCONTINUED | OUTPATIENT
Start: 2020-12-31 | End: 2020-12-31 | Stop reason: SDUPTHER

## 2020-12-31 RX ORDER — ROPIVACAINE HYDROCHLORIDE 5 MG/ML
INJECTION, SOLUTION EPIDURAL; INFILTRATION; PERINEURAL
Status: COMPLETED | OUTPATIENT
Start: 2020-12-31 | End: 2020-12-31

## 2020-12-31 RX ORDER — OXYCODONE HYDROCHLORIDE 5 MG/1
5 TABLET ORAL EVERY 4 HOURS PRN
Status: DISCONTINUED | OUTPATIENT
Start: 2020-12-31 | End: 2020-12-31 | Stop reason: HOSPADM

## 2020-12-31 RX ORDER — ONDANSETRON 2 MG/ML
4 INJECTION INTRAMUSCULAR; INTRAVENOUS ONCE
Status: COMPLETED | OUTPATIENT
Start: 2020-12-31 | End: 2020-12-31

## 2020-12-31 RX ORDER — MORPHINE SULFATE 4 MG/ML
4 INJECTION, SOLUTION INTRAMUSCULAR; INTRAVENOUS ONCE
Status: COMPLETED | OUTPATIENT
Start: 2020-12-31 | End: 2020-12-31

## 2020-12-31 RX ORDER — SODIUM CHLORIDE 0.9 % (FLUSH) 0.9 %
10 SYRINGE (ML) INJECTION EVERY 12 HOURS SCHEDULED
Status: DISCONTINUED | OUTPATIENT
Start: 2020-12-31 | End: 2020-12-31 | Stop reason: HOSPADM

## 2020-12-31 RX ORDER — FENTANYL CITRATE 50 UG/ML
INJECTION, SOLUTION INTRAMUSCULAR; INTRAVENOUS PRN
Status: DISCONTINUED | OUTPATIENT
Start: 2020-12-31 | End: 2020-12-31 | Stop reason: SDUPTHER

## 2020-12-31 RX ORDER — AZITHROMYCIN 250 MG/1
250 TABLET, FILM COATED ORAL SEE ADMIN INSTRUCTIONS
Qty: 6 TABLET | Refills: 0 | Status: SHIPPED | OUTPATIENT
Start: 2020-12-31 | End: 2020-12-31 | Stop reason: SDUPTHER

## 2020-12-31 RX ORDER — AZITHROMYCIN 250 MG/1
500 TABLET, FILM COATED ORAL ONCE
Status: COMPLETED | OUTPATIENT
Start: 2020-12-31 | End: 2020-12-31

## 2020-12-31 RX ORDER — SODIUM CHLORIDE 0.9 % (FLUSH) 0.9 %
10 SYRINGE (ML) INJECTION PRN
Status: DISCONTINUED | OUTPATIENT
Start: 2020-12-31 | End: 2020-12-31 | Stop reason: HOSPADM

## 2020-12-31 RX ORDER — FENTANYL CITRATE 50 UG/ML
25 INJECTION, SOLUTION INTRAMUSCULAR; INTRAVENOUS EVERY 5 MIN PRN
Status: DISCONTINUED | OUTPATIENT
Start: 2020-12-31 | End: 2020-12-31 | Stop reason: HOSPADM

## 2020-12-31 RX ORDER — OXYCODONE HYDROCHLORIDE 10 MG/1
10 TABLET ORAL EVERY 4 HOURS PRN
Status: DISCONTINUED | OUTPATIENT
Start: 2020-12-31 | End: 2020-12-31 | Stop reason: HOSPADM

## 2020-12-31 RX ORDER — MIDAZOLAM HYDROCHLORIDE 1 MG/ML
INJECTION INTRAMUSCULAR; INTRAVENOUS PRN
Status: DISCONTINUED | OUTPATIENT
Start: 2020-12-31 | End: 2020-12-31 | Stop reason: SDUPTHER

## 2020-12-31 RX ORDER — ROCURONIUM BROMIDE 10 MG/ML
INJECTION, SOLUTION INTRAVENOUS PRN
Status: DISCONTINUED | OUTPATIENT
Start: 2020-12-31 | End: 2020-12-31 | Stop reason: SDUPTHER

## 2020-12-31 RX ORDER — LIDOCAINE HYDROCHLORIDE 20 MG/ML
INJECTION, SOLUTION INTRAVENOUS PRN
Status: DISCONTINUED | OUTPATIENT
Start: 2020-12-31 | End: 2020-12-31 | Stop reason: SDUPTHER

## 2020-12-31 RX ORDER — PROPOFOL 10 MG/ML
INJECTION, EMULSION INTRAVENOUS PRN
Status: DISCONTINUED | OUTPATIENT
Start: 2020-12-31 | End: 2020-12-31 | Stop reason: SDUPTHER

## 2020-12-31 RX ORDER — ONDANSETRON 2 MG/ML
INJECTION INTRAMUSCULAR; INTRAVENOUS PRN
Status: DISCONTINUED | OUTPATIENT
Start: 2020-12-31 | End: 2020-12-31 | Stop reason: SDUPTHER

## 2020-12-31 RX ORDER — AZITHROMYCIN 250 MG/1
250 TABLET, FILM COATED ORAL SEE ADMIN INSTRUCTIONS
Qty: 6 TABLET | Refills: 0 | Status: SHIPPED | OUTPATIENT
Start: 2020-12-31 | End: 2021-01-05

## 2020-12-31 RX ORDER — SODIUM CHLORIDE, SODIUM LACTATE, POTASSIUM CHLORIDE, CALCIUM CHLORIDE 600; 310; 30; 20 MG/100ML; MG/100ML; MG/100ML; MG/100ML
INJECTION, SOLUTION INTRAVENOUS CONTINUOUS
Status: DISCONTINUED | OUTPATIENT
Start: 2020-12-31 | End: 2020-12-31 | Stop reason: HOSPADM

## 2020-12-31 RX ORDER — CLINDAMYCIN PHOSPHATE 900 MG/50ML
900 INJECTION INTRAVENOUS
Status: COMPLETED | OUTPATIENT
Start: 2020-12-31 | End: 2020-12-31

## 2020-12-31 RX ORDER — SODIUM CHLORIDE, SODIUM LACTATE, POTASSIUM CHLORIDE, CALCIUM CHLORIDE 600; 310; 30; 20 MG/100ML; MG/100ML; MG/100ML; MG/100ML
500 INJECTION, SOLUTION INTRAVENOUS CONTINUOUS
Status: DISCONTINUED | OUTPATIENT
Start: 2020-12-31 | End: 2020-12-31 | Stop reason: HOSPADM

## 2020-12-31 RX ORDER — KETAMINE HYDROCHLORIDE 10 MG/ML
INJECTION, SOLUTION INTRAMUSCULAR; INTRAVENOUS PRN
Status: DISCONTINUED | OUTPATIENT
Start: 2020-12-31 | End: 2020-12-31 | Stop reason: SDUPTHER

## 2020-12-31 RX ADMIN — DEXAMETHASONE SODIUM PHOSPHATE 8 MG: 4 INJECTION, SOLUTION INTRAMUSCULAR; INTRAVENOUS at 09:17

## 2020-12-31 RX ADMIN — IOPAMIDOL 100 ML: 755 INJECTION, SOLUTION INTRAVENOUS at 21:51

## 2020-12-31 RX ADMIN — ONDANSETRON 4 MG: 2 INJECTION INTRAMUSCULAR; INTRAVENOUS at 20:16

## 2020-12-31 RX ADMIN — MORPHINE SULFATE 4 MG: 4 INJECTION, SOLUTION INTRAMUSCULAR; INTRAVENOUS at 20:19

## 2020-12-31 RX ADMIN — FENTANYL CITRATE 100 MCG: 50 INJECTION INTRAMUSCULAR; INTRAVENOUS at 08:00

## 2020-12-31 RX ADMIN — IPRATROPIUM BROMIDE AND ALBUTEROL SULFATE 1 AMPULE: .5; 3 SOLUTION RESPIRATORY (INHALATION) at 10:00

## 2020-12-31 RX ADMIN — SODIUM CHLORIDE, POTASSIUM CHLORIDE, SODIUM LACTATE AND CALCIUM CHLORIDE: 600; 310; 30; 20 INJECTION, SOLUTION INTRAVENOUS at 06:38

## 2020-12-31 RX ADMIN — AZITHROMYCIN MONOHYDRATE 500 MG: 250 TABLET ORAL at 23:16

## 2020-12-31 RX ADMIN — FENTANYL CITRATE 50 MCG: 50 INJECTION, SOLUTION INTRAMUSCULAR; INTRAVENOUS at 10:32

## 2020-12-31 RX ADMIN — MIDAZOLAM 2 MG: 1 INJECTION INTRAMUSCULAR; INTRAVENOUS at 07:45

## 2020-12-31 RX ADMIN — KETAMINE HYDROCHLORIDE 20 MG: 10 INJECTION INTRAMUSCULAR; INTRAVENOUS at 08:05

## 2020-12-31 RX ADMIN — CLINDAMYCIN IN 5 PERCENT DEXTROSE 900 MG: 18 INJECTION, SOLUTION INTRAVENOUS at 07:08

## 2020-12-31 RX ADMIN — SUGAMMADEX 100 MG: 100 INJECTION, SOLUTION INTRAVENOUS at 09:17

## 2020-12-31 RX ADMIN — LIDOCAINE HYDROCHLORIDE 50 MG: 20 INJECTION, SOLUTION INTRAVENOUS at 08:00

## 2020-12-31 RX ADMIN — ACETAMINOPHEN 1000 MG: 500 TABLET ORAL at 06:41

## 2020-12-31 RX ADMIN — PROPOFOL 150 MG: 10 INJECTION, EMULSION INTRAVENOUS at 08:00

## 2020-12-31 RX ADMIN — ONDANSETRON HYDROCHLORIDE 4 MG: 4 INJECTION, SOLUTION INTRAMUSCULAR; INTRAVENOUS at 09:17

## 2020-12-31 RX ADMIN — SODIUM CHLORIDE, POTASSIUM CHLORIDE, SODIUM LACTATE AND CALCIUM CHLORIDE: 600; 310; 30; 20 INJECTION, SOLUTION INTRAVENOUS at 07:44

## 2020-12-31 RX ADMIN — ROCURONIUM BROMIDE 50 MG: 10 SOLUTION INTRAVENOUS at 08:00

## 2020-12-31 RX ADMIN — SODIUM CHLORIDE, PRESERVATIVE FREE 10 ML: 5 INJECTION INTRAVENOUS at 06:38

## 2020-12-31 RX ADMIN — ROPIVACAINE HYDROCHLORIDE 20 ML: 5 INJECTION, SOLUTION EPIDURAL; INFILTRATION; PERINEURAL at 09:36

## 2020-12-31 ASSESSMENT — PULMONARY FUNCTION TESTS
PIF_VALUE: 23
PIF_VALUE: 19
PIF_VALUE: 18
PIF_VALUE: 13
PIF_VALUE: 18
PIF_VALUE: 21
PIF_VALUE: 22
PIF_VALUE: 22
PIF_VALUE: 19
PIF_VALUE: 22
PIF_VALUE: 19
PIF_VALUE: 22
PIF_VALUE: 18
PIF_VALUE: 18
PIF_VALUE: 19
PIF_VALUE: 18
PIF_VALUE: 23
PIF_VALUE: 1
PIF_VALUE: 18
PIF_VALUE: 1
PIF_VALUE: 1
PIF_VALUE: 18
PIF_VALUE: 18
PIF_VALUE: 21
PIF_VALUE: 18
PIF_VALUE: 22
PIF_VALUE: 23
PIF_VALUE: 2
PIF_VALUE: 18
PIF_VALUE: 19
PIF_VALUE: 22
PIF_VALUE: 18
PIF_VALUE: 20
PIF_VALUE: 18
PIF_VALUE: 19
PIF_VALUE: 36
PIF_VALUE: 18
PIF_VALUE: 22
PIF_VALUE: 19
PIF_VALUE: 22
PIF_VALUE: 19
PIF_VALUE: 19
PIF_VALUE: 37
PIF_VALUE: 19
PIF_VALUE: 4
PIF_VALUE: 18
PIF_VALUE: 8
PIF_VALUE: 22
PIF_VALUE: 18
PIF_VALUE: 22
PIF_VALUE: 2
PIF_VALUE: 19
PIF_VALUE: 23
PIF_VALUE: 18
PIF_VALUE: 23
PIF_VALUE: 18
PIF_VALUE: 9
PIF_VALUE: 22
PIF_VALUE: 19
PIF_VALUE: 19
PIF_VALUE: 2
PIF_VALUE: 18
PIF_VALUE: 18
PIF_VALUE: 23
PIF_VALUE: 23
PIF_VALUE: 14
PIF_VALUE: 19
PIF_VALUE: 18
PIF_VALUE: 22
PIF_VALUE: 22
PIF_VALUE: 19
PIF_VALUE: 0
PIF_VALUE: 19
PIF_VALUE: 24
PIF_VALUE: 21
PIF_VALUE: 18
PIF_VALUE: 7
PIF_VALUE: 22
PIF_VALUE: 18
PIF_VALUE: 21
PIF_VALUE: 19
PIF_VALUE: 18
PIF_VALUE: 19
PIF_VALUE: 22
PIF_VALUE: 19
PIF_VALUE: 18

## 2020-12-31 ASSESSMENT — PAIN SCALES - GENERAL
PAINLEVEL_OUTOF10: 3
PAINLEVEL_OUTOF10: 5
PAINLEVEL_OUTOF10: 10
PAINLEVEL_OUTOF10: 5
PAINLEVEL_OUTOF10: 6
PAINLEVEL_OUTOF10: 3
PAINLEVEL_OUTOF10: 0
PAINLEVEL_OUTOF10: 3

## 2020-12-31 ASSESSMENT — PAIN DESCRIPTION - DESCRIPTORS
DESCRIPTORS: ACHING
DESCRIPTORS: SHARP

## 2020-12-31 ASSESSMENT — PAIN DESCRIPTION - PAIN TYPE
TYPE: ACUTE PAIN
TYPE: ACUTE PAIN

## 2020-12-31 ASSESSMENT — PAIN DESCRIPTION - ORIENTATION
ORIENTATION: LEFT
ORIENTATION: LEFT

## 2020-12-31 ASSESSMENT — PAIN DESCRIPTION - LOCATION
LOCATION: BREAST
LOCATION: BREAST

## 2020-12-31 ASSESSMENT — PAIN - FUNCTIONAL ASSESSMENT: PAIN_FUNCTIONAL_ASSESSMENT: 0-10

## 2020-12-31 NOTE — ANESTHESIA PROCEDURE NOTES
Peripheral Block    Patient location during procedure: PACU  Start time: 12/31/2020 7:40 AM  End time: 12/31/2020 7:50 AM  Staffing  Performed: resident/CRNA   Resident/CRNA: HUMA Brady CRNA  Preanesthetic Checklist  Completed: patient identified, IV checked, site marked, risks and benefits discussed, surgical consent, monitors and equipment checked, pre-op evaluation, timeout performed, anesthesia consent given, oxygen available and patient being monitored  Peripheral Block  Patient position: supine  Prep: ChloraPrep  Patient monitoring: cardiac monitor, continuous pulse ox, continuous capnometry, frequent blood pressure checks and IV access  Block type: Brachial plexus  Laterality: right  Injection technique: single-shot  Guidance: ultrasound guided  Local infiltration: lidocaine  Infiltration strength: 1 %  Dose: 1 mL  Interscalene  Provider prep: mask and sterile gloves  Local infiltration: lidocaine  Needle  Needle type: combined needle/nerve stimulator   Needle gauge: 21 G  Needle length: 8 cm  Needle localization: ultrasound guidance  Assessment  Injection assessment: negative aspiration for heme, no paresthesia on injection and local visualized surrounding nerve on ultrasound  Paresthesia pain: none  Slow fractionated injection: yes  Hemodynamics: stable  Medications Administered  Ropivacaine (NAROPIN) injection 0.5%, 20 mL  Reason for block: procedure for pain, post-op pain management and at surgeon's request

## 2020-12-31 NOTE — H&P
Subjective:      Patient ID: Eriberto Villa is a 54 y.o. female.     Patient here for a [facility] injury new patient follow up per JASON Neves for evaluation of right RTC tear. She sustained the injury while lifting a 50lb water-meter at work back in August as dated below. She is wearing a sling as directed, taking Norco per PA Cranks and is currently off work. She reports no prior history to this shoulder.      DOI:8/18/2020     Provider needs to conduct a hands on physical today due to patients injury/diagnosis     MRI RIGHT SHOULDER 9/4/2020  Impression   1. Complete retracted full-thickness tear of supraspinatus with retraction of   the torn fibers measuring up to 2.4 cm. 2. Less than 50% partial-thickness articular surface and interstitial tearing   of anterior superior infraspinatus between musculotendinous junction and   footplate.  Moderate infraspinatus tendinopathy. 3. Mild subscapularis tendinopathy. 4. Mild atrophy and fatty degeneration of teres minor and infraspinatus. Findings involving teres minor can be seen in the setting of quadrilateral   space syndrome.  No obvious lesion identified in the quadrilateral space,   however. 5. Mild to moderate degenerative change of the right AC joint. 6. Moderate glenohumeral chondromalacia.  Mild diffuse labral degeneration. 7. Mild tendinosis of the intra-articular long head of the biceps tendon.          She comes in today for her first visit with me in regards to her right shoulder injury. She states that since the injury she is continued to have a sharp, stabbing pain globally in her right shoulder which will radiate into her shoulder blade and down her right arm. She also states that she has weakness when trying to lift her arm out to the side or in front of her.   Patient denies any prior injury to the involved extremity/ joint, denies numbness or tingling in the involved extremity and denies fever or chills.                 Review of Systems Constitutional: Negative for activity change, chills and fever. Respiratory: Negative for shortness of breath. Cardiovascular: Negative for chest pain. Musculoskeletal: Positive for arthralgias and myalgias. Negative for gait problem and joint swelling. Skin: Negative for color change, pallor, rash and wound. Neurological: Positive for weakness. Negative for numbness.         Past Medical History   Past Medical History:   Diagnosis Date    H/O echocardiogram 08/28/2018     EF 55-60%. No significant valvular disease.  History of cardiac monitoring 07/31/2018     Conclusion: 30 days event monitor suggesting sinus rhythm with symptomatic sinus tachycardia. So the symptoms are reported during normal rate and rhythm    Hyperlipidemia LDL goal <130 7/31/2018    Hypertension      Hypothyroidism      Tachycardia              No current facility-administered medications on file prior to encounter. Current Outpatient Medications on File Prior to Encounter   Medication Sig Dispense Refill    HYDROcodone-acetaminophen (NORCO) 5-325 MG per tablet Take 1 tablet by mouth every 8 hours as needed for Pain.        cyclobenzaprine (FLEXERIL) 10 MG tablet Take 10 mg by mouth nightly      ezetimibe (ZETIA) 10 MG tablet Take 1 tablet by mouth daily 90 tablet 1    levothyroxine (SYNTHROID) 100 MCG tablet Take 1 tablet by mouth Daily 90 tablet 1    lisinopril (PRINIVIL;ZESTRIL) 10 MG tablet Take 1 tablet by mouth daily 90 tablet 1     Allergies   Allergen Reactions    Keflex [Cephalexin]      Shut kidneys down    Meloxicam Shortness Of Breath    Penicillins Anaphylaxis    Statins Swelling     Throat swelling, vomiting    Cephalosporins Itching    Sulfa Antibiotics Itching    Tramadol Itching    Aspirin     Codeine Hives and Nausea And Vomiting    Naproxen Nausea And Vomiting     Dizzy lightheaded     Past Surgical History:   Procedure Laterality Date    CARDIAC CATHETERIZATION  2009    [place]NL per pt - ([place])   [address] Bilateral 2003    CHOLECYSTECTOMY  2013    ECTOPIC PREGNANCY SURGERY  1991    TUBAL LIGATION  1991     . sochy  Family History   Problem Relation Age of Onset   Jamie Power Cancer Mother         Thyroid    Diabetes Mother     Hypertension Mother    Jamie Power Stroke Mother     Thyroid Disease Mother     Kidney Disease Mother     Glaucoma Mother     Thyroid Cancer Mother     Cancer Father     Heart Disease Father     Lung Cancer Father     Obesity Brother     Thyroid Disease Brother     Kidney Disease Brother        Objective:   Physical Exam  Constitutional:       Appearance: She is well-developed. HENT:      Head: Normocephalic and atraumatic. Eyes:      Pupils: Pupils are equal, round, and reactive to light. Neck:      Musculoskeletal: Normal range of motion. Pulmonary:      Effort: Pulmonary effort is normal.   Musculoskeletal:         General: Tenderness present. No deformity. Right shoulder: She exhibits tenderness, crepitus, pain and decreased strength. She exhibits normal range of motion, no bony tenderness, no swelling, no effusion, no deformity, no laceration, no spasm and normal pulse. Left shoulder: She exhibits normal range of motion, no tenderness, no bony tenderness, no swelling, no effusion, no crepitus, no deformity, no laceration, no pain, no spasm, normal pulse and normal strength. Right elbow: Normal.     Left elbow: Normal.   Skin:     General: Skin is warm and dry. Coloration: Skin is not pale. Findings: No erythema or rash. Neurological:      Mental Status: She is alert and oriented to person, place, and time. Sensory: No sensory deficit. Right shoulder-Skin intact with no erythema, ecchymosis or lacerations present. Flexion 180  Abduction 180  External rotation 90  Internal rotation 90  Positive Conrad sign.   Strength 4/5 to resisted abduction.     MRI of the right shoulder from September 4, 2020 reviewed by me benefits, and reasonable alternatives including postponing the procedure were discussed. The patient does wish to proceed with the procedure at this time.       Pt seen and examined, No change in H+P.                  ROZINA ARREGUIN,

## 2020-12-31 NOTE — BRIEF OP NOTE
Brief Postoperative Note      Patient: Kiana Justice  YOB: 1965  MRN: [id number]    Date of Procedure: 12/31/2020    Pre-Op Diagnosis: TRAUMATIC COMPLETE TEAR OF RIGHT ROTATOR CUFF    Post-Op Diagnosis: Same       Procedure(s):  RIGHT SHOULDER ARTHROSCOPY, SUBACROMIAL DECOMPRESSION, DISTAL CLAVICLE RESECTION DEBRIDEMENT ROTATOR CUFF REPAIR    Surgeon(s):  Aisha Baca DO    Assistant:  * No surgical staff found *    Anesthesia: General    Estimated Blood Loss (mL): Minimal    Complications: None    Specimens:   * No specimens in log *    Implants:  Implant Name Type Inv.  Item Serial No.  Lot No. LRB No. Used Action   ANCHOR KNOTLESS W/ INSRTR HNDL SPEEDSCREW  ANCHOR KNOTLESS W/ INSRTR HNDL SPEEDSCREW  [employer] [id number] Right 2 Implanted         Drains: * No LDAs found *    Findings: Right shoulder rotator cuff tear    Electronically signed by Radha Carrillo DO on 12/31/2020 at 9:32 AM

## 2020-12-31 NOTE — PROGRESS NOTES
Pt. Is sitting up in chair. States pain is much better. Rates it at 3/10. Pt. Is belching/passing gas. Denies nausea. Drinking [employer]. Dressing to right shoulder is dry/intact. No drainage noted. Sling and ice pack in place. Call light in reach.

## 2020-12-31 NOTE — PROGRESS NOTES
Pt. Had a duoneb tx but c/o tightness and stabbing pain in left breast/chest area. 12 lead ordered. Will give pain medication per orders. Pt. Did cough felt some relief, but the pain returned. Will continue to monitor.

## 2020-12-31 NOTE — OP NOTE
DATE OF PROCEDURE: 12/31/2020    PREOPERATIVE DIAGNOSES:  1. Right shoulder rotator cuff tear. 2.  Right shoulder rotator cuff tendinitis and impingement. 3.  Right shoulder AC DJD    POSTOPERATIVE DIAGNOSES:  1. Right shoulder rotator cuff tear. 2.  Right shoulder rotator cuff tendinitis and impingement. 3.  Right shoulder AC DJD    OPERATIONS PERFORMED:  1. Diagnostic arthroscopy, right shoulder with rotator cuff repair  using 2 Speed Screw anchor with PerfectPass suture passer. 2.  Right shoulder arthroscopy with subacromial decompression. 3.  Right shoulder arthroscopy with distal clavicle resection. 4.  Right shoulder arthroscopy with extensive debridement. SURGEON:  Emma Flores DO    ANESTHESIA:  General with regional block. ESTIMATED BLOOD LOSS:  10 mL. FLUIDS:  700 mL of crystalloids. INDICATION FOR PROCEDURE:  The patient is a 54-year-old female with  longstanding history of right shoulder pain after sustaining an injury at work. For this she underwent  conservative treatment with no relief of her symptoms. MRI was  subsequently obtained, which showed evidence of a complete and retracted tear of the rotator cuff. Given her persistent symptoms despite conservative treatment and with her MRI findings, I recommend surgical treatment. I explained the risks, benefits, possible  complications of the procedure to the patient and after answering all of  her questions, she consented to undergo the above procedure. OPERATIVE PROCEDURE:  The patient was seen and evaluated in the  preoperative holding area where the right upper extremity was signed in  her presence. At this point, the patient was turned over to the  Anesthesia Team, who performed a regional block to the right upper  extremity. She was then transported back to the operative suite.    General anesthesia was applied and once adequate anesthesia was  obtained, she was placed in the lateral decubitus position with 15  pounds of traction on the right upper extremity. The right upper  extremity was then prepped and draped in the usual sterile fashion. Preoperative antibiotics were administered. At this point, a time-out  was performed and all in attendance were in agreement. I made a standard posterior and lateral  portal incisions and I inserted arthroscopic instrumentation into the  glenohumeral joint through the posterior portal.  I then advanced the  camera beneath the biceps tendon identifying the rotator interval.  I  then removed the camera and advanced the suture stick to identify the  anterior portal location. I then made an incision and inserted the  anterior cannula. I then reinserted the camera into the posterior  portal.  At this point, I performed a standard diagnostic arthroscopy  evaluating the glenohumeral joint. I found the cartilage on the humeral head and glenoid to be virtually intact. I also found there to be extensive degenerative fraying of the labrum circumferentially around the glenoid as well as diffuse synovitis within the glenohumeral joint. The biceps tendon was probed and found to be intact. I then used the ArthroCare wand through the anterior portal to perform an extensive debridement debriding the labrum  circumferentially around the glenoid back to solid labral tissue  circumferentially. I also debrided the synovitis back to solid tissue around the capsule in the right shoulder. I then evaluated the undersurface of the rotator  cuff and did find there to be a moderate sized full-thickness tear through the  supraspinatus tendon insertion to the humeral head. I then debrided the  undersurface of the rotator cuff tear with the use of the ArthroCare  wand. I then removed the anterior cannula and removed the posterior  cannula from the glenohumeral joint. I then reinserted the posterior cannula into the subacromial space.    Within the subacromial space, I found there to be a moderate amount of  bursal tissue present. I then used the arthroscopic shaver through the  lateral portal to remove all the bursal tissue in its entirety. I then  used the ArthroCare wand to complete the debridement of the bursa as  well as all soft tissue off the undersurface of the acromion. Once I  completely visualized the acromion, I did find there to be a moderate sized bone  spur present on the anterolateral edge of the acromion as well as another large bone spur on the acromion at the [facility] joint. I then used the  5.0 mm barrel fawad through the lateral portal to perform a subacromial  decompression removing the prominent bone spur and carrying the  decompression from the anterior to posterior direction and from lateral  to medial direction. Once this was completed, I had significantly  opened up the space of the subacromial area. We then turned our attention to the [facility] joint. I inserted the ArthroCare  wand through the anterior portal and removed all soft tissue off the  distal clavicle. I did plan there to be virtually no space between the  distal clavicle and the acromion. I then used the fawad through the  anterior portal to perform a distal clavicle resection removing  approximately 0.75 cm of the distal clavicle circumferentially. Once  this was complete, I was able to easily pass the fawad into the [facility] joint. I then turned my attention to the rotator cuff tear. I visualized the  supraspinatus tendon and found there to be a small crescent-shaped tear  through the central edge of the supraspinatus which was also delaminated with transverse stranding of the deeper portion of the supraspinatus tendon. I first used the arthroscopic shaver and ArthroCare wand to complete the removal of the deep portion of the supraspinatus tendon off of the humeral head. I then used the shaver and the ArthroCare wand to debride all frayed edges off of the crescent shaped tear of the supraspinatus tendon.   I then used the fawad  to debride the cortical bone off the area. I then used the 2 PerfectPass  suture passer and grasped an excellent bite of tissue at the anterior edge of the tear as well as one along the posterior edge of the tear. I then used a grasper to pull the posterior sutures out of the anterior portal.  I then  inserted the tap for the anterior anchor, which was maletted into place at the anterior edge of the tear. I then  inserted the Tripshare and ARAMobileHandshake Screw, which was inserted until it is  completely seated. I then loaded up the sutures into the insertion  device. The sutures were then tensioned appropriately advancing the  tendon onto the anchor. The suture was locked in place and  excess suture was then cut. I then grasped hold of the posterior sutures and pulled them back through the lateral portal.  I then inserted the tap for the second posterior anchor at the posterior edge of the tear. I then inserted the second [address] speed screw which was inserted until it was completely seated. I then loaded up the sutures onto the insertion device. The sutures were then tensioned appropriately advancing the tendon onto the anchor. The suture was then locked in place and excess suture was then cut. With the repair complete, I was able to  rotate the shoulder in internal and external rotation with excellent  stability throughout range of motion. Arthroscopic instrumentation was  then removed from the shoulder. Excess fluid was then drained from the  shoulder. Skin incisions were then closed using 3-0 nylon suture. Adequate hemostasis was maintained at all times. I then  applied a  sterile soft dressing to the right upper extremity followed by an  abduction pillow and sling. The patient was then awakened from the  anesthesia and transported to PACU in stable condition. She appeared to  have tolerated the procedure well.     PROGNOSIS:  At this point, she will be discharged to home with a short  course of oral antibiotics as well as pain medication. She is to  maintain her sling and pillow at all times and is to have no lifting,  pushing, or pulling with the right arm. I will see her back in the  office in two weeks for suture removal and continue to monitor her  progress in the outpatient setting for resolution of her symptoms.         Sachin Sevilla, DO

## 2020-12-31 NOTE — PROGRESS NOTES
Pt. Sitting up in bed. Rates pain at 5/10 now. EKG was WNLs. dressing to right shoulder is dry/intact. Pt. Belching. Sling and ice pack in place. No drainage noted. IV infusing without difficulty. SCDs to BLEs. Will transport pt. Back to \[facility] [place] [facility]\"" at this time. Belen Chaudhary

## 2020-12-31 NOTE — PROGRESS NOTES
Pt. Using incentive spirometer. Pain is 3/10 and pt. Is satisfied. No nausea. Tolerated two [address]. Dressing is dry/intact to right shoulder. IV was removed. D/C instructions given to boyfriend and pt. Discharge packet given along with 2 scripts. Pt. Waiting on ride.

## 2020-12-31 NOTE — ANESTHESIA POSTPROCEDURE EVALUATION
Department of Anesthesiology  Postprocedure Note    Patient: Alissa Ng  MRN: [id number]  YOB: 1965  Date of evaluation: 12/31/2020  Time:  11:52 AM     Procedure Summary     Date: 12/31/20 Room / Location: [address]    Anesthesia Start: 7306 Anesthesia Stop: 7126    Procedure: RIGHT SHOULDER ARTHROSCOPY, SUBACROMIAL DECOMPRESSION, DISTAL CLAVICLE RESECTION DEBRIDEMENT ROTATOR CUFF REPAIR (Right Shoulder) Diagnosis: (TRAUMATIC COMPLETE TEAR OF RIGHT ROTATOR CUFF)    Surgeons: Abbi Palacios DO Responsible Provider: HUMA Art CRNA    Anesthesia Type: general, regional ASA Status: 3          Anesthesia Type: general, regional    Lotus Phase I: Lotus Score: 9    Lotus Phase II: Lotus Score: 10    Last vitals: Reviewed and per EMR flowsheets.        Anesthesia Post Evaluation    Patient location during evaluation: PACU  Patient participation: complete - patient participated  Level of consciousness: awake and alert  Pain score: 2  Airway patency: patent  Nausea & Vomiting: no nausea and no vomiting  Complications: no  Cardiovascular status: blood pressure returned to baseline  Respiratory status: acceptable  Hydration status: euvolemic

## 2020-12-31 NOTE — PROGRESS NOTES
Pt. Arrived in PACU via cart from the OR. Oxygen applied via simple mask at 4L.  attached to monitor, vs stable. Brakes applied, side rails up x 2. Dressing to right shoulder is dry/intact. No drainage noted. Ice pack placed. Sling in place. IV infusing without difficulty. SCDs to BLEs. Bedside report received from Roxanna Keller RN and Madai Olivas CRNA. Will continue to monitor via bedside.

## 2021-01-01 ASSESSMENT — ENCOUNTER SYMPTOMS
SHORTNESS OF BREATH: 1
GASTROINTESTINAL NEGATIVE: 1
EYES NEGATIVE: 1

## 2021-01-01 NOTE — ED NOTES
Pt states she had Rt shoulder surgery today and this evening feels SOB with exertion and has pain behind Lt breast with deep breath.       Jad King RN  12/31/20 4980

## 2021-01-01 NOTE — ED NOTES
Resting quietly with Rt arm resting on pillows while in sling. Still c/o HA but pain behind the Lt breast is now 5/10.        Riddhi Billings RN  12/31/20 4974

## 2021-01-01 NOTE — ED NOTES
Enc to use her incentive spirometry frequently, use humidifier, cough out phlegm, decrease or stop smoking, get up and move every hour, take her ATB  and drink lots of clear fluids. Advised to return if worse. Verbalized understanding.       Sukhdeep Vazquez RN  12/31/20 2357

## 2021-01-01 NOTE — ED NOTES
Pt wheeled back to room and states needing to use the bathroom first.       Brett Lea RN  12/31/20 1943

## 2021-01-01 NOTE — ED PROVIDER NOTES
omari  The history is provided by the patient. Shortness of Breath  The patient is status post rotator cuff repair which was performed today. The patient states that she has had a negative Covid test but she has had a mild cough with no fever. After the surgery she did have some shortness of breath and some chest pain the anesthesiologist believe that the chest pain was secondary to her laying on her left side where the pain is located because she had to be placed on this side with her hand up above her head for the duration of the surgery and the repair. The patient states that she is having dyspnea on exertion and a cough which is primarily productive of a whitish-yellow sputum. The patient shortness of breath is not relieved by anything besides resting it is worsened by exertion she has not done any ineffective treatments. The patient's chest pain is actually more related to soft tissue. The patient does have a cough with whitish sputum. She has had no fever diaphoresis. Patient denies sore throat. Patient is also not been having any wheezing. Because the patient was having this shortness of breath and cough she was told to come to the emergency department for evaluation by her orthopedic surgeon. Review of Systems   Constitutional: Negative. HENT: Negative. Eyes: Negative. Respiratory: Positive for shortness of breath. Cardiovascular: Negative. Gastrointestinal: Negative. Genitourinary: Negative. Musculoskeletal: Negative. Skin: Negative. Neurological: Negative. All other systems reviewed and are negative.       Family History   Problem Relation Age of Onset   Lubna Mccall Cancer Mother         Thyroid    Diabetes Mother     Hypertension Mother    Lubna Mccall Stroke Mother     Thyroid Disease Mother     Kidney Disease Mother     Glaucoma Mother     Thyroid Cancer Mother     Cancer Father     Heart Disease Father     Lung Cancer Father     Obesity Brother     Thyroid Disease Brother     Kidney Disease Brother      Social History     Socioeconomic History    Marital status: Single     Spouse name: Not on file    Number of children: Not on file    Years of education: Not on file    Highest education level: Not on file   Occupational History    Not on file   Social Needs    Financial resource strain: Not on file    Food insecurity     Worry: Not on file     Inability: Not on file    Transportation needs     Medical: Not on file     Non-medical: Not on file   Tobacco Use    Smoking status: Current Every Day Smoker     Packs/day: 0.50     Years: 33.00     Pack years: 16.50     Types: Cigarettes     Start date: 1977    Smokeless tobacco: Never Used   Substance and Sexual Activity    Alcohol use: No    Drug use: Not Currently     Types: Methamphetamines, Marijuana     Comment: Not used since 1997    Sexual activity: Yes     Partners: Male   Lifestyle    Physical activity     Days per week: Not on file     Minutes per session: Not on file    Stress: Not on file   Relationships    Social connections     Talks on phone: Not on file     Gets together: Not on file     Attends Voodoo service: Not on file     Active member of club or organization: Not on file     Attends meetings of clubs or organizations: Not on file     Relationship status: Not on file    Intimate partner violence     Fear of current or ex partner: Not on file     Emotionally abused: Not on file     Physically abused: Not on file     Forced sexual activity: Not on file   Other Topics Concern    Not on file   Social History Narrative    Not on file     Past Surgical History:   Procedure Laterality Date    CARDIAC CATHETERIZATION  2009    [place] - [place]   [address] Bilateral 2003    CHOLECYSTECTOMY  2013   [address] Right 12/31/2020    Diagnostic arthroscopy, right shoulder with rotator cuff repair.   2. subacromial decompression 3. distal clavicle resection 4. extensive debridement    TUBAL LIGATION  1991     Past Medical History:   Diagnosis Date    COPD (chronic obstructive pulmonary disease) ([facility] [address]     Denies COPD, uses inhaler for SOB - prn - hx: smoking    H/O echocardiogram 08/28/2018    EF 55-60%. No significant valvular disease.  Hernia, umbilical     History of cardiac monitoring 07/31/2018    Conclusion: 30 days event monitor suggesting sinus rhythm with symptomatic sinus tachycardia. So the symptoms are reported during normal rate and rhythm    Hyperlipidemia LDL goal <130 7/31/2018    Hypertension     Follows with PCP    Hypothyroidism     Prolonged emergence from general anesthesia     Tachycardia     Wears partial dentures      Allergies   Allergen Reactions    Keflex [Cephalexin]      Shut kidneys down    Meloxicam Shortness Of Breath    Penicillins Anaphylaxis    Statins Swelling     Throat swelling, vomiting    Cephalosporins Itching    Sulfa Antibiotics Itching    Tramadol Itching    Aspirin     Codeine Hives and Nausea And Vomiting    Naproxen Nausea And Vomiting     Dizzy lightheaded     Prior to Admission medications    Medication Sig Start Date End Date Taking? Authorizing Provider   azithromycin (ZITHROMAX) 250 MG tablet Take 1 tablet by mouth See Admin Instructions for 5 days 500mg on day 1 followed by 250mg on days 2 - 5 12/31/20 1/5/21 Yes Keiry Woods,    oxyCODONE-acetaminophen (PERCOCET) 5-325 MG per tablet Take 1 tablet by mouth every 6 hours as needed for Pain for up to 7 days. Intended supply: 7 days. Take lowest dose possible to manage pain 12/28/20 1/4/21  Francesca Avalos,    albuterol sulfate HFA (VENTOLIN HFA) 108 (90 Base) MCG/ACT inhaler Inhale 2 puffs into the lungs every 6 hours as needed for Wheezing    Historical Provider, MD   HYDROcodone-acetaminophen (NORCO) 5-325 MG per tablet Take 1 tablet by mouth every 8 hours as needed for Pain.      Historical Provider, MD cyclobenzaprine (FLEXERIL) 10 MG tablet Take 10 mg by mouth nightly    Historical Provider, MD   ezetimibe (ZETIA) 10 MG tablet Take 1 tablet by mouth daily 7/8/20 Lelan Drilling, APRN - CNP   levothyroxine (SYNTHROID) 100 MCG tablet Take 1 tablet by mouth Daily 7/8/20 Lelan Drilling, APRN - CNP   lisinopril (PRINIVIL;ZESTRIL) 10 MG tablet Take 1 tablet by mouth daily 7/8/20 Lelan Drilling, APRN - CNP       BP (!) 128/93   Pulse 97   Temp 98.2 °F (36.8 °C) (Oral)   Resp 11   Wt 211 lb (95.7 kg)   SpO2 96%   BMI 34.06 kg/m²     Physical Exam  Vitals signs and nursing note reviewed. Constitutional:       General: She is not in acute distress. Appearance: She is well-developed. She is not ill-appearing, toxic-appearing or diaphoretic. HENT:      Head: Normocephalic and atraumatic. Right Ear: External ear normal.      Left Ear: External ear normal.      Nose: Nose normal.   Eyes:      Conjunctiva/sclera: Conjunctivae normal.      Pupils: Pupils are equal, round, and reactive to light. Neck:      Musculoskeletal: Normal range of motion and neck supple. Cardiovascular:      Rate and Rhythm: Normal rate and regular rhythm. Heart sounds: Normal heart sounds. Pulmonary:      Effort: Pulmonary effort is normal.      Breath sounds: Decreased breath sounds present. No wheezing, rhonchi or rales. Abdominal:      General: Bowel sounds are normal.      Palpations: Abdomen is soft. Musculoskeletal:      Comments: Arm brace from surgery in place. Extremity is neurovascularly intact   Skin:     General: Skin is warm and dry. Neurological:      General: No focal deficit present. Mental Status: She is alert and oriented to person, place, and time. GCS: GCS eye subscore is 4. GCS verbal subscore is 5. GCS motor subscore is 6. Psychiatric:         Behavior: Behavior normal.         Thought Content:  Thought content normal.         Judgment: Judgment normal.         MDM:    Results for orders placed or performed during the hospital encounter of 12/31/20   CBC Auto Differential   Result Value Ref Range    WBC 19.1 (H) 4.0 - 10.5 K/CU MM    RBC 5.28 4.2 - 5.4 M/CU MM    Hemoglobin 15.6 12.5 - 16.0 GM/DL    Hematocrit 46.4 37 - 47 %    MCV 87.9 78 - 100 FL    MCH 29.5 27 - 31 PG    MCHC 33.6 32.0 - 36.0 %    RDW 12.5 11.7 - 14.9 %    Platelets 074 794 - 953 K/CU MM    MPV 10.5 7.5 - 11.1 FL    Differential Type AUTOMATED DIFFERENTIAL     Segs Relative 86.5 (H) 36 - 66 %    Lymphocytes % 7.7 (L) 24 - 44 %    Monocytes % 5.4 (H) 0 - 4 %    Eosinophils % 0.0 0 - 3 %    Basophils % 0.1 0 - 1 %    Segs Absolute 16.5 K/CU MM    Lymphocytes Absolute 1.5 K/CU MM    Monocytes Absolute 1.0 K/CU MM    Eosinophils Absolute 0.0 K/CU MM    Basophils Absolute 0.0 K/CU MM    Immature Neutrophil % 0.3 0 - 0.43 %    Total Immature Neutrophil 0.05 K/CU MM   Basic Metabolic Panel   Result Value Ref Range    Sodium 141 135 - 145 MMOL/L    Potassium 4.2 3.5 - 5.1 MMOL/L    Chloride 105 99 - 110 mMol/L    CO2 12 (L) 21 - 32 MMOL/L    Anion Gap 24 (H) 4 - 16    BUN 8 6 - 23 MG/DL    CREATININE 0.9 0.6 - 1.1 MG/DL    Glucose 116 (H) 70 - 99 MG/DL    Calcium 9.6 8.3 - 10.6 MG/DL    GFR Non-African American >60 >60 mL/min/1.73m2    GFR African American >60 >60 mL/min/1.73m2   Troponin   Result Value Ref Range    Troponin T <0.010 <0.01 NG/ML   D-Dimer, Quantitative   Result Value Ref Range    D-Dimer, Quant 412 (H) <230 NG/mL(DDU)     CTA PULMONARY W CONTRAST   Final Result   1. No evidence of pulmonary embolism. 2. Subsegmental right lower lobe atelectasis. 3. Scattered nonspecific subtle ground-glass opacities in the periphery of   both lungs. Differential diagnostic considerations include mild edema or   atypical/viral infection. XR CHEST PORTABLE   Final Result   Patchy airspace changes in the lung bases bilaterally, left worse than right   suspicious for infiltrates. Follow up to resolution is suggested. She appears to have an uncomplicated atypical pneumonia. I am going to initiate treatment. Patient was given first dose of Zithromax in the emergency department and then she was given prescription for Zithromax. Patient will follow up with primary care provider and orthopedics as directed. The patient has already had a negative Covid test and she has refused an additional test at this time. My typical dicussion, presentation,and considerations for this patients' chief complaint, diagnosis, differential diagnosis, medications, medication use,  medication safety and medication interactions have been explained and outlined to this patient for thispatient encounter. I have stressed need for follow up and reexamination for this encounter and or return to the emergency department if any changes or any concern. Final Impression    1. Dyspnea and respiratory abnormalities    2.  Atypical pneumonia              [address] Savannah Holt DO  01/01/21 0338

## 2021-01-02 PROCEDURE — 93010 ELECTROCARDIOGRAM REPORT: CPT | Performed by: INTERNAL MEDICINE

## 2021-01-05 LAB
EKG ATRIAL RATE: 77 BPM
EKG DIAGNOSIS: NORMAL
EKG P AXIS: 35 DEGREES
EKG P-R INTERVAL: 154 MS
EKG Q-T INTERVAL: 400 MS
EKG QRS DURATION: 78 MS
EKG QTC CALCULATION (BAZETT): 452 MS
EKG R AXIS: 23 DEGREES
EKG T AXIS: 70 DEGREES
EKG VENTRICULAR RATE: 77 BPM

## 2021-01-11 LAB
EKG ATRIAL RATE: 109 BPM
EKG DIAGNOSIS: NORMAL
EKG P AXIS: 44 DEGREES
EKG P-R INTERVAL: 160 MS
EKG Q-T INTERVAL: 354 MS
EKG QRS DURATION: 78 MS
EKG QTC CALCULATION (BAZETT): 476 MS
EKG R AXIS: -2 DEGREES
EKG T AXIS: 60 DEGREES
EKG VENTRICULAR RATE: 109 BPM

## 2021-01-15 ENCOUNTER — OFFICE VISIT (OUTPATIENT)
Dept: ORTHOPEDIC SURGERY | Age: 56
End: 2021-01-15

## 2021-01-15 VITALS — WEIGHT: 211 LBS | HEIGHT: 66 IN | BODY MASS INDEX: 33.91 KG/M2

## 2021-01-15 DIAGNOSIS — S46.011A TRAUMATIC COMPLETE TEAR OF RIGHT ROTATOR CUFF, INITIAL ENCOUNTER: Primary | ICD-10-CM

## 2021-01-15 DIAGNOSIS — Z98.890 S/P RIGHT ROTATOR CUFF REPAIR: ICD-10-CM

## 2021-01-15 PROCEDURE — 99024 POSTOP FOLLOW-UP VISIT: CPT | Performed by: PHYSICIAN ASSISTANT

## 2021-01-15 RX ORDER — HYDROCODONE BITARTRATE AND ACETAMINOPHEN 5; 325 MG/1; MG/1
1 TABLET ORAL EVERY 8 HOURS PRN
Qty: 21 TABLET | Refills: 0 | Status: SHIPPED | OUTPATIENT
Start: 2021-01-15 | End: 2021-01-22

## 2021-01-15 NOTE — PROGRESS NOTES
Patient returns for post op right shoulder rotator cuff repair 12/21/20. She presents today with her sling on. Her pain today is 4/10.  She complaints of muscle spasams on the right shoulder/ scapula area

## 2021-01-15 NOTE — PATIENT INSTRUCTIONS
Continue use of sling  Continue non weight bearing  Physical therapy ordered  Follow up 4 weeks with Dr Angie Dalton

## 2021-01-20 RX ORDER — ONDANSETRON 4 MG/1
4 TABLET, FILM COATED ORAL EVERY 8 HOURS PRN
Qty: 30 TABLET | Refills: 0 | Status: SHIPPED | OUTPATIENT
Start: 2021-01-20 | End: 2021-02-18

## 2021-01-28 ENCOUNTER — TELEPHONE (OUTPATIENT)
Dept: ORTHOPEDIC SURGERY | Age: 56
End: 2021-01-28

## 2021-02-01 ENCOUNTER — HOSPITAL ENCOUNTER (OUTPATIENT)
Dept: PHYSICAL THERAPY | Age: 56
Setting detail: THERAPIES SERIES
Discharge: HOME | End: 2021-02-01
Payer: COMMERCIAL

## 2021-02-01 DIAGNOSIS — S46.011A TRAUMATIC COMPLETE TEAR OF RIGHT ROTATOR CUFF, INITIAL ENCOUNTER: Primary | ICD-10-CM

## 2021-02-01 PROCEDURE — 97016 VASOPNEUMATIC DEVICE THERAPY: CPT

## 2021-02-01 PROCEDURE — 97161 PT EVAL LOW COMPLEX 20 MIN: CPT

## 2021-02-01 PROCEDURE — 97140 MANUAL THERAPY 1/> REGIONS: CPT

## 2021-02-01 RX ORDER — HYDROCODONE BITARTRATE AND ACETAMINOPHEN 5; 325 MG/1; MG/1
1 TABLET ORAL EVERY 6 HOURS PRN
Qty: 25 TABLET | Refills: 0 | Status: SHIPPED | OUTPATIENT
Start: 2021-02-01 | End: 2021-02-08

## 2021-02-01 ASSESSMENT — PAIN SCALES - GENERAL: PAINLEVEL_OUTOF10: 7

## 2021-02-01 NOTE — FLOWSHEET NOTE
low pressure. Patient had negative skin reaction afterwards. Communication with other providers:        Assessment:  (Response towards treatment session) (Pain Rating)  Pt tolerated  treatment without any adverse reactions or complications this date. . Pt would continue to benefit from skilled therapy interventions to address remaining impairments, improve mobility and strength,  and progress toward goal completion and prepare for d/c including finalizing HEP ;  while reducing risk for re-injury or further decline.   Pain complaints after session 7/10      Plan for Next Session:  focus on PROM R shoulder/ trial counter top walk aways      Time In / Time Out:           If [facility] Please Indicate Time In/Out/Total Time  CPT Code Time in Time out Total Time   PT eval  1010  1026  16   manual  1026  1048  22   vaso  1048  1100  12                                 Total for session      50       Timed Code/Total Treatment Minutes:        Next Progress Note due:        Plan of Care Interventions:  [x] Therapeutic Exercise  [] Modalities:  [x] Therapeutic Activity     [] Ultrasound  [] Estim  [] Gait Training      [] Cervical Traction [] Lumbar Traction  [x] Neuromuscular Re-education    [] Cold/hotpack [] Iontophoresis   [x] Instruction in HEP      [x] Vasopneumatic   [] Dry Needling    [x] Manual Therapy               [] Aquatic Therapy              Electronically signed by:  Waldo Austin 2/2/2021, 9:35 AM

## 2021-02-01 NOTE — PLAN OF CARE
Outpatient Physical Therapy           [place]           [] Phone: [phone]   Fax: [phone]  [place] [facility]           [x] Phone: [phone]   Fax: [phone]     To: Referring Practitioner: Aura Hairston    From: Martin Vazquez, PT     Patient: Andrea Rodas       : 1965  Diagnosis: Diagnosis: R RC repair 20   Treatment Diagnosis: Treatment Diagnosis: R shoulder pain/R shoulder stiffness/ impaired mobility   Date: 2021    Physical Therapy Certification/Re-Certification Form    The following patient has been evaluated for physical therapy services and for therapy to continue, insurance requires physician review of the treatment plan initially and every 90 days. Please review the attached evaluation and/or summary of the patient's plan of care, and verify that you agree therapy should continue by signing the attached document and sending it back to our office. Assessment:    Patient primary complaints:  R shoulder pain/R shoulder stiffness/ impaired mobility    History of condition:R RC repair 20; - no prior surgeries; sleeping in recliner; out of pain meds- has called surgeon; performing pendulums 2-3 day  Current functional limitations: in sling - requires assist for dressing/ and all IADL  Clinical findings: quick DASH 52/55; PROM R Shoulder Flex  0-180: supine 75* limited by pain; R Shoulder ABduction 0-180: supine 70* limited by pain; R Shoulder Int Rotation  0-70: supine shoulder @ 45* ABD= 25* IR; R Shoulder Ext Rotation  0-90: supine shoulder @ 45* ABD= 15* ER; Cervical: L ROT AROM, SB to R/L all pull in R UT  PLOF:employed as - independent with all necessary ADL/IADL- function was limited/slowed by R RC tear/pain  Skilled PT interventions are intended to:   Address ROM deficits which will enable patient  to return to PLOF/employment  Patient agrees with established plan of care and assisted in the development of their  goals  Barriers to learning:none- no mental/cognitive barriers observed  Preferred learning style(s):   written- demonstration -practice  Preferred Language: English  Potential barriers to progress:none  The patient appears motivated to participate in PT and regain PLOF: yes    Plan of Care/Treatment to date:  [x] Therapeutic Exercise  [] Modalities:  [x] Therapeutic Activity     [] Ultrasound  [] Electrical Stimulation  [] Gait Training      [] Cervical Traction [] Lumbar Traction  [x] Neuromuscular Re-education    [] Cold/hotpack [] Iontophoresis   [x] Instruction in HEP      [x] Vasopneumatic    [] Dry Needling  [x] Manual Therapy               [] Aquatic Therapy       Other:        Frequency/Duration:  # Days per week: [] 1 day # Weeks: [] 1 week [] 5 weeks     [x] 2 days   [] 2 weeks [x] 6 weeks     [] 3 days   [] 3 weeks [] 7 weeks     [] 4 days   [] 4 weeks [] 8 weeks         [] 9 weeks [] 10 weeks         [] 11 weeks [] 12 weeks  Rehab Potential/Progress: [] Excellent [x] Good [] Fair  [] Poor   Goals:         Long term goals  Time Frame for Long term goals : 12 weeks  Long term goal 1: patient's goal - regain R shoulder function and return to work  Long term goal 2: patientmwill score 33/55 on Quick DASH indicating improved R UE function with ADL/IADL and or less R UE pain symptoms- initial score = 52/55  Long term goal 3: patient will have 120* of FLEX AROM in an upright position inorder to order for ability to reach above shoulder height for ADL/IADL function  Long term goal 4: patient will be independent with HEP in order to be prepared for discharge  Electronically signed by:  Jaz Rivera PT, 2/1/2021, 5:54 PM  If you have any questions or concerns, please don't hesitate to call.   Thank you for your referral.      Physician Signature:________________________________Date:_________ TIME: _____  By signing above, therapists plan is approved by physician

## 2021-02-02 ASSESSMENT — PAIN SCALES - GENERAL: PAINLEVEL_OUTOF10: 7

## 2021-02-02 ASSESSMENT — PAIN - FUNCTIONAL ASSESSMENT: PAIN_FUNCTIONAL_ASSESSMENT: PREVENTS OR INTERFERES WITH ALL ACTIVE AND SOME PASSIVE ACTIVITIES

## 2021-02-02 ASSESSMENT — PAIN DESCRIPTION - LOCATION: LOCATION: ABDOMEN

## 2021-02-02 NOTE — PROGRESS NOTES
Physical Therapy  Initial Assessment  Date: 2021- eval performed 21  Patient Name: Rafat Bourgeois  MRN: [id number]  : 1965     Treatment Diagnosis: R shoulder pain/R shoulder stiffness/ impaired mobility    Restrictions  Position Activity Restriction  Other position/activity restrictions: as of - no AROM R shoulder    Subjective   General  Chart Reviewed: Yes  Patient assessed for rehabilitation services?: Yes  Family / Caregiver Present: No  Referring Practitioner: Olive Tanner  Diagnosis: R RC repair 20  Follows Commands: Within Functional Limits  PT Visit Information  PT Insurance Information: [facility] x 12 sessions- expires 3/5/21  Subjective  Subjective: s/p R RC repair 20- no prior surgeries; sleeping in recliner; out of pain meds- has called surgeon; performing pendulums 2-3 day  Pain Screening  Patient Currently in Pain: Yes  Pain Assessment  Pain Level: 7  Pain Location: Abdomen  Pain Orientation: Right  Pain Descriptors: Sharp  Pain Frequency: Continuous  Clinical Progression: Not changed  Functional Pain Assessment: Prevents or interferes with all active and some passive activities  Vital Signs  Patient Currently in Pain: Yes    Vision/Hearing  Vision  Vision: Within Functional Limits  Hearing  Hearing: Within functional limits    Orientation  Orientation  Overall Orientation Status: Within Normal Limits    Social/Functional History  Social/Functional History  Lives With: Significant other  Type of Home: House  Home Layout: Two level; Able to Live on Main level with bedroom/bathroom  Home Access: Stairs to enter without rails  ADL Assistance: Independent  Bath: Moderate assistance  Dressing: Moderate assistance  Grooming: Moderate assistance  Homemaking Assistance: Needs assistance  Meal Prep: Minimal  Laundry: Moderate  Vacuuming: Maximal  Cleaning: Maximal  Gardening: Unable to assess (comment)  Yard Work: Unable to assess (comment)  Shopping:  Moderate  Homemaking Responsibilities: Yes  Meal Prep Responsibility: Primary  Laundry Responsibility: Primary  Cleaning Responsibility: Primary  Shopping Responsibility: Primary  Ambulation Assistance: Independent  Transfer Assistance: Independent  Active : (not currently)  Occupation: Full time employment  Type of occupation:   Leisure & Hobbies: ride [employer]    Objective     Observation/Palpation  Posture: Fair  Observation: in sling    PROM RUE (degrees)  R Shoulder Flex  0-180: supine 75* limited by pain  R Shoulder ABduction 0-180: supine 70* limited by pain  R Shoulder Int Rotation  0-70: supine shoulder @ 45* ABD= 25* IR  R Shoulder Ext Rotation  0-90: supine shoulder @ 45* ABD= 15* ER  Spine  Cervical: L ROT AROM, SB to R/L all pull in R UT    Strength RUE  Comment: shoulder MMT deferred  Strength Other  Other: poor scap set                                                   Assessment   Conditions Requiring Skilled Therapeutic Intervention  Body structures, Functions, Activity limitations: Decreased ROM; Decreased high-level IADLs  Assessment: quick DASH 52/55  Treatment Diagnosis: R shoulder pain/R shoulder stiffness/ impaired mobility  Prognosis: Good  Decision Making: Low Complexity  History: no PF  Exam: Quick DASH/ shoulder ROM  Clinical Presentation: changing characteristics of function due to pain  Barriers to Learning: none  REQUIRES PT FOLLOW UP: Yes         Plan        G-Code       OutComes Score                                                  AM-PAC Score             Goals  Long term goals  Time Frame for Long term goals : 12 weeks  Long term goal 1: patient's goal - regain R shoulder function and return to work  Long term goal 2: patientmwill score 33/55 on Quick DASH indicating improved R UE function with ADL/IADL and or less R UE pain symptoms- initial score = 52/55  Long term goal 3: patient will have 120* of FLEX AROM in an upright position inorder to order for ability to reach above shoulder

## 2021-02-05 ENCOUNTER — HOSPITAL ENCOUNTER (OUTPATIENT)
Dept: PHYSICAL THERAPY | Age: 56
Setting detail: THERAPIES SERIES
Discharge: HOME | End: 2021-02-05
Payer: COMMERCIAL

## 2021-02-05 PROCEDURE — 97110 THERAPEUTIC EXERCISES: CPT

## 2021-02-05 PROCEDURE — 97016 VASOPNEUMATIC DEVICE THERAPY: CPT

## 2021-02-05 PROCEDURE — 97140 MANUAL THERAPY 1/> REGIONS: CPT

## 2021-02-05 NOTE — FLOWSHEET NOTE
Outpatient Physical Therapy  [place]           [x] Phone: [phone]   Fax: [phone]  [place]           [] Phone: [phone]   Fax: [phone]        Physical Therapy Daily Treatment Note  Date:  2021    Patient Name:  Faiza Peguero    :  1965  MRN: [id number]  Restrictions/Precautions:  Other position/activity restrictions: as of - no AROM R shoulder  Diagnosis:   Diagnosis: R RC repair 20  Date of Injury/Surgery:   Treatment Diagnosis: Treatment Diagnosis: R shoulder pain/R shoulder stiffness/ impaired mobility    Insurance/Certification information: PT Insurance Information: [facility] x 12 sessions- expires 3/5/21   Referring Physician:  Referring Practitioner: Timothy Rosales Doctor Visit:    Plan of care signed (Y/N):    Outcome Measure:  quick DASH 52/55  Visit# / total visits:   2/10 then PN  Pain level: 8/10 R shoulder   Goals:          Long term goals  Time Frame for Long term goals : 12 weeks  Long term goal 1: patient's goal - regain R shoulder function and return to work  Long term goal 2: patientmwill score 33/55 on Quick DASH indicating improved R UE function with ADL/IADL and or less R UE pain symptoms- initial score = 52/55  Long term goal 3: patient will have 120* of FLEX AROM in an upright position inorder to order for ability to reach above shoulder height for ADL/IADL function  Long term goal 4: patient will be independent with HEP in order to be prepared for discharge    Summary of Evaluation  ASSESSMENT  Patient primary complaints:  R shoulder pain/R shoulder stiffness/ impaired mobility    History of condition:R RC repair 20; - no prior surgeries; sleeping in recliner; out of pain meds- has called surgeon; performing pendulums 2-3 day  Current functional limitations: in sling - requires assist for dressing/ and all IADL  Clinical findings: quick DASH 52/55; PROM R Shoulder Flex  0-180: supine 75* limited by pain; R Shoulder ABduction 0-180: supine 70* limited by pain; R Shoulder Int Rotation  0-70: supine shoulder @ 45* ABD= 25* IR; R Shoulder Ext Rotation  0-90: supine shoulder @ 45* ABD= 15* ER; Cervical: L ROT AROM, SB to R/L all pull in R UT  PLOF:employed as - independent with all necessary ADL/IADL- function was limited/slowed by R RC tear/pain  Skilled PT interventions are intended to: Address ROM deficits which will enable patient  to return to PLOF/employment  Patient agrees with established plan of care and assisted in the development of their  goals  Barriers to learning:none- no mental/cognitive barriers observed  Preferred learning style(s):   written- demonstration -practice  Preferred Language: English  Potential barriers to progress:none  The patient appears motivated to participate in PT and regain PLOF: yes    Subjective:  Difficulty sleeping, sleeping in recliner. Patient comes to therapy with 8/10 pain. Increased pain after last visit, patient drove herself to therapy this visit. HEP compliance. Any changes in Ambulatory Summary Sheet? None        Objective:  See eval. TTP of R. UT during STM. Wearing Sling w/ ABD pillow. Prior to today's treatment session, patient was screened for signs and symptoms related to COVID-19 including but not limited to verbally answering questions related to feeling ill, cough, or SOB, along with taking temperature via forehead thermometer. Patient presented with all negative signs and symptoms and had no fever >100 degrees Fahrenheit this date.          Exercises: (No more than 4 columns)   Exercise/Equipment Date 2/1/21 Date 2/5/21 Date           WARM UP         penulum  x15 each way          TABLE      Neck ROM ROT/ SB 3x30\"    Seated scap sets 5 ct 2x10     Supine shoulder PROM FLEX, ABD and ER/IR @45* ABD FLEX, ABD and ER/IR @45* ABD    Elbow AROM X 5 reps x10             STANDING                                                     PROPRIOCEPTION MODALITIES See below See below                     Other Therapeutic Activities/Education:        Home Exercise Program:  Pendulum/ elbow AROM/neck AROM      Manual Treatments:  Supine PROM, UT STM/TPR      Modalities:  Patient received vasocompression on their R shoulder  for pain and inflammation for 10 min on low pressure. Patient had negative skin reaction afterwards. Communication with other providers:        Assessment:  (Response towards treatment session) (Pain Rating)  Pain complaints after session, 8/10, soreness/burning sensation. Patient had elevated pain levels throughout PROM. Edu. On HEP Pt would continue to benefit from skilled therapy interventions to address remaining impairments, improve mobility and strength,  and progress toward goal completion and prepare for d/c including finalizing HEP ;  while reducing risk for re-injury or further decline. Plan for Next Session:  focus on PROM R shoulder/ trial counter top walk always. Continue per Protocol.        Time In / Time Out:    9988/0689       If [facility] Please Indicate Time In/Out/Total Time  CPT Code Time in Time out Total Time   exercises   0759  0820  21   manual  0820  0845  25   vaso  0845  0855  10                                 Total for session      56       Timed Code/Total Treatment Minutes: 56  21'TE 25'MT 10'Vaso      Next Progress Note due:        Plan of Care Interventions:  [x] Therapeutic Exercise  [] Modalities:  [x] Therapeutic Activity     [] Ultrasound  [] Estim  [] Gait Training      [] Cervical Traction [] Lumbar Traction  [x] Neuromuscular Re-education    [] Cold/hotpack [] Iontophoresis   [x] Instruction in HEP      [x] Vasopneumatic   [] Dry Needling    [x] Manual Therapy               [] Aquatic Therapy              Electronically signed by:  ADRIANNE Richards 2/5/2021, 7:59 AM

## 2021-02-09 ENCOUNTER — HOSPITAL ENCOUNTER (OUTPATIENT)
Dept: PHYSICAL THERAPY | Age: 56
Setting detail: THERAPIES SERIES
Discharge: HOME | End: 2021-02-09
Payer: COMMERCIAL

## 2021-02-09 PROCEDURE — 97140 MANUAL THERAPY 1/> REGIONS: CPT

## 2021-02-09 PROCEDURE — 97016 VASOPNEUMATIC DEVICE THERAPY: CPT

## 2021-02-09 PROCEDURE — 97110 THERAPEUTIC EXERCISES: CPT

## 2021-02-09 NOTE — FLOWSHEET NOTE
MODALITIES See below See below  See below                    Other Therapeutic Activities/Education:        Home Exercise Program:  Pendulum/ elbow AROM/neck AROM      Manual Treatments:  Supine PROM, UT STM/TPR; scapular mobs in sidelying      Modalities:  Patient received vasocompression on their R shoulder  for pain and inflammation for 15 min on low pressure. Patient had negative skin reaction afterwards. Communication with other providers:        Assessment:  (Response towards treatment session) (Pain Rating)  Pain complaints after session, 8/10, soreness/burning sensation. Patient had elevated pain levels throughout PROM. Edu. On HEP Pt would continue to benefit from skilled therapy interventions to address remaining impairments, improve mobility and strength,  and progress toward goal completion and prepare for d/c including finalizing HEP ;  while reducing risk for re-injury or further decline. Plan for Next Session:  focus on PROM R shoulder/ trial counter top walk always. Continue per Protocol.        Time In / Time Out:    0759/0808       If [facility] Please Indicate Time In/Out/Total Time  CPT Code Time in Time out Total Time   exercises   1000  1017  17   manual  1017 1032  15   vaso  1032 1044  12                                 Total for session      44       Timed Code/Total Treatment Minutes: 17'TE 15'MT 12'Vaso      Next Progress Note due:        Plan of Care Interventions:  [x] Therapeutic Exercise  [] Modalities:  [x] Therapeutic Activity     [] Ultrasound  [] Estim  [] Gait Training      [] Cervical Traction [] Lumbar Traction  [x] Neuromuscular Re-education    [] Cold/hotpack [] Iontophoresis   [x] Instruction in HEP      [x] Vasopneumatic   [] Dry Needling    [x] Manual Therapy               [] Aquatic Therapy              Electronically signed by:  Vanessa Vieiar 2/9/2021, 10:03 AM

## 2021-02-10 ENCOUNTER — OFFICE VISIT (OUTPATIENT)
Dept: ORTHOPEDIC SURGERY | Age: 56
End: 2021-02-10

## 2021-02-10 VITALS
WEIGHT: 211 LBS | HEART RATE: 76 BPM | RESPIRATION RATE: 15 BRPM | HEIGHT: 66 IN | OXYGEN SATURATION: 98 % | BODY MASS INDEX: 33.91 KG/M2

## 2021-02-10 DIAGNOSIS — Z98.890 S/P RIGHT ROTATOR CUFF REPAIR: ICD-10-CM

## 2021-02-10 DIAGNOSIS — S46.011A TRAUMATIC COMPLETE TEAR OF RIGHT ROTATOR CUFF, INITIAL ENCOUNTER: Primary | ICD-10-CM

## 2021-02-10 PROCEDURE — 99024 POSTOP FOLLOW-UP VISIT: CPT | Performed by: ORTHOPAEDIC SURGERY

## 2021-02-10 RX ORDER — HYDROCODONE BITARTRATE AND ACETAMINOPHEN 5; 325 MG/1; MG/1
1 TABLET ORAL EVERY 8 HOURS PRN
Qty: 25 TABLET | Refills: 0 | Status: SHIPPED | OUTPATIENT
Start: 2021-02-10 | End: 2021-02-17

## 2021-02-10 ASSESSMENT — ENCOUNTER SYMPTOMS
SHORTNESS OF BREATH: 0
COLOR CHANGE: 0

## 2021-02-10 NOTE — PROGRESS NOTES
Subjective:      Patient ID: Manjula Doan is a 54 y.o. female. Patient returns to the office today for post op check of the right shoulder rotator repair DOS 12/31/20 [facility]. Pt is currently in physical therapy. Pt states pain today is a 5/10. Pt states that pain after physical therapy will increase to a 9/10 and she will have to take a norco to help with the pain. Pt states that she is still not able to lay on her back do to the pain. Pt states she can tell she is healing. She comes in today for her 6-week postop recheck after right shoulder rotator cuff repair. She states that overall her pain is improving but she continues to have stiffness and tightness but this is getting better with physical therapy. She has continued wearing her sling and pillow. Patient denies any new injury to the involved extremity/ joint, denies numbness or tingling in the involved extremity and denies fever or chills. Review of Systems   Constitutional: Negative for activity change, chills and fever. Respiratory: Negative for shortness of breath. Cardiovascular: Negative for chest pain. Musculoskeletal: Positive for arthralgias and myalgias. Negative for gait problem and joint swelling. Skin: Negative for color change, pallor, rash and wound. Neurological: Positive for weakness. Negative for numbness. Past Medical History:   Diagnosis Date    COPD (chronic obstructive pulmonary disease) ([address]     Denies COPD, uses inhaler for SOB - prn - hx: smoking    H/O echocardiogram 08/28/2018    EF 55-60%. No significant valvular disease.  Hernia, umbilical     History of cardiac monitoring 07/31/2018    Conclusion: 30 days event monitor suggesting sinus rhythm with symptomatic sinus tachycardia.   So the symptoms are reported during normal rate and rhythm    Hyperlipidemia LDL goal <130 7/31/2018    Hypertension     Follows with PCP    Hypothyroidism     Prolonged emergence from general anesthesia  Tachycardia     Wears partial dentures        Objective:   Physical Exam  Constitutional:       Appearance: She is well-developed. HENT:      Head: Normocephalic and atraumatic. Eyes:      Pupils: Pupils are equal, round, and reactive to light. Neck:      Musculoskeletal: Normal range of motion. Pulmonary:      Effort: Pulmonary effort is normal.   Musculoskeletal:         General: Tenderness present. No deformity. Right shoulder: She exhibits tenderness, pain and decreased strength. She exhibits normal range of motion, no bony tenderness, no swelling, no effusion, no crepitus, no deformity, no laceration, no spasm and normal pulse. Left shoulder: She exhibits normal range of motion, no tenderness, no bony tenderness, no swelling, no effusion, no crepitus, no deformity, no laceration, no pain, no spasm, normal pulse and normal strength. Right elbow: Normal.     Left elbow: Normal.   Skin:     General: Skin is warm and dry. Coloration: Skin is not pale. Findings: No erythema or rash. Neurological:      Mental Status: She is alert and oriented to person, place, and time. Sensory: No sensory deficit. Right shoulder-Incision clean, dry, intact, with no erythema, no drainage, and no signs of infection. Flexion 100  Abduction 90  External rotation 45   Internal rotation 45   Positive Conrad sign. Strength 4/5 to resisted abduction. Assessment:      Right shoulder rotator cuff repair, 6 weeks      Plan:       I discussed with her today that she is progressing very well. I discussed with the patient today that their are symptoms are normal and should improve with time. I reassured her that it will take 9 months for complete improvement in her symptoms. At this point she can discontinue her sling and pillow. Begin active range of motion exercises with the right arm. Limit weightbearing to 10 pounds at the side only. Continue formal physical therapy.   I did give her 1 last refill on her Norco then she is to switch to over-the-counter Tylenol or ibuprofen. Ice or heat as needed. Follow-up in 6 weeks for recheck.           Sachin Sevilla, DO

## 2021-02-10 NOTE — PROGRESS NOTES
Patient returns to the office today for post op check of the right shoulder rotator repair DOS 12/31/20 [facility]. Pt is currently in physical therapy. Pt states pain today is a 5/10. Pt states that pain after physical therapy will increase to a 9/10 and she will have to take a norco to help with the pain. Pt states that she is still not able to lay on her back do to the pain. Pt states she can tell she is healing.

## 2021-02-11 ENCOUNTER — HOSPITAL ENCOUNTER (OUTPATIENT)
Dept: PHYSICAL THERAPY | Age: 56
Setting detail: THERAPIES SERIES
Discharge: HOME | End: 2021-02-11
Payer: COMMERCIAL

## 2021-02-11 PROCEDURE — 97140 MANUAL THERAPY 1/> REGIONS: CPT

## 2021-02-11 PROCEDURE — 97110 THERAPEUTIC EXERCISES: CPT

## 2021-02-11 PROCEDURE — 97016 VASOPNEUMATIC DEVICE THERAPY: CPT

## 2021-02-11 NOTE — FLOWSHEET NOTE
Outpatient Physical Therapy  [place]           [x] Phone: [phone]   Fax: [phone]  [place]           [] Phone: [phone]   Fax: [phone]        Physical Therapy Daily Treatment Note  Date:  2021    Patient Name:  Luciano Sherman    :  1965  MRN: [id number]  Restrictions/Precautions:  Other position/activity restrictions: as of - no AROM R shoulder  Diagnosis:   Diagnosis: R RC repair 20  Date of Injury/Surgery:   Treatment Diagnosis: Treatment Diagnosis: R shoulder pain/R shoulder stiffness/ impaired mobility    Insurance/Certification information: PT Insurance Information: [facility] x 12 sessions- expires 3/5/21   Referring Physician:  Referring Practitioner: Angie Dalton  Next Doctor Visit:    Plan of care signed (Y/N):    Outcome Measure:  quick DASH 52/55  Visit# / total visits:   4/10 then PN  Pain level: 8/10 R shoulder   Goals:          Long term goals  Time Frame for Long term goals : 12 weeks  Long term goal 1: patient's goal - regain R shoulder function and return to work  Long term goal 2: patientmwill score 33/55 on Quick DASH indicating improved R UE function with ADL/IADL and or less R UE pain symptoms- initial score = 52/55  Long term goal 3: patient will have 120* of FLEX AROM in an upright position inorder to order for ability to reach above shoulder height for ADL/IADL function  Long term goal 4: patient will be independent with HEP in order to be prepared for discharge    Summary of Evaluation  ASSESSMENT  Patient primary complaints:  R shoulder pain/R shoulder stiffness/ impaired mobility    History of condition:R RC repair 20; - no prior surgeries; sleeping in recliner; out of pain meds- has called surgeon; performing pendulums 2-3 day  Current functional limitations: in sling - requires assist for dressing/ and all IADL  Clinical findings: quick DASH 52/55; PROM R Shoulder Flex  0-180: supine 75* limited by pain; R Shoulder ABduction 0-180: supine 70* limited by pain; R Shoulder Int Rotation  0-70: supine shoulder @ 45* ABD= 25* IR; R Shoulder Ext Rotation  0-90: supine shoulder @ 45* ABD= 15* ER; Cervical: L ROT AROM, SB to R/L all pull in R UT  PLOF:employed as - independent with all necessary ADL/IADL- function was limited/slowed by R RC tear/pain  Skilled PT interventions are intended to: Address ROM deficits which will enable patient  to return to PLOF/employment  Patient agrees with established plan of care and assisted in the development of their  goals  Barriers to learning:none- no mental/cognitive barriers observed  Preferred learning style(s):   written- demonstration -practice  Preferred Language: English  Potential barriers to progress:none  The patient appears motivated to participate in PT and regain PLOF: yes    Subjective:  Difficulty sleeping, sleeping in recliner. Patient had increased pain after last visit. Patient comes into therapy and reports 8/10 pain. Patient saw doctor yesterday and is now d/c from brace. Any changes in Ambulatory Summary Sheet? None        Objective: R shoulder FLEX PROM in supine 116*     Prior to today's treatment session, patient was screened for signs and symptoms related to COVID-19 including but not limited to verbally answering questions related to feeling ill, cough, or SOB, along with taking temperature via forehead thermometer. Patient presented with all negative signs and symptoms and had no fever >100 degrees Fahrenheit this date.          Exercises: (No more than 4 columns)   Exercise/Equipment Date 2/1/21 Date 2/5/21 Date 2/9/21 2/11/21            WARM UP          penulum  x15 each way x15 each way x15 each way          TABLE       Neck ROM ROT/ SB 3x30\" -- ---   Seated scap sets 5 ct 2x10  1 x5 1 x10    Supine shoulder PROM FLEX, ABD and ER/IR @45* ABD FLEX, ABD and ER/IR @45* ABD FLEX, ABD and ER/IR @45* ABD    Elbow AROM X 5 reps x10 X 5 X 5      Supine shoulder AAROM   Holding towel - press to ceiling x10 reps Holding towel - press to ceiling x10 reps   STANDING       Counter top bows    5 ct x 5   5 ct x 5    ER with cane     x10 3\" hold @ wall                                           PROPRIOCEPTION                                          MODALITIES See below See below  See below  See below                      Other Therapeutic Activities/Education:        Home Exercise Program:  Pendulum/ elbow AROM/neck AROM      Manual Treatments:  Supine PROM; scapular mobs in sidelying      Modalities:  Patient received vasocompression on their R shoulder  for pain and inflammation for 10 min on low pressure. Patient had negative skin reaction afterwards. Communication with other providers:        Assessment:  (Response towards treatment session) (Pain Rating)  Pain complaints after session, 9/10, soreness/burning sensation. Patient had elevated pain levels throughout PROM. Initiated PROM ER with cane, elevated pain levels. Decreased vaso time for pt. Pain tolerance. Pt would continue to benefit from skilled therapy interventions to address remaining impairments, improve mobility and strength,  and progress toward goal completion and prepare for d/c including finalizing HEP ;  while reducing risk for re-injury or further decline. Plan for Next Session:  focus on PROM R shoulder and scapular strength/mobility. Continue per Protocol.        Time In / Time Out:    1001/1049       If [facility] Please Indicate Time In/Out/Total Time  CPT Code Time in Time out Total Time   exercises   1001  1024  23   manual  1024 1039  15   vaso  1039 1049  10                                 Total for session      48       Timed Code/Total Treatment Minutes:  50'  23'TE 15'MT 10'Vaso      Next Progress Note due:        Plan of Care Interventions:  [x] Therapeutic Exercise  [] Modalities:  [x] Therapeutic Activity     [] Ultrasound  [] Estim  [] Gait Training      [] Cervical Traction [] Lumbar Traction  [x] Neuromuscular Re-education    [] Cold/hotpack [] Iontophoresis   [x] Instruction in HEP      [x] Vasopneumatic   [] Dry Needling    [x] Manual Therapy               [] Aquatic Therapy              Electronically signed by:  Caroline Diane PTA/ Isha Gambino SPTA 2/11/2021, 10:01 AM

## 2021-02-17 ENCOUNTER — HOSPITAL ENCOUNTER (OUTPATIENT)
Dept: PHYSICAL THERAPY | Age: 56
Setting detail: THERAPIES SERIES
Discharge: HOME | End: 2021-02-17
Payer: COMMERCIAL

## 2021-02-17 PROCEDURE — 97110 THERAPEUTIC EXERCISES: CPT

## 2021-02-17 PROCEDURE — 97140 MANUAL THERAPY 1/> REGIONS: CPT

## 2021-02-17 PROCEDURE — 97016 VASOPNEUMATIC DEVICE THERAPY: CPT

## 2021-02-17 NOTE — FLOWSHEET NOTE
Outpatient Physical Therapy  [place]           [x] Phone: [phone]   Fax: [phone]  [place]           [] Phone: [phone]   Fax: [phone]        Physical Therapy Daily Treatment Note  Date:  2021    Patient Name:  Santiago Mansfield    :  1965  MRN: [id number]  Restrictions/Precautions:  Other position/activity restrictions: as of - no AROM R shoulder  Diagnosis:   Diagnosis: R RC repair 20  Date of Injury/Surgery:   Treatment Diagnosis: Treatment Diagnosis: R shoulder pain/R shoulder stiffness/ impaired mobility    Insurance/Certification information: PT Insurance Information: [facility] x 12 sessions- expires 3/5/21   Referring Physician:  Referring Practitioner: Latanya Rosales Doctor Visit:    Plan of care signed (Y/N):    Outcome Measure:  quick DASH 52/55  Visit# / total visits:  5/10 then PN  Pain level: 8/10 R shoulder   Goals:          Long term goals  Time Frame for Long term goals : 12 weeks  Long term goal 1: patient's goal - regain R shoulder function and return to work  Long term goal 2: patientmwill score 33/55 on Quick DASH indicating improved R UE function with ADL/IADL and or less R UE pain symptoms- initial score = 52/55  Long term goal 3: patient will have 120* of FLEX AROM in an upright position inorder to order for ability to reach above shoulder height for ADL/IADL function  Long term goal 4: patient will be independent with HEP in order to be prepared for discharge    Summary of Evaluation  ASSESSMENT  Patient primary complaints:  R shoulder pain/R shoulder stiffness/ impaired mobility    History of condition:R RC repair 20; - no prior surgeries; sleeping in recliner; out of pain meds- has called surgeon; performing pendulums 2-3 day  Current functional limitations: in sling - requires assist for dressing/ and all IADL  Clinical findings: quick DASH 52/55; PROM R Shoulder Flex  0-180: supine 75* limited by pain; R Shoulder ABduction 0-180: supine 70* 80* FLEX                                PROPRIOCEPTION                                    MODALITIES See below  See below  See below                    Other Therapeutic Activities/Education:        Home Exercise Program:  Pendulum/ elbow AROM/neck AROM      Manual Treatments:  Supine PROM;  Modalities:  Patient received vasocompression on their R shoulder  for pain and inflammation for 10 min on low pressure. Patient had negative skin reaction afterwards. Communication with other providers:        Assessment:  (Response towards treatment session) (Pain Rating)  Pain complaints after session, 7/10,patient demonstrating increase R shoulder FLEX ROM since starting PT. Pt would continue to benefit from skilled therapy interventions to address remaining impairments, improve mobility and strength,  and progress toward goal completion and prepare for d/c including finalizing HEP ;  while reducing risk for re-injury or further decline. Plan for Next Session:  focus on PROM R shoulder and scapular strength/mobility. Continue per Protocol.        Time In / Time Out:    1001/1049       If [facility] Please Indicate Time In/Out/Total Time  CPT Code Time in Time out Total Time   exercises   1305  1335 30   manual  1335 1345 10   vaso  1345 1355  10                                 Total for session      50       Timed Code/Total Treatment Minutes:  50' 30'TE 10'MT 10'Vaso      Next Progress Note due:        Plan of Care Interventions:  [x] Therapeutic Exercise  [] Modalities:  [x] Therapeutic Activity     [] Ultrasound  [] Estim  [] Gait Training      [] Cervical Traction [] Lumbar Traction  [x] Neuromuscular Re-education    [] Cold/hotpack [] Iontophoresis   [x] Instruction in HEP      [x] Vasopneumatic   [] Dry Needling    [x] Manual Therapy               [] Aquatic Therapy              Electronically signed by:  ADRIANNE Irvin 2/17/2021, 1:19 PM

## 2021-02-18 ENCOUNTER — OFFICE VISIT (OUTPATIENT)
Dept: FAMILY MEDICINE CLINIC | Age: 56
End: 2021-02-18

## 2021-02-18 VITALS
TEMPERATURE: 97.9 F | OXYGEN SATURATION: 96 % | SYSTOLIC BLOOD PRESSURE: 138 MMHG | DIASTOLIC BLOOD PRESSURE: 80 MMHG | BODY MASS INDEX: 35.19 KG/M2 | RESPIRATION RATE: 18 BRPM | WEIGHT: 218 LBS | HEART RATE: 87 BPM

## 2021-02-18 DIAGNOSIS — E78.5 HYPERLIPIDEMIA LDL GOAL <130: ICD-10-CM

## 2021-02-18 DIAGNOSIS — Z98.890 H/O REPAIR OF RIGHT ROTATOR CUFF: Primary | ICD-10-CM

## 2021-02-18 DIAGNOSIS — Z12.31 ENCOUNTER FOR SCREENING MAMMOGRAM FOR BREAST CANCER: ICD-10-CM

## 2021-02-18 DIAGNOSIS — Z23 NEED FOR PROPHYLACTIC VACCINATION AND INOCULATION AGAINST VARICELLA: ICD-10-CM

## 2021-02-18 DIAGNOSIS — R21 RASH AND NONSPECIFIC SKIN ERUPTION: ICD-10-CM

## 2021-02-18 DIAGNOSIS — E03.9 ACQUIRED HYPOTHYROIDISM: ICD-10-CM

## 2021-02-18 DIAGNOSIS — I10 ESSENTIAL HYPERTENSION: ICD-10-CM

## 2021-02-18 DIAGNOSIS — Z13.1 ENCOUNTER FOR SCREENING FOR DIABETES MELLITUS: ICD-10-CM

## 2021-02-18 DIAGNOSIS — F17.200 CURRENT SMOKER: ICD-10-CM

## 2021-02-18 PROCEDURE — 99212 OFFICE O/P EST SF 10 MIN: CPT | Performed by: NURSE PRACTITIONER

## 2021-02-18 RX ORDER — LEVOTHYROXINE SODIUM 0.1 MG/1
100 TABLET ORAL DAILY
Qty: 90 TABLET | Refills: 1 | Status: SHIPPED | OUTPATIENT
Start: 2021-02-18 | End: 2021-08-02 | Stop reason: SDUPTHER

## 2021-02-18 RX ORDER — HYDROCODONE BITARTRATE AND ACETAMINOPHEN 5; 325 MG/1; MG/1
1 TABLET ORAL EVERY 6 HOURS PRN
COMMUNITY
End: 2022-08-03

## 2021-02-18 RX ORDER — EZETIMIBE 10 MG/1
10 TABLET ORAL DAILY
Qty: 90 TABLET | Refills: 1 | Status: SHIPPED | OUTPATIENT
Start: 2021-02-18 | End: 2021-08-02 | Stop reason: SDUPTHER

## 2021-02-18 RX ORDER — ASPIRIN 81 MG/1
81 TABLET, CHEWABLE ORAL DAILY
COMMUNITY

## 2021-02-18 RX ORDER — LISINOPRIL 10 MG/1
10 TABLET ORAL DAILY
Qty: 90 TABLET | Refills: 1 | Status: SHIPPED | OUTPATIENT
Start: 2021-02-18 | End: 2021-08-02 | Stop reason: SDUPTHER

## 2021-02-18 ASSESSMENT — ENCOUNTER SYMPTOMS
COUGH: 0
DIARRHEA: 0
CHEST TIGHTNESS: 0
VOMITING: 0
NAUSEA: 0
ABDOMINAL PAIN: 0
SHORTNESS OF BREATH: 0
CONSTIPATION: 0
WHEEZING: 0

## 2021-02-18 ASSESSMENT — PATIENT HEALTH QUESTIONNAIRE - PHQ9
SUM OF ALL RESPONSES TO PHQ9 QUESTIONS 1 & 2: 0
SUM OF ALL RESPONSES TO PHQ QUESTIONS 1-9: 0
1. LITTLE INTEREST OR PLEASURE IN DOING THINGS: 0
SUM OF ALL RESPONSES TO PHQ QUESTIONS 1-9: 0
SUM OF ALL RESPONSES TO PHQ QUESTIONS 1-9: 0

## 2021-02-18 NOTE — PROGRESS NOTES
SUBJECTIVE:    Nader Baca  1965  54 y.o.  female      Chief Complaint   Patient presents with    Medication Refill     patient had labs in december with shoulder-but she still needs a tsh and lipid-pateint is self pay    Hypothyroidism     HPI     Allergies   Allergen Reactions    Keflex [Cephalexin]      Shut kidneys down    Meloxicam Shortness Of Breath    Penicillins Anaphylaxis    Statins Swelling     Throat swelling, vomiting    Cephalosporins Itching    Sulfa Antibiotics Itching    Tramadol Itching    Aspirin     Codeine Hives and Nausea And Vomiting    Naproxen Nausea And Vomiting     Dizzy lightheaded       Current Outpatient Medications   Medication Sig Dispense Refill    HYDROcodone-acetaminophen (NORCO) 5-325 MG per tablet Take 1 tablet by mouth every 6 hours as needed for Pain (taking only 2x's a week).  lisinopril (PRINIVIL;ZESTRIL) 10 MG tablet Take 1 tablet by mouth daily 90 tablet 1    levothyroxine (SYNTHROID) 100 MCG tablet Take 1 tablet by mouth Daily 90 tablet 1    ezetimibe (ZETIA) 10 MG tablet Take 1 tablet by mouth daily 90 tablet 1    aspirin 81 MG chewable tablet Take 81 mg by mouth daily       No current facility-administered medications for this visit. Past Medical History:   Diagnosis Date    COPD (chronic obstructive pulmonary disease) ([facility] [address]     Denies COPD, uses inhaler for SOB - prn - hx: smoking    H/O echocardiogram 08/28/2018    EF 55-60%. No significant valvular disease.  Hernia, umbilical     History of cardiac monitoring 07/31/2018    Conclusion: 30 days event monitor suggesting sinus rhythm with symptomatic sinus tachycardia.   So the symptoms are reported during normal rate and rhythm    Hyperlipidemia LDL goal <130 7/31/2018    Hypertension     Follows with PCP    Hypothyroidism     Prolonged emergence from general anesthesia     Tachycardia     Wears partial dentures      Past Surgical History:   Procedure Laterality Date  CARDIAC CATHETERIZATION  2009    [place] per [place] - ([place]   [address] Bilateral 2003    CHOLECYSTECTOMY  2013    ECTOPIC PREGNANCY SURGERY  1991    ROTATOR CUFF REPAIR  12/31/2020    right side    SHOULDER ARTHROSCOPY Right 12/31/2020    RIGHT SHOULDER ARTHROSCOPY, SUBACROMIAL DECOMPRESSION, DISTAL CLAVICLE RESECTION DEBRIDEMENT ROTATOR CUFF REPAIR performed by Clydene Olszewski, DO at [facility] Right 12/31/2020    Diagnostic arthroscopy, right shoulder with rotator cuff repair.   2. subacromial decompression 3. distal clavicle resection 4. extensive debridement    TUBAL LIGATION  1991     Social History     Socioeconomic History    Marital status: Single     Spouse name: None    Number of children: None    Years of education: None    Highest education level: None   Occupational History    None   Social Needs    Financial resource strain: None    Food insecurity     Worry: None     Inability: None    Transportation needs     Medical: None     Non-medical: None   Tobacco Use    Smoking status: Current Every Day Smoker     Packs/day: 0.50     Years: 33.00     Pack years: 16.50     Types: Cigarettes     Start date: 1977    Smokeless tobacco: Never Used   Substance and Sexual Activity    Alcohol use: No    Drug use: Not Currently     Types: Methamphetamines, Marijuana     Comment: Not used since 1997    Sexual activity: Yes     Partners: Male   Lifestyle    Physical activity     Days per week: None     Minutes per session: None    Stress: None   Relationships    Social connections     Talks on phone: None     Gets together: None     Attends Synagogue service: None     Active member of club or organization: None     Attends meetings of clubs or organizations: None     Relationship status: None    Intimate partner violence     Fear of current or ex partner: None     Emotionally abused: None     Physically abused: None     Forced sexual activity: None   Other Topics Concern    None   Social History Narrative    None         Hypothyroidism  She feels she is losing her hair. Occasional diarrhea since her rotator cuff surgery. She has been changing her diet. Denies heat or cold intolerance. Denies changes to skin, nails    Essential hypertension  Stable. Patient denies any exertional chest pain, dyspnea, palpitations, syncope, orthopnea, edema or paroxysmal nocturnal dyspnea. She does have some pain in her chest since she was hospitalized. She was seen at ED and had work up and was diagnose with pneumonia. It has improved since she was ill. Now only has issues when she is out in the cold breathing in cold air. Current smoker  Down to three cigarettes a day. Hyperlipidemia LDL goal <130  The 10-year ASCVD risk score (Jarett Silveira, et al., 2013) is: 8.5%    Values used to calculate the score:      Age: 54 years      Sex: Female      Is Non- : No      Diabetic: No      Tobacco smoker: Yes      Systolic Blood Pressure: 228 mmHg      Is BP treated: Yes      HDL Cholesterol: 38 mg/dL      Total Cholesterol: 163 mg/dL         Review of Systems   Constitutional: Negative for appetite change, chills, fatigue and unexpected weight change. Respiratory: Negative for cough, chest tightness, shortness of breath and wheezing. Cardiovascular: Negative for chest pain, palpitations and leg swelling. Gastrointestinal: Negative for abdominal pain, constipation, diarrhea, nausea and vomiting. Musculoskeletal: Negative for arthralgias. Skin: Positive for rash. Neurological: Negative for weakness, numbness and headaches. Hematological: Negative for adenopathy. OBJECTIVE:    /80 (Site: Left Upper Arm, Position: Sitting, Cuff Size: Large Adult)   Pulse 87   Temp 97.9 °F (36.6 °C)   Resp 18   Wt 218 lb (98.9 kg)   SpO2 96%   BMI 35.19 kg/m²     Physical Exam  Constitutional:       Appearance: Normal appearance. She is well-developed. HENT:      Head: Normocephalic and atraumatic. Right Ear: Hearing normal.      Left Ear: Hearing normal.   Neck:      Musculoskeletal: Normal range of motion and neck supple. Thyroid: No thyromegaly. Vascular: No carotid bruit or JVD. Cardiovascular:      Rate and Rhythm: Normal rate and regular rhythm. Heart sounds: Normal heart sounds. No murmur. No friction rub. No gallop. Pulmonary:      Effort: Pulmonary effort is normal. No respiratory distress. Breath sounds: Normal breath sounds. No wheezing, rhonchi or rales. Abdominal:      General: Bowel sounds are normal.      Palpations: Abdomen is soft. Musculoskeletal: Normal range of motion. Skin:     General: Skin is warm and dry. Comments: Lateral left breast with mildly erythematous, dry area   Neurological:      General: No focal deficit present. Mental Status: She is alert and oriented to person, place, and time. Psychiatric:         Mood and Affect: Mood normal.         Behavior: Behavior normal. Behavior is cooperative. Thought Content: Thought content normal.         ASSESSMENT/PLAN:    1. Encounter for screening mammogram for breast cancer  Mammogram a little over a year ago--no h/o of abnormal. No insurance right now and not able to afford mammogram    2. Encounter for screening for diabetes mellitus    3. Need for prophylactic vaccination and inoculation against varicella  Declined at this time due to lack of insurance    4. Essential hypertension  Refilled. Labs as ordered--plans to complete through [facility]  - lisinopril (PRINIVIL;ZESTRIL) 10 MG tablet; Take 1 tablet by mouth daily  Dispense: 90 tablet; Refill: 1  - CBC Auto Differential; Future  - Comprehensive Metabolic Panel; Future    5. Acquired hypothyroidism  Refilled. Labs through [facility]  - levothyroxine (SYNTHROID) 100 MCG tablet; Take 1 tablet by mouth Daily  Dispense: 90 tablet; Refill: 1  - TSH with Reflex; Future    6. Hyperlipidemia LDL goal <130  - ezetimibe (ZETIA) 10 MG tablet; Take 1 tablet by mouth daily  Dispense: 90 tablet; Refill: 1    7. Current smoker  Counseled on cessation    8. H/O repair of right rotator cuff  Complete PT    9. Rash and nonspecific skin eruption  Emollient to affected area. Follow up if not improving. Discussed pap, colonoscopy once able           Return in about 6 months (around 8/18/2021), or if symptoms worsen or fail to improve.       (Please note that portions of this note may have been completed with a voice recognition program. Efforts were made to edit the dictations but occasionally words aremis-transcribed.)

## 2021-02-18 NOTE — ASSESSMENT & PLAN NOTE
She feels she is losing her hair. Occasional diarrhea since her rotator cuff surgery. She has been changing her diet. Denies heat or cold intolerance.  Denies changes to skin, nails

## 2021-02-18 NOTE — ASSESSMENT & PLAN NOTE
The 10-year ASCVD risk score (Amy Marshall et al., 2013) is: 8.5%    Values used to calculate the score:      Age: 54 years      Sex: Female      Is Non- : No      Diabetic: No      Tobacco smoker: Yes      Systolic Blood Pressure: 094 mmHg      Is BP treated: Yes      HDL Cholesterol: 38 mg/dL      Total Cholesterol: 163 mg/dL

## 2021-02-19 ENCOUNTER — HOSPITAL ENCOUNTER (OUTPATIENT)
Dept: PHYSICAL THERAPY | Age: 56
Setting detail: THERAPIES SERIES
Discharge: HOME | End: 2021-02-19
Payer: COMMERCIAL

## 2021-02-19 LAB
ALBUMIN SERPL-MCNC: 4.2 G/DL
ALP BLD-CCNC: 73 U/L
ALT SERPL-CCNC: 28 U/L
ANION GAP SERPL CALCULATED.3IONS-SCNC: 1.2 MMOL/L
AST SERPL-CCNC: 24 U/L
BASOPHILS ABSOLUTE: 0.1 /ΜL
BASOPHILS RELATIVE PERCENT: 0.9 %
BILIRUB SERPL-MCNC: 0.3 MG/DL (ref 0.1–1.4)
BUN BLDV-MCNC: 8 MG/DL
CALCIUM SERPL-MCNC: 9.4 MG/DL
CHLORIDE BLD-SCNC: 103 MMOL/L
CO2: 22 MMOL/L
CREAT SERPL-MCNC: 0.9 MG/DL
EOSINOPHILS ABSOLUTE: 0.3 /ΜL
EOSINOPHILS RELATIVE PERCENT: 4 %
GFR CALCULATED: 72
GLUCOSE BLD-MCNC: 158 MG/DL
HCT VFR BLD CALC: 44.5 % (ref 36–46)
HEMOGLOBIN: 15 G/DL (ref 12–16)
LYMPHOCYTES ABSOLUTE: 3.1 /ΜL
LYMPHOCYTES RELATIVE PERCENT: 41 %
MCH RBC QN AUTO: 29.6 PG
MCHC RBC AUTO-ENTMCNC: 33.6 G/DL
MCV RBC AUTO: 88.1 FL
MONOCYTES ABSOLUTE: 0.4 /ΜL
MONOCYTES RELATIVE PERCENT: 5.1 %
NEUTROPHILS ABSOLUTE: 3.7 /ΜL
NEUTROPHILS RELATIVE PERCENT: 49 %
PDW BLD-RTO: 13.4 %
PLATELET # BLD: 218 K/ΜL
PMV BLD AUTO: NORMAL FL
POTASSIUM SERPL-SCNC: 4.5 MMOL/L
RBC # BLD: 50.05 10^6/ΜL
SODIUM BLD-SCNC: 139 MMOL/L
TOTAL PROTEIN: 7.6
TSH SERPL DL<=0.05 MIU/L-ACNC: 2.39 UIU/ML
WBC # BLD: 7.6 10^3/ML

## 2021-02-19 PROCEDURE — 97140 MANUAL THERAPY 1/> REGIONS: CPT

## 2021-02-19 PROCEDURE — 97110 THERAPEUTIC EXERCISES: CPT

## 2021-02-19 PROCEDURE — 97016 VASOPNEUMATIC DEVICE THERAPY: CPT

## 2021-02-19 NOTE — FLOWSHEET NOTE
Outpatient Physical Therapy  [facility]           [x] Phone: [phone]   Fax: [phone]  [place]           [] Phone: [phone]   Fax: [phone]        Physical Therapy Daily Treatment Note  Date:  2021    Patient Name:  Aisha Hurt    :  1965  MRN: [id number]  Restrictions/Precautions:  Other position/activity restrictions: as of - no AROM R shoulder  Diagnosis:   Diagnosis: R RC repair 20  Date of Injury/Surgery:   Treatment Diagnosis: Treatment Diagnosis: R shoulder pain/R shoulder stiffness/ impaired mobility    Insurance/Certification information: PT Insurance Information: [facility] x 12 sessions- expires 3/5/21   Referring Physician:  Referring Practitioner: Gonzales Mccloud  Next Doctor Visit:    Plan of care signed (Y/N):    Outcome Measure:  quick DASH 52/55  Visit# / total visits: 6/10 then PN  Pain level: 8/10 R shoulder   Goals:          Long term goals  Time Frame for Long term goals : 12 weeks  Long term goal 1: patient's goal - regain R shoulder function and return to work  Long term goal 2: patientmwill score 33/55 on Quick DASH indicating improved R UE function with ADL/IADL and or less R UE pain symptoms- initial score = 52/55  Long term goal 3: patient will have 120* of FLEX AROM in an upright position inorder to order for ability to reach above shoulder height for ADL/IADL function  Long term goal 4: patient will be independent with HEP in order to be prepared for discharge    Summary of Evaluation  ASSESSMENT  Patient primary complaints:  R shoulder pain/R shoulder stiffness/ impaired mobility    History of condition:R RC repair 20; - no prior surgeries; sleeping in recliner; out of pain meds- has called surgeon; performing pendulums 2-3 day  Current functional limitations: in sling - requires assist for dressing/ and all IADL  Clinical findings: quick DASH 52/55; PROM R Shoulder Flex  0-180: supine 75* limited by pain; R Shoulder ABduction 0-180: supine 70* limited by pain; R Shoulder Int Rotation  0-70: supine shoulder @ 45* ABD= 25* IR; R Shoulder Ext Rotation  0-90: supine shoulder @ 45* ABD= 15* ER; Cervical: L ROT AROM, SB to R/L all pull in R UT  PLOF:employed as - independent with all necessary ADL/IADL- function was limited/slowed by R RC tear/pain  Skilled PT interventions are intended to: Address ROM deficits which will enable patient  to return to PLOF/employment  Patient agrees with established plan of care and assisted in the development of their  goals  Barriers to learning:none- no mental/cognitive barriers observed  Preferred learning style(s):   written- demonstration -practice  Preferred Language: English  Potential barriers to progress:none  The patient appears motivated to participate in PT and regain PLOF: yes    Subjective:  Difficulty sleeping, sleeping in recliner. Any changes in Ambulatory Summary Sheet? None        Objective: R shoulder ER PROM in supine 38*     Prior to today's treatment session, patient was screened for signs and symptoms related to COVID-19 including but not limited to verbally answering questions related to feeling ill, cough, or SOB, along with taking temperature via forehead thermometer. Patient presented with all negative signs and symptoms and had no fever >100 degrees Fahrenheit this date.          Exercises: (No more than 4 columns)   Exercise/Equipment 2/17/21 2/19/2021          WARM UP        pendulum ---    UBE x 2' 2' Fwd   TABLE     Seated scap sets 1 x10  15x3\"   Supine shoulder PROM FLEX, ABD and ER/IR @75* ABD FLEX, ABD and ER/IR @75* ABD             Supine shoulder AAROM Holding towel - FLEXION  x15 reps Holding towel - FLEXION  15x3\"   STANDING     Counter top bows 5 ct x 10 10x5\"   ER with cane  x10 3\" hold @ wall 15x3\" at wall   Supine AROM Circles @ 90* FLEX Circles @ 90* FLEX cw/ccw 10x                            PROPRIOCEPTION                              MODALITIES See below Other Therapeutic Activities/Education:        Home Exercise Program:  Pendulum/ elbow AROM/neck AROM      Manual Treatments:  Supine PROM;  Modalities:  Patient received vasocompression on their R shoulder  for pain and inflammation for 10 min on low pressure. Patient had negative skin reaction afterwards. Communication with other providers:        Assessment:  (Response towards treatment session) (Pain Rating)  Pain complaints after session, 8/10,patient demonstrating increase R shoulder ER ROM since starting PT. Pt would continue to benefit from skilled therapy interventions to address remaining impairments, improve mobility and strength,  and progress toward goal completion and prepare for d/c including finalizing HEP ;  while reducing risk for re-injury or further decline. Plan for Next Session:  focus on PROM R shoulder and scapular strength/mobility. Continue per Protocol.        Time In / Time Out:    0928/1025       If [facility] Please Indicate Time In/Out/Total Time  CPT Code Time in Time out Total Time   exercises   0928   1007   39   manual  1007  1015    8   vaso  1015  1025    10                                 Total for session              Timed Code/Total Treatment Minutes:   62  39te 8man 10vaso      Next Progress Note due:        Plan of Care Interventions:  [x] Therapeutic Exercise  [] Modalities:  [x] Therapeutic Activity     [] Ultrasound  [] Estim  [] Gait Training      [] Cervical Traction [] Lumbar Traction  [x] Neuromuscular Re-education    [] Cold/hotpack [] Iontophoresis   [x] Instruction in HEP      [x] Vasopneumatic   [] Dry Needling    [x] Manual Therapy               [] Aquatic Therapy              Electronically signed by:  Luna Whittaker  2/19/2021, 9:28 AM

## 2021-02-23 ENCOUNTER — HOSPITAL ENCOUNTER (OUTPATIENT)
Dept: PHYSICAL THERAPY | Age: 56
Setting detail: THERAPIES SERIES
Discharge: HOME | End: 2021-02-23
Payer: COMMERCIAL

## 2021-02-23 PROCEDURE — 97110 THERAPEUTIC EXERCISES: CPT

## 2021-02-23 PROCEDURE — 97140 MANUAL THERAPY 1/> REGIONS: CPT

## 2021-02-23 PROCEDURE — 97016 VASOPNEUMATIC DEVICE THERAPY: CPT

## 2021-02-23 NOTE — FLOWSHEET NOTE
Outpatient Physical Therapy  [facility]           [x] Phone: [phone]   Fax: [phone]  [facility]           [] Phone: [phone]   Fax: [phone]        Physical Therapy Daily Treatment Note  Date:  2021    Patient Name:  Flex Patient    :  1965  MRN: [id number]  Restrictions/Precautions:  Other position/activity restrictions: as of - no AROM R shoulder  Diagnosis:   Diagnosis: R RC repair 20  Date of Injury/Surgery:   Treatment Diagnosis: Treatment Diagnosis: R shoulder pain/R shoulder stiffness/ impaired mobility    Insurance/Certification information: PT Insurance Information: [facility] x 12 sessions- expires 3/5/21   Referring Physician:  Referring Practitioner: Sridhar Rosales Doctor Visit:    Plan of care signed (Y/N):    Outcome Measure:  quick DASH 52/  Visit# / total visits: 7/10 then PN  Pain level: 7/10 R shoulder   Goals:          Long term goals  Time Frame for Long term goals : 12 weeks  Long term goal 1: patient's goal - regain R shoulder function and return to work  Long term goal 2: patientmwill score 33/55 on Quick DASH indicating improved R UE function with ADL/IADL and or less R UE pain symptoms- initial score = 52/55  Long term goal 3: patient will have 120* of FLEX AROM in an upright position inorder to order for ability to reach above shoulder height for ADL/IADL function  Long term goal 4: patient will be independent with HEP in order to be prepared for discharge    Summary of Evaluation  ASSESSMENT  Patient primary complaints:  R shoulder pain/R shoulder stiffness/ impaired mobility    History of condition:R RC repair 20; - no prior surgeries; sleeping in recliner; out of pain meds- has called surgeon; performing pendulums 2-3 day  Current functional limitations: in sling - requires assist for dressing/ and all IADL  Clinical findings: quick DASH 52/55; PROM R Shoulder Flex  0-180: supine 75* limited by pain; R Shoulder ABduction 0-180: supine 70* limited by pain; R Shoulder Int Rotation  0-70: supine shoulder @ 45* ABD= 25* IR; R Shoulder Ext Rotation  0-90: supine shoulder @ 45* ABD= 15* ER; Cervical: L ROT AROM, SB to R/L all pull in R UT  PLOF:employed as - independent with all necessary ADL/IADL- function was limited/slowed by R RC tear/pain  Skilled PT interventions are intended to: Address ROM deficits which will enable patient  to return to PLOF/employment  Patient agrees with established plan of care and assisted in the development of their  goals  Barriers to learning:none- no mental/cognitive barriers observed  Preferred learning style(s):   written- demonstration -practice  Preferred Language: English  Potential barriers to progress:none  The patient appears motivated to participate in PT and regain PLOF: yes    Subjective:  Patient rates her shoulder pain 7/10 today. States that she is having a lot of pain in the bone. Still sleeping in the recliner and only getting 5 hours at a time. Any changes in Ambulatory Summary Sheet? None        Objective: Painful arc noted with shoulder flexion PROM from ~ 105*-110*  Prior to today's treatment session, patient was screened for signs and symptoms related to COVID-19 including but not limited to verbally answering questions related to feeling ill, cough, or SOB, along with taking temperature via forehead thermometer. Patient presented with all negative signs and symptoms and had no fever >100 degrees Fahrenheit this date.          Exercises: (No more than 4 columns)   Exercise/Equipment 2/17/21 2/19/2021 2/23/2021           WARM UP         pendulum ---     UBE x 2' 2' Fwd 2' Fwd   TABLE      Seated scap sets 1 x10  15x3\" 15x3\"   Supine shoulder PROM FLEX, ABD and ER/IR @75* ABD FLEX, ABD and ER/IR @75* ABD FLEX, ABD and ER/IR @75* ABD              Supine shoulder AAROM Holding towel - FLEXION  x15 reps Holding towel - FLEXION  15x3\" Holding towel - FLEXION  15x3\"   STANDING      Counter

## 2021-02-26 ENCOUNTER — HOSPITAL ENCOUNTER (OUTPATIENT)
Dept: PHYSICAL THERAPY | Age: 56
Setting detail: THERAPIES SERIES
Discharge: HOME | End: 2021-02-26
Payer: COMMERCIAL

## 2021-02-26 PROCEDURE — 97110 THERAPEUTIC EXERCISES: CPT

## 2021-02-26 PROCEDURE — 97016 VASOPNEUMATIC DEVICE THERAPY: CPT

## 2021-02-26 NOTE — FLOWSHEET NOTE
Outpatient Physical Therapy  [facility]           [x] Phone: [phone]   Fax: [phone]  [place]           [] Phone: [phone]   Fax: [phone]        Physical Therapy Daily Treatment Note  Date:  2021    Patient Name:  Aisha Hurt    :  1965  MRN: [id number]  Restrictions/Precautions:  Other position/activity restrictions: as of - no AROM R shoulder  Diagnosis:   Diagnosis: R RC repair 20  Date of Injury/Surgery:   Treatment Diagnosis: Treatment Diagnosis: R shoulder pain/R shoulder stiffness/ impaired mobility    Insurance/Certification information: PT Insurance Information: [facility] x 12 sessions- expires 3/5/21   Referring Physician:  Referring Practitioner: Gonzales Mccloud  Next Doctor Visit:    Plan of care signed (Y/N):    Outcome Measure:  quick DASH 52/  Visit# / total visits: 8/10 then PN  Pain level: 7/10 R shoulder   Goals:          Long term goals  Time Frame for Long term goals : 12 weeks  Long term goal 1: patient's goal - regain R shoulder function and return to work  Long term goal 2: patientmwill score 33/55 on Quick DASH indicating improved R UE function with ADL/IADL and or less R UE pain symptoms- initial score = 52/55  Long term goal 3: patient will have 120* of FLEX AROM in an upright position inorder to order for ability to reach above shoulder height for ADL/IADL function  Long term goal 4: patient will be independent with HEP in order to be prepared for discharge    Summary of Evaluation  ASSESSMENT  Patient primary complaints:  R shoulder pain/R shoulder stiffness/ impaired mobility    History of condition:R RC repair 20; - no prior surgeries; sleeping in recliner; out of pain meds- has called surgeon; performing pendulums 2-3 day  Current functional limitations: in sling - requires assist for dressing/ and all IADL  Clinical findings: quick DASH 52/55; PROM R Shoulder Flex  0-180: supine 75* limited by pain; R Shoulder ABduction 0-180: supine 70* limited by pain; R Shoulder Int Rotation  0-70: supine shoulder @ 45* ABD= 25* IR; R Shoulder Ext Rotation  0-90: supine shoulder @ 45* ABD= 15* ER; Cervical: L ROT AROM, SB to R/L all pull in R UT  PLOF:employed as - independent with all necessary ADL/IADL- function was limited/slowed by R RC tear/pain  Skilled PT interventions are intended to: Address ROM deficits which will enable patient  to return to PLOF/employment  Patient agrees with established plan of care and assisted in the development of their  goals  Barriers to learning:none- no mental/cognitive barriers observed  Preferred learning style(s):   written- demonstration -practice  Preferred Language: English  Potential barriers to progress:none  The patient appears motivated to participate in PT and regain PLOF: yes    Subjective:  Patient rates her shoulder pain 7/10 today, and reports of bruising on the shld after her last treatment. Any changes in Ambulatory Summary Sheet? None        Objective: Increased pain with new exs added  Prior to today's treatment session, patient was screened for signs and symptoms related to COVID-19 including but not limited to verbally answering questions related to feeling ill, cough, or SOB, along with taking temperature via forehead thermometer. Patient presented with all negative signs and symptoms and had no fever >100 degrees Fahrenheit this date.          Exercises: (No more than 4 columns)   Exercise/Equipment 2/19/2021 2/23/2021 2/26/2021           WARM UP         pendulum      UBE 2' Fwd 2' Fwd 2' Fwd   TABLE      Seated scap sets 15x3\" 15x3\" 15x3\"   Supine shoulder PROM FLEX, ABD and ER/IR @75* ABD FLEX, ABD and ER/IR @75* ABD Next visit             Supine shoulder AAROM Holding towel - FLEXION  15x3\" Holding towel - FLEXION  15x3\" Holding towel - FLEXION  15x3\"   STANDING      Counter top bows 10x5\" 10x5\"  10x5\"   ER with cane  15x3\" at wall 15x3\" at wall 15x3\" at wall   Supine AROM Circles @ 90* FLEX cw/ccw 10x Circles @ 90* FLEX cw/ccw 10x Circles @ 90* FLEX cw/ccw 10x   Supine ABC's   1x   Supine punch/Flex   5x   Core wheel slant    10x3\"  Flex/Scap              PROPRIOCEPTION                                    MODALITIES  See below vaso                   Other Therapeutic Activities/Education:        Home Exercise Program:  Patient to add towel flexion and circles at 90* to HEP      Manual Treatments:  Supine PROM; next visit    Modalities:  Patient received vasocompression on their R shoulder  for pain and inflammation for 10 min on low pressure. Patient had negative skin reaction afterwards. Communication with other providers:        Assessment:  (Response towards treatment session) (Pain Rating)  Patient rated her shoulder pain 9/10 after exercises, stretching and vaso. Noted a spasm in the shoulder with ER stretching. Max verbal cues provided with stretching to relax and breathe      Plan for Next Session:  focus on PROM R shoulder and scapular strength/mobility. Continue per Protocol.        Time In / Time Out:  1035/1130     If [facility] Please Indicate Time In/Out/Total Time  CPT Code Time in Time out Total Time   exercises   1035 1120   45   manual           vaso  1120  1130   10                                 Total for session       55         Timed Code/Total Treatment Minutes:   55 10vaso  45te      Next Progress Note due:        Plan of Care Interventions:  [x] Therapeutic Exercise  [] Modalities:  [x] Therapeutic Activity     [] Ultrasound  [] Estim  [] Gait Training      [] Cervical Traction [] Lumbar Traction  [x] Neuromuscular Re-education    [] Cold/hotpack [] Iontophoresis   [x] Instruction in HEP      [x] Vasopneumatic   [] Dry Needling    [x] Manual Therapy               [] Aquatic Therapy              Electronically signed by:  Memo Pierre  2/26/2021, 10:35 AM

## 2021-03-02 ENCOUNTER — HOSPITAL ENCOUNTER (OUTPATIENT)
Dept: PHYSICAL THERAPY | Age: 56
Setting detail: THERAPIES SERIES
Discharge: HOME | End: 2021-03-02
Payer: COMMERCIAL

## 2021-03-02 PROCEDURE — 97016 VASOPNEUMATIC DEVICE THERAPY: CPT

## 2021-03-02 PROCEDURE — 97140 MANUAL THERAPY 1/> REGIONS: CPT

## 2021-03-02 PROCEDURE — 97110 THERAPEUTIC EXERCISES: CPT

## 2021-03-02 NOTE — FLOWSHEET NOTE
Outpatient Physical Therapy  [facility]           [x] Phone: [phone]   Fax: [phone]  [place]           [] Phone: [phone]   Fax: [phone]        Physical Therapy Daily Treatment Note  Date:  3/2/2021    Patient Name:  Bran Edward    :  1965  MRN: [id number]  Restrictions/Precautions:  Other position/activity restrictions: as of - no AROM R shoulder  Diagnosis:   Diagnosis: R RC repair 20  Date of Injury/Surgery:   Treatment Diagnosis: Treatment Diagnosis: R shoulder pain/R shoulder stiffness/ impaired mobility    Insurance/Certification information: PT Insurance Information: [facility] x 12 sessions- expires 3/5/21   Referring Physician:  Referring Practitioner: Severo Calamity  Next Doctor Visit:    Plan of care signed (Y/N):    Outcome Measure:  quick DASH 52/55  Visit# / total visits: 9/10 then PN  Pain level: 7/10 R shoulder   Goals:          Long term goals  Time Frame for Long term goals : 12 weeks  Long term goal 1: patient's goal - regain R shoulder function and return to work  Long term goal 2: patientmwill score 33/55 on Quick DASH indicating improved R UE function with ADL/IADL and or less R UE pain symptoms- initial score = 52/55  Long term goal 3: patient will have 120* of FLEX AROM in an upright position inorder to order for ability to reach above shoulder height for ADL/IADL function  Long term goal 4: patient will be independent with HEP in order to be prepared for discharge    Summary of Evaluation  ASSESSMENT  Patient primary complaints:  R shoulder pain/R shoulder stiffness/ impaired mobility    History of condition:R RC repair 20; - no prior surgeries; sleeping in recliner; out of pain meds- has called surgeon; performing pendulums 2-3 day  Current functional limitations: in sling - requires assist for dressing/ and all IADL  Clinical findings: quick DASH 52/55; PROM R Shoulder Flex  0-180: supine 75* limited by pain; R Shoulder ABduction 0-180: supine 70* limited by pain; R Shoulder Int Rotation  0-70: supine shoulder @ 45* ABD= 25* IR; R Shoulder Ext Rotation  0-90: supine shoulder @ 45* ABD= 15* ER; Cervical: L ROT AROM, SB to R/L all pull in R UT  PLOF:employed as - independent with all necessary ADL/IADL- function was limited/slowed by R RC tear/pain  Skilled PT interventions are intended to: Address ROM deficits which will enable patient  to return to PLOF/employment  Patient agrees with established plan of care and assisted in the development of their  goals  Barriers to learning:none- no mental/cognitive barriers observed  Preferred learning style(s):   written- demonstration -practice  Preferred Language: English  Potential barriers to progress:none  The patient appears motivated to participate in PT and regain PLOF: yes    Subjective:  Patient rates her shoulder pain 7/10 today. Any changes in Ambulatory Summary Sheet? None        Objective: Patient continues to have difficulty relaxing with PROM  Prior to today's treatment session, patient was screened for signs and symptoms related to COVID-19 including but not limited to verbally answering questions related to feeling ill, cough, or SOB, along with taking temperature via forehead thermometer. Patient presented with all negative signs and symptoms and had no fever >100 degrees Fahrenheit this date.          Exercises: (No more than 4 columns)   Exercise/Equipment 2/19/2021 2/23/2021 2/26/2021 3/2/2021            WARM UP          pendulum       UBE 2' Fwd 2' Fwd 2' Fwd 3' Fwd   TABLE       Seated scap sets 15x3\" 15x3\" 15x3\" 15x3\"   Supine shoulder PROM FLEX, ABD and ER/IR @75* ABD FLEX, ABD and ER/IR @75* ABD Next visit FLEX, ABD and ER/IR @75* ABD              Supine shoulder AAROM Holding towel - FLEXION  15x3\" Holding towel - FLEXION  15x3\" Holding towel - FLEXION  15x3\" Holding towel - FLEXION  15x3\"   STANDING       Counter top bows 10x5\" 10x5\"  10x5\" 10x5\"   ER with cane  15x3\" at wall 15x3\" at wall 15x3\" at wall 15x3\" at wall   Supine AROM Circles @ 90* FLEX cw/ccw 10x Circles @ 90* FLEX cw/ccw 10x Circles @ 90* FLEX cw/ccw 10x Circles @ 90* FLEX cw/ccw 10x   Supine ABC's   1x 1x   Supine punch/Flex   5x 5x   Core wheel slant    10x3\"  Flex/Scap 10x3\"               PROPRIOCEPTION                                          MODALITIES  See below vaso vaso                     Other Therapeutic Activities/Education:        Home Exercise Program:  Patient to add towel flexion and circles at 90* to HEP      Manual Treatments:  Supine PROM; next visit    Modalities:  Patient received vasocompression on their R shoulder  for pain and inflammation for 10 min on low pressure. Patient had negative skin reaction afterwards. Communication with other providers:        Assessment:  (Response towards treatment session) (Pain Rating)  Patient rated her shoulder pain 9/10 after exercises, stretching and vaso. Plan for Next Session:  focus on PROM R shoulder and scapular strength/mobility. Continue per Protocol.        Time In / Time Out:  1000/1100     If [facility] Please Indicate Time In/Out/Total Time  CPT Code Time in Time out Total Time   exercises   1000 1035 35   manual  1035 1050   15   vaso  1050 1100  10                                 Total for session       60       Timed Code/Total Treatment Minutes:  61' (35' TE, 15' MT, 10' Vaso)      Next Progress Note due:        Plan of Care Interventions:  [x] Therapeutic Exercise  [] Modalities:  [x] Therapeutic Activity     [] Ultrasound  [] Estim  [] Gait Training      [] Cervical Traction [] Lumbar Traction  [x] Neuromuscular Re-education    [] Cold/hotpack [] Iontophoresis   [x] Instruction in HEP      [x] Vasopneumatic   [] Dry Needling    [x] Manual Therapy               [] Aquatic Therapy              Electronically signed by:  Femi Selby  3/2/2021, 9:53 AM

## 2021-03-05 ENCOUNTER — HOSPITAL ENCOUNTER (OUTPATIENT)
Dept: PHYSICAL THERAPY | Age: 56
Setting detail: THERAPIES SERIES
Discharge: HOME | End: 2021-03-05
Payer: COMMERCIAL

## 2021-03-05 PROCEDURE — 97110 THERAPEUTIC EXERCISES: CPT

## 2021-03-05 PROCEDURE — 97140 MANUAL THERAPY 1/> REGIONS: CPT

## 2021-03-05 PROCEDURE — 97016 VASOPNEUMATIC DEVICE THERAPY: CPT

## 2021-03-05 NOTE — PROGRESS NOTES
Outpatient Physical Therapy           [place]           [] Phone: [phone]   Fax: [phone]  [place]           [x] Phone: [phone]   Fax: [phone]       Referring Practitioner: Aldair Azul                                        From: Fallon Mdoi PT             Patient: Denise Ruvalcaba                                                              : 1965  Diagnosis: Diagnosis: R RC repair 20              Treatment Diagnosis: Treatment Diagnosis: R shoulder pain/R shoulder stiffness/ impaired mobility   [x]  Progress Note                []  Discharge Note    Evaluation Date:  21   Total Visits to date:   8 Cancels/No-shows to date:      Subjective:  Patient reports of 7/10 pain upon arrival and reports she did too much at her last treatment      Plan of Care/Treatment to date:  [x] Therapeutic Exercise    [] Modalities:  [x] Therapeutic Activity     [] Ultrasound  [] Electrical Stimulation  [] Gait Training      [] Cervical Traction   [] Lumbar Traction  [x] Neuromuscular Re-education  [] Cold/hotpack [] Iontophoresis  [x] Instruction in HEP      Other:  [x] Manual Therapy       [x]  Vasopneumatic  [] Aquatic Therapy       []   Dry Needle Therapy                      Objective/Significant Findings At Last Visit/Comments:   Patient continues to have difficulty relaxing with PROM    Flex 140*  *  PROM      Assessment: R shoulder PROM has improved since evaluation  R Shoulder Flex  Supine was  75* limited by pain now 140*; R Shoulder ABduction supine was 70* limited by pain now 123*      Goal Status:  [] Achieved [] Partially Achieved  [x] Not Achieved   Long term goal 1: patient's goal - regain R shoulder function and return to work  Long term goal 2: patientmwill score 33/55 on Quick DASH indicating improved R UE function with ADL/IADL and or less R UE pain symptoms- initial score = 52/55  Long term goal 3: patient will have 120* of FLEX AROM in an upright position inorder to order for ability to reach above shoulder height for ADL/IADL function  Long term goal 4: patient will be independent with HEP in order to be prepared for discharge          Rehab Potential: [] Excellent [] Good [] Fair  [] Poor         Patient Status: [x] Continue per initial plan of Care     [] Patient now discharged     [] Additional visits requested, Please re-certify for additional visits:      Requested frequency/duration: /week for weeks    If we are requesting more visits, we fully anticipate the patient's condition is expected to improve within the treatment timeframe we are requesting. Electronically signed by:  Elyse Triplett PT, 3/5/2021, 12:11 PM    If you have any questions or concerns, please don't hesitate to call.   Thank you for your referral.    Physician Signature:______________________ Date:______ Time: ________  By signing above, therapists plan is approved by physician

## 2021-03-05 NOTE — FLOWSHEET NOTE
wall 15x3\" at wall   Supine AROM Circles @ 90* FLEX cw/ccw 10x Circles @ 90* FLEX cw/ccw 10x Circles @ 90* FLEX cw/ccw 10x   Supine ABC's 1x 1x 1x   Supine punch/Flex 5x 5x 5x   Core wheel slant  10x3\"  Flex/Scap 10x3\" 10x3\"              PROPRIOCEPTION                                    MODALITIES vaso vaso vaso                   Other Therapeutic Activities/Education:        Home Exercise Program:  Patient to add towel flexion and circles at 90* to HEP      Manual Treatments:  Supine PROM; next visit    Modalities:  Patient received vasocompression on their R shoulder  for pain and inflammation for 10 min on low pressure. Patient had negative skin reaction afterwards. Communication with other providers:        Assessment:  (Response towards treatment session) (Pain Rating)  Patient rated her shoulder pain 9/10 after exercises, stretching and vaso. Plan for Next Session:  focus on PROM R shoulder and scapular strength/mobility. Continue per Protocol.        Time In / Time Out:   1022/1125     If [facility] Please Indicate Time In/Out/Total Time  CPT Code Time in Time out Total Time   exercises   1022 1100  38    manual  1100  1115     15   vaso  1115  1125    10                                 Total for session       63       Timed Code/Total Treatment Minutes:   63     Next Progress Note due:        Plan of Care Interventions:  [x] Therapeutic Exercise  [] Modalities:  [x] Therapeutic Activity     [] Ultrasound  [] Estim  [] Gait Training      [] Cervical Traction [] Lumbar Traction  [x] Neuromuscular Re-education    [] Cold/hotpack [] Iontophoresis   [x] Instruction in HEP      [x] Vasopneumatic   [] Dry Needling    [x] Manual Therapy               [] Aquatic Therapy              Electronically signed by:  Radha Turpin  3/5/2021, 10:22 AM

## 2021-03-09 ENCOUNTER — HOSPITAL ENCOUNTER (OUTPATIENT)
Dept: PHYSICAL THERAPY | Age: 56
Setting detail: THERAPIES SERIES
Discharge: HOME | End: 2021-03-09
Payer: COMMERCIAL

## 2021-03-09 PROCEDURE — 97140 MANUAL THERAPY 1/> REGIONS: CPT

## 2021-03-09 PROCEDURE — 97110 THERAPEUTIC EXERCISES: CPT

## 2021-03-09 NOTE — FLOWSHEET NOTE
Spoke with Juan A SCALES at [facility] and she verbally extended Mindy's end date on her original C-9 to 03/12/2021. She will fax over written approval by end of today.

## 2021-03-09 NOTE — FLOWSHEET NOTE
Outpatient Physical Therapy  [facility]           [x] Phone: [phone]   Fax: [phone]  [facility]           [] Phone: [phone]   Fax: [phone]        Physical Therapy Daily Treatment Note  Date:  3/9/2021    Patient Name:  Aylin Vazquez    :  1965  MRN: [id number]  Restrictions/Precautions:  Other position/activity restrictions: as of - no AROM R shoulder  Diagnosis:   Diagnosis: R RC repair 20  Date of Injury/Surgery:   Treatment Diagnosis: Treatment Diagnosis: R shoulder pain/R shoulder stiffness/ impaired mobility    Insurance/Certification information: PT Insurance Information: [facility] x 12 sessions- expires 3/5/21   Referring Physician:  Referring Practitioner: Hesham Beckman  Next Doctor Visit:    Plan of care signed (Y):    Outcome Measure:  quick DASH 52/55  Visit# / total visits: then PN        Pain level: 7/10 R shoulder   Goals:          Long term goals  Time Frame for Long term goals : 12 weeks  Long term goal 1: patient's goal - regain R shoulder function and return to work  Long term goal 2: patientmwill score 33/55 on Quick DASH indicating improved R UE function with ADL/IADL and or less R UE pain symptoms- initial score = 52/55  Long term goal 3: patient will have 120* of FLEX AROM in an upright position inorder to order for ability to reach above shoulder height for ADL/IADL function  Long term goal 4: patient will be independent with HEP in order to be prepared for discharge    Summary of Evaluation  ASSESSMENT  Patient primary complaints:  R shoulder pain/R shoulder stiffness/ impaired mobility    History of condition:R RC repair 20; - no prior surgeries; sleeping in recliner; out of pain meds- has called surgeon; performing pendulums 2-3 day  Current functional limitations: in sling - requires assist for dressing/ and all IADL  Clinical findings: quick DASH 52/55; PROM R Shoulder Flex  0-180: supine 75* limited by pain; R Shoulder ABduction 0-180: supine 70* limited by pain; R Shoulder Int Rotation  0-70: supine shoulder @ 45* ABD= 25* IR; R Shoulder Ext Rotation  0-90: supine shoulder @ 45* ABD= 15* ER; Cervical: L ROT AROM, SB to R/L all pull in R UT  PLOF:employed as - independent with all necessary ADL/IADL- function was limited/slowed by R RC tear/pain  Skilled PT interventions are intended to: Address ROM deficits which will enable patient  to return to PLOF/employment  Patient agrees with established plan of care and assisted in the development of their  goals  Barriers to learning:none- no mental/cognitive barriers observed  Preferred learning style(s):   written- demonstration -practice  Preferred Language: English  Potential barriers to progress:none  The patient appears motivated to participate in PT and regain PLOF: yes    Subjective:  Patient reports of 7/10 pain upon arrival and reports its pretty consistent. Any changes in Ambulatory Summary Sheet? None        Objective: Patient continues to have difficulty relaxing with PROM    Flex 145*     PROM      Prior to today's treatment session, patient was screened for signs and symptoms related to COVID-19 including but not limited to verbally answering questions related to feeling ill, cough, or SOB, along with taking temperature via forehead thermometer. Patient presented with all negative signs and symptoms and had no fever >100 degrees Fahrenheit this date.          Exercises: (No more than 4 columns)   Exercise/Equipment 2/26/2021 3/2/2021 3/5/2021 3/9/2021            WARM UP          pendulum       UBE 2' Fwd 3' Fwd 3' Fwd 3' Fwd   TABLE       Seated scap sets 15x3\" 15x3\" 15x3\" 15x3\"   Supine shoulder PROM Next visit FLEX, ABD and ER/IR @75* ABD FLEX, ABD and ER/IR @75* ABD FLEX, ABD and ER/IR @75* ABD              Supine shoulder AAROM Holding towel - FLEXION  15x3\" Holding towel - FLEXION  15x3\" Holding towel - FLEXION  15x3\" Holding towel - FLEXION  15x3\"   STANDING       Counter top bows 10x5\" 10x5\" 10x5\" 10x5\"   ER with cane  15x3\" at wall 15x3\" at wall 15x3\" at wall 15x3\" at wall   Supine AROM Circles @ 90* FLEX cw/ccw 10x Circles @ 90* FLEX cw/ccw 10x Circles @ 90* FLEX cw/ccw 10x Circles @ 90* FLEX cw/ccw 20x   Supine ABC's 1x 1x 1x 1x   Supine punch/Flex 5x 5x 5x 15x   Core wheel slant  10x3\"  Flex/Scap 10x3\" 10x3\" 10x3\"               PROPRIOCEPTION                                          MODALITIES vaso vaso vaso declined                     Other Therapeutic Activities/Education:        Home Exercise Program:  Patient to add towel flexion and circles at 90* to HEP      Manual Treatments:  Supine PROM;      Modalities:  Patient received vasocompression on their R shoulder  for pain and inflammation for 10 min on low pressure. Patient had negative skin reaction afterwards. - Declined      Communication with other providers:        Assessment:  (Response towards treatment session) (Pain Rating)  Patient rated her shoulder pain 8/10 after exercises, stretching and vaso. Plan for Next Session:  focus on PROM R shoulder and scapular strength/mobility. Continue per Protocol.        Time In / Time Out:   5186/2721     If [facility] Please Indicate Time In/Out/Total Time  CPT Code Time in Time out Total Time   exercises   0949 1024     35   manual  1024   1039    15      vaso                                           Total for session       50        Timed Code/Total Treatment Minutes:   50     Next Progress Note due:        Plan of Care Interventions:  [x] Therapeutic Exercise  [] Modalities:  [x] Therapeutic Activity     [] Ultrasound  [] Estim  [] Gait Training      [] Cervical Traction [] Lumbar Traction  [x] Neuromuscular Re-education    [] Cold/hotpack [] Iontophoresis   [x] Instruction in HEP      [x] Vasopneumatic   [] Dry Needling    [x] Manual Therapy               [] Aquatic Therapy              Electronically signed by:  Steffi Posadas PT  3/9/2021, 9:49 AM

## 2021-03-12 ENCOUNTER — HOSPITAL ENCOUNTER (OUTPATIENT)
Dept: PHYSICAL THERAPY | Age: 56
Setting detail: THERAPIES SERIES
Discharge: HOME | End: 2021-03-12
Payer: COMMERCIAL

## 2021-03-12 PROCEDURE — 97016 VASOPNEUMATIC DEVICE THERAPY: CPT

## 2021-03-12 PROCEDURE — 97112 NEUROMUSCULAR REEDUCATION: CPT

## 2021-03-12 PROCEDURE — 97110 THERAPEUTIC EXERCISES: CPT

## 2021-03-12 NOTE — FLOWSHEET NOTE
Outpatient Physical Therapy  [facility]           [x] Phone: [phone]   Fax: [phone]  Radha Medel           [] Phone: [phone]   Fax: [phone]        Physical Therapy Daily Treatment Note  Date:  3/12/2021    Patient Name:  Luciano Sherman    :  1965  MRN: [id number]  Restrictions/Precautions:  Other position/activity restrictions: as of - no AROM R shoulder  Diagnosis:   Diagnosis: R RC repair 20  Date of Injury/Surgery:   Treatment Diagnosis: Treatment Diagnosis: R shoulder pain/R shoulder stiffness/ impaired mobility    Insurance/Certification information: PT Insurance Information: [facility] x   Referring Physician:  Referring Practitioner: Angie Dalton  Next Doctor Visit:    Plan of care signed (Y):    Outcome Measure:  quick DASH 52/55  Visit# / total visits: then PN     End date 21  Pain level: 7/10 R shoulder   Goals:          Long term goals  Time Frame for Long term goals : 12 weeks  Long term goal 1: patient's goal - regain R shoulder function and return to work  Long term goal 2: patientmwill score 33/55 on Quick DASH indicating improved R UE function with ADL/IADL and or less R UE pain symptoms- initial score = 52/55  Long term goal 3: patient will have 120* of FLEX AROM in an upright position inorder to order for ability to reach above shoulder height for ADL/IADL function  Long term goal 4: patient will be independent with HEP in order to be prepared for discharge    Summary of Evaluation  ASSESSMENT  Patient primary complaints:  R shoulder pain/R shoulder stiffness/ impaired mobility    History of condition:R RC repair 20; - no prior surgeries; sleeping in recliner; out of pain meds- has called surgeon; performing pendulums 2-3 day  Current functional limitations: in sling - requires assist for dressing/ and all IADL  Clinical findings: quick DASH 52/55; PROM R Shoulder Flex  0-180: supine 75* limited by pain; R Shoulder ABduction 0-180: supine 70* limited by pain; R Shoulder Int Rotation  0-70: supine shoulder @ 45* ABD= 25* IR; R Shoulder Ext Rotation  0-90: supine shoulder @ 45* ABD= 15* ER; Cervical: L ROT AROM, SB to R/L all pull in R UT  PLOF:employed as - independent with all necessary ADL/IADL- function was limited/slowed by R RC tear/pain  Skilled PT interventions are intended to: Address ROM deficits which will enable patient  to return to PLOF/employment  Patient agrees with established plan of care and assisted in the development of their  goals  Barriers to learning:none- no mental/cognitive barriers observed  Preferred learning style(s):   written- demonstration -practice  Preferred Language: English  Potential barriers to progress:none  The patient appears motivated to participate in PT and regain PLOF: yes    Subjective: patient reports sleeping in the bed now         Any changes in Ambulatory Summary Sheet? None        Objective:upright FLEX AROM R shoulder 110*    Prior to today's treatment session, patient was screened for signs and symptoms related to COVID-19 including but not limited to verbally answering questions related to feeling ill, cough, or SOB, along with taking temperature via forehead thermometer. Patient presented with all negative signs and symptoms and had no fever >100 degrees Fahrenheit this date.          Exercises: (No more than 4 columns)   Exercise/Equipment 3/2/2021 3/5/2021 3/9/2021 3/12/21            WARM UP       pulleys       UBE 3' Fwd 3' Fwd 3' Fwd 3' FWD/BWD   TABLE       Seated scap sets 15x3\" 15x3\" 15x3\" stop   Supine shoulder PROM FLEX, ABD and ER/IR @75* ABD FLEX, ABD and ER/IR @75* ABD FLEX, ABD and ER/IR @75* ABD FLEX, ABD and ER/IR @75* ABD    prone shoulder EXT    2x10      Supine shoulder AAROM Holding towel - FLEXION  15x3\" Holding towel - FLEXION  15x3\" Holding towel - FLEXION  15x3\" Holding towel - FLEXION  15x3\"   STANDING       Counter top bows 10x5\" 10x5\" 10x5\" 10x5\"   ER with cane  15x3\" at wall 15x3\" at wall 15x3\" at wall 15x3\" at wall   Supine AROM Circles @ 90* FLEX cw/ccw 10x Circles @ 90* FLEX cw/ccw 10x Circles @ 90* FLEX cw/ccw 20x Circles @ 90* FLEX cw/ccw 20x   Supine ABC's 1x 1x 1x stop   Supine punch/Flex 5x 5x 15x 2 x10 ea   Core wheel slant  10x3\" 10x3\" 10x3\" 10x3\"        IR behind back    1 x10   PROPRIOCEPTION                                          MODALITIES vaso vaso declined vaso                     Interventions included  verbal and manual cueing intended to facilitate recruitment and strength of specific muscle groups that are utilized by the patient to perform necessary ADL/IADL. Home Exercise Program:  Patient to add towel flexion and circles at 90* to HEP      Manual Treatments:  Supine PROM;      Modalities:  Patient received vasocompression on their R shoulder  for pain and inflammation for 8 min on low pressure. Patient had negative skin reaction afterwards. - patient requested vaso to stop      Communication with other providers:        Assessment:  (Response towards treatment session) (Pain Rating)  Patient rated her shoulder pain 8/10 after exercises, stretching and vaso. Plan for Next Session:  focus on PROM R shoulder and scapular strength/mobility. Continue per Protocol.        Time In / Time Out:   5258/5449     If [facility] Please Indicate Time In/Out/Total Time  CPT Code Time in Time out Total Time   exercises   933 1003     30   Neuro re-ed  1003  1013    10     vaso  1013   1021     8                                 Total for session      38        Timed Code/Total Treatment Minutes:  40/55'     Next Progress Note due:        Plan of Care Interventions:  [x] Therapeutic Exercise  [] Modalities:  [x] Therapeutic Activity     [] Ultrasound  [] Estim  [] Gait Training      [] Cervical Traction [] Lumbar Traction  [x] Neuromuscular Re-education    [] Cold/hotpack [] Iontophoresis   [x] Instruction in HEP      [x] Vasopneumatic   [] Dry Needling    [x] Manual Therapy

## 2021-03-16 ENCOUNTER — HOSPITAL ENCOUNTER (OUTPATIENT)
Dept: PHYSICAL THERAPY | Age: 56
Setting detail: THERAPIES SERIES
Discharge: HOME | End: 2021-03-16
Payer: COMMERCIAL

## 2021-03-16 PROCEDURE — 97110 THERAPEUTIC EXERCISES: CPT

## 2021-03-16 PROCEDURE — 97140 MANUAL THERAPY 1/> REGIONS: CPT

## 2021-03-16 PROCEDURE — 97016 VASOPNEUMATIC DEVICE THERAPY: CPT

## 2021-03-16 PROCEDURE — 97112 NEUROMUSCULAR REEDUCATION: CPT

## 2021-03-16 NOTE — FLOWSHEET NOTE
Outpatient Physical Therapy  [facility]           [x] Phone: [phone]   Fax: [phone]  [facility]           [] Phone: [phone]   Fax: [phone]        Physical Therapy Daily Treatment Note  Date:  3/16/2021    Patient Name:  Kathya Robles    :  1965  MRN: [id number]  Restrictions/Precautions:  Other position/activity restrictions: as of - no AROM R shoulder  Diagnosis:   Diagnosis: R RC repair 20  Date of Injury/Surgery:   Treatment Diagnosis: Treatment Diagnosis: R shoulder pain/R shoulder stiffness/ impaired mobility    Insurance/Certification information: PT Insurance Information: [facility] x   Referring Physician:  Referring Practitioner: Marshall Cole  Next Doctor Visit:    Plan of care signed (Y):    Outcome Measure:  quick DASH 52/55  Visit# / total visits: then PN     End date 21  Pain level: 7/10 R shoulder   Goals:          Long term goals  Time Frame for Long term goals : 12 weeks  Long term goal 1: patient's goal - regain R shoulder function and return to work  Long term goal 2: patientmwill score 33/55 on Quick DASH indicating improved R UE function with ADL/IADL and or less R UE pain symptoms- initial score = 52/55  Long term goal 3: patient will have 120* of FLEX AROM in an upright position inorder to order for ability to reach above shoulder height for ADL/IADL function  Long term goal 4: patient will be independent with HEP in order to be prepared for discharge    Summary of Evaluation  ASSESSMENT  Patient primary complaints:  R shoulder pain/R shoulder stiffness/ impaired mobility    History of condition:R RC repair 20; - no prior surgeries; sleeping in recliner; out of pain meds- has called surgeon; performing pendulums 2-3 day  Current functional limitations: in sling - requires assist for dressing/ and all IADL  Clinical findings: quick DASH 52/55; PROM R Shoulder Flex  0-180: supine 75* limited by pain; R Shoulder ABduction 0-180: supine 70* limited by pain; R Shoulder Int Rotation  0-70: supine shoulder @ 45* ABD= 25* IR; R Shoulder Ext Rotation  0-90: supine shoulder @ 45* ABD= 15* ER; Cervical: L ROT AROM, SB to R/L all pull in R UT  PLOF:employed as - independent with all necessary ADL/IADL- function was limited/slowed by R RC tear/pain  Skilled PT interventions are intended to: Address ROM deficits which will enable patient  to return to PLOF/employment  Patient agrees with established plan of care and assisted in the development of their  goals  Barriers to learning:none- no mental/cognitive barriers observed  Preferred learning style(s):   written- demonstration -practice  Preferred Language: English  Potential barriers to progress:none  The patient appears motivated to participate in PT and regain PLOF: yes    Subjective:  Patient reports of 7/10 pain upon arrival and feels the pain in the shld is in the bone and under the scapula        Any changes in Ambulatory Summary Sheet? None        Objective: PROM ER 65*    Prior to today's treatment session, patient was screened for signs and symptoms related to COVID-19 including but not limited to verbally answering questions related to feeling ill, cough, or SOB, along with taking temperature via forehead thermometer. Patient presented with all negative signs and symptoms and had no fever >100 degrees Fahrenheit this date.          Exercises: (No more than 4 columns)   Exercise/Equipment 3/9/2021 3/12/21 3/16/2021           WARM UP      pulleys   15x5\"   UBE 3' Fwd 3' FWD/BWD 3/3 F/B   TABLE      Supine shoulder PROM FLEX, ABD and ER/IR @75* ABD FLEX, ABD and ER/IR @75* ABD FLEX, ABD and ER/IR @75* ABD    prone shoulder EXT  2x10 2x10      Supine shoulder AAROM Holding towel - FLEXION  15x3\" Holding towel - FLEXION  15x3\" Holding towel - FLEXION  15x3\"   STANDING      Counter top bows 10x5\" 10x5\" 10x5\"   ER with cane  15x3\" at wall 15x3\" at wall 15x3\" at wall   Supine AROM Circles @ 90* FLEX cw/ccw 20x Circles @ 90* FLEX cw/ccw 20x Circles @ 90* FLEX cw/ccw 20x   Supine punch/Flex 15x 2 x10 ea 2x10 ea   Core wheel slant  10x3\" 10x3\" 10x3\" flex/scap        IR behind back  1 x10 10x   PROPRIOCEPTION                                    MODALITIES declined vaso vaso                   Interventions included  verbal and manual cueing intended to facilitate recruitment and strength of specific muscle groups that are utilized by the patient to perform necessary ADL/IADL. Home Exercise Program:  Patient to add towel flexion and circles at 90* to HEP      Manual Treatments:  Supine PROM;      Modalities:  Patient received vasocompression on their R shoulder  for pain and inflammation for 10min on low pressure. Patient had negative skin reaction afterwards. Communication with other providers:        Assessment:  (Response towards treatment session) (Pain Rating)  Patient rated her shoulder pain 8/10 after exercises, stretching and vaso. Advised patient to start taking a pain pill prior to therapy, as patient continues to tense up with any motion of the shld. Plan for Next Session:  focus on PROM R shoulder and scapular strength/mobility. Continue per Protocol.        Time In / Time Out:   0939/1044     If [facility] Please Indicate Time In/Out/Total Time  CPT Code Time in Time out Total Time   exercises   939 1024      30   Neuro re-ed  1009    1024       15      vaso  1034    1044      10   Manual  1024  1034  10                        Total for session               Timed Code/Total Treatment Minutes:   65    Next Progress Note due:        Plan of Care Interventions:  [x] Therapeutic Exercise  [] Modalities:  [x] Therapeutic Activity     [] Ultrasound  [] Estim  [] Gait Training      [] Cervical Traction [] Lumbar Traction  [x] Neuromuscular Re-education    [] Cold/hotpack [] Iontophoresis   [x] Instruction in HEP      [x] Vasopneumatic   [] Dry Needling    [x] Manual Therapy               [] Aquatic Therapy              Electronically signed by:  Milena Lopez  3/16/2021, 9:39 AM

## 2021-03-17 DIAGNOSIS — E03.9 ACQUIRED HYPOTHYROIDISM: ICD-10-CM

## 2021-03-17 DIAGNOSIS — Z01.818 PRE-OP TESTING: ICD-10-CM

## 2021-03-17 DIAGNOSIS — I10 ESSENTIAL HYPERTENSION: ICD-10-CM

## 2021-03-19 ENCOUNTER — HOSPITAL ENCOUNTER (OUTPATIENT)
Dept: PHYSICAL THERAPY | Age: 56
Setting detail: THERAPIES SERIES
Discharge: HOME | End: 2021-03-19
Payer: COMMERCIAL

## 2021-03-19 PROCEDURE — 97140 MANUAL THERAPY 1/> REGIONS: CPT

## 2021-03-19 PROCEDURE — 97016 VASOPNEUMATIC DEVICE THERAPY: CPT

## 2021-03-19 PROCEDURE — 97112 NEUROMUSCULAR REEDUCATION: CPT

## 2021-03-19 PROCEDURE — 97110 THERAPEUTIC EXERCISES: CPT

## 2021-03-19 NOTE — FLOWSHEET NOTE
pain; R Shoulder Int Rotation  0-70: supine shoulder @ 45* ABD= 25* IR; R Shoulder Ext Rotation  0-90: supine shoulder @ 45* ABD= 15* ER; Cervical: L ROT AROM, SB to R/L all pull in R UT  PLOF:employed as - independent with all necessary ADL/IADL- function was limited/slowed by R RC tear/pain  Skilled PT interventions are intended to: Address ROM deficits which will enable patient  to return to PLOF/employment  Patient agrees with established plan of care and assisted in the development of their  goals  Barriers to learning:none- no mental/cognitive barriers observed  Preferred learning style(s):   written- demonstration -practice  Preferred Language: English  Potential barriers to progress:none  The patient appears motivated to participate in PT and regain PLOF: yes    Subjective:  Patient reports of 7/10 pain upon arrival.  Has been working on trying to get her arm behind her back. Any changes in Ambulatory Summary Sheet? None        Objective:  Patient continues to have difficulty relaxing during PROM  Prior to today's treatment session, patient was screened for signs and symptoms related to COVID-19 including but not limited to verbally answering questions related to feeling ill, cough, or SOB, along with taking temperature via forehead thermometer. Patient presented with all negative signs and symptoms and had no fever >100 degrees Fahrenheit this date.          Exercises: (No more than 4 columns)   Exercise/Equipment 3/12/21 3/16/2021 3/19/2021           WARM UP      pulleys  15x5\" 15x5\"   UBE 3' FWD/BWD 3/3 F/B 3/3 F/B   TABLE      Supine shoulder PROM FLEX, ABD and ER/IR @75* ABD FLEX, ABD and ER/IR @75* ABD FLEX, ABD and ER/IR @75* ABD    prone shoulder EXT 2x10 2x10 2x10      Supine shoulder AAROM Holding towel - FLEXION  15x3\" Holding towel - FLEXION  15x3\" Holding towel - FLEXION  15x3\"   STANDING      Counter top bows 10x5\" 10x5\" 10x5\"   ER with cane  15x3\" at wall 15x3\" at wall 15x3\" at wall   Supine AROM Circles @ 90* FLEX cw/ccw 20x Circles @ 90* FLEX cw/ccw 20x Circles @ 90* FLEX cw/ccw 20x   Supine punch/Flex 2 x10 ea 2x10 ea 2x10    Core wheel slant  10x3\" 10x3\" flex/scap 10x3\" flex/scap        IR behind back 1 x10 10x 10x   PROPRIOCEPTION                                    MODALITIES vaso vaso vaso                   Interventions included  verbal and manual cueing intended to facilitate recruitment and strength of specific muscle groups that are utilized by the patient to perform necessary ADL/IADL. Home Exercise Program:  Patient to add towel flexion and circles at 90* to HEP      Manual Treatments:  Supine PROM;      Modalities:  Patient received vasocompression on their R shoulder  for pain and inflammation for 10min on low pressure. Patient had negative skin reaction afterwards. Communication with other providers:        Assessment:  (Response towards treatment session) (Pain Rating)  Patient continued to rate her shoulder pain 7/10 after treatment. Continued difficulty with functional activities such as dressing or shaving her under arms. Plan for Next Session:  focus on PROM R shoulder and scapular strength/mobility. Continue per Protocol.        Time In / Time Out:   0940/ 1035     If [facility] Please Indicate Time In/Out/Total Time  CPT Code Time in Time out Total Time   exercises   0940 1000 20   Neuro re-ed  1000 1015 15   vaso  1025 1035   10   Manual  1015 1025 10                        Total for session        65       Timed Code/Total Treatment Minutes:   65    Next Progress Note due:        Plan of Care Interventions:  [x] Therapeutic Exercise  [] Modalities:  [x] Therapeutic Activity     [] Ultrasound  [] Estim  [] Gait Training      [] Cervical Traction [] Lumbar Traction  [x] Neuromuscular Re-education    [] Cold/hotpack [] Iontophoresis   [x] Instruction in HEP      [x] Vasopneumatic   [] Dry Needling    [x] Manual Therapy               [] Aquatic Therapy Electronically signed by:  Pradeep Duncanuse  3/19/2021, 9:30 AM

## 2021-03-23 ENCOUNTER — TELEPHONE (OUTPATIENT)
Dept: FAMILY MEDICINE CLINIC | Age: 56
End: 2021-03-23

## 2021-03-23 ENCOUNTER — HOSPITAL ENCOUNTER (OUTPATIENT)
Dept: PHYSICAL THERAPY | Age: 56
Setting detail: THERAPIES SERIES
Discharge: HOME | End: 2021-03-23
Payer: COMMERCIAL

## 2021-03-23 ENCOUNTER — NURSE ONLY (OUTPATIENT)
Dept: FAMILY MEDICINE CLINIC | Age: 56
End: 2021-03-23

## 2021-03-23 VITALS — TEMPERATURE: 97.7 F

## 2021-03-23 DIAGNOSIS — R73.09 ELEVATED GLUCOSE: Primary | ICD-10-CM

## 2021-03-23 LAB — HBA1C MFR BLD: 5.7 %

## 2021-03-23 PROCEDURE — 97110 THERAPEUTIC EXERCISES: CPT

## 2021-03-23 PROCEDURE — 97016 VASOPNEUMATIC DEVICE THERAPY: CPT

## 2021-03-23 PROCEDURE — 97112 NEUROMUSCULAR REEDUCATION: CPT

## 2021-03-23 PROCEDURE — 83036 HEMOGLOBIN GLYCOSYLATED A1C: CPT | Performed by: NURSE PRACTITIONER

## 2021-03-23 PROCEDURE — 97140 MANUAL THERAPY 1/> REGIONS: CPT

## 2021-03-23 NOTE — TELEPHONE ENCOUNTER
5.7 is in the prediabetic range. Recommend diet and exercise to help prevent this from progressing to diabetes.

## 2021-03-23 NOTE — FLOWSHEET NOTE
Outpatient Physical Therapy  [facility]           [x] Phone: [phone]   Fax: [phone]  [facility]           [x] Phone: [phone]   Fax: [phone]        Physical Therapy Daily Treatment Note  Date:  3/23/2021    Patient Name:  Santiago Mansfield    :  1965  MRN: [id number]  Restrictions/Precautions:  Other position/activity restrictions: as of - no AROM R shoulder  Diagnosis:   Diagnosis: R RC repair 20  Date of Injury/Surgery:   Treatment Diagnosis: Treatment Diagnosis: R shoulder pain/R shoulder stiffness/ impaired mobility    Insurance/Certification information: PT Insurance Information: [facility] x   Referring Physician:  Referring Practitioner: Latanya Rosales Doctor Visit:    Plan of care signed (Y):    Outcome Measure:  quick DASH 52/55  Visit# / total visits:15/20 then PN     End date 21  Pain level: 7/10 R shoulder   Goals:          Long term goals  Time Frame for Long term goals : 12 weeks  Long term goal 1: patient's goal - regain R shoulder function and return to work  Long term goal 2: patientmwill score 33/55 on Quick DASH indicating improved R UE function with ADL/IADL and or less R UE pain symptoms- initial score = 52/55  Long term goal 3: patient will have 120* of FLEX AROM in an upright position inorder to order for ability to reach above shoulder height for ADL/IADL function  Long term goal 4: patient will be independent with HEP in order to be prepared for discharge    Summary of Evaluation  ASSESSMENT  Patient primary complaints:  R shoulder pain/R shoulder stiffness/ impaired mobility    History of condition:R RC repair 20; - no prior surgeries; sleeping in recliner; out of pain meds- has called surgeon; performing pendulums 2-3 day  Current functional limitations: in sling - requires assist for dressing/ and all IADL  Clinical findings: quick DASH 52/55; PROM R Shoulder Flex  0-180: supine 75* limited by pain; R Shoulder ABduction 0-180: supine 70* limited by pain; R Shoulder Int Rotation  0-70: supine shoulder @ 45* ABD= 25* IR; R Shoulder Ext Rotation  0-90: supine shoulder @ 45* ABD= 15* ER; Cervical: L ROT AROM, SB to R/L all pull in R UT  PLOF:employed as - independent with all necessary ADL/IADL- function was limited/slowed by R RC tear/pain  Skilled PT interventions are intended to: Address ROM deficits which will enable patient  to return to PLOF/employment  Patient agrees with established plan of care and assisted in the development of their  goals  Barriers to learning:none- no mental/cognitive barriers observed  Preferred learning style(s):   written- demonstration -practice  Preferred Language: English  Potential barriers to progress:none  The patient appears motivated to participate in PT and regain PLOF: yes    Subjective:patient reports ADL  With R UE use has gotten easier this week          Any changes in Ambulatory Summary Sheet? None        Objective: flex arom UPRIGHT 130*  Prior to today's treatment session, patient was screened for signs and symptoms related to COVID-19 including but not limited to verbally answering questions related to feeling ill, cough, or SOB, along with taking temperature via forehead thermometer. Patient presented with all negative signs and symptoms and had no fever >100 degrees Fahrenheit this date.          Exercises: (No more than 4 columns)   Exercise/Equipment 3/12/21 3/16/2021 3/19/2021  3/23/21            WARM UP       pulleys  15x5\" 15x5\" 15x5\"   UBE 3' FWD/BWD 3/3 F/B 3/3 F/B 3/3 F/B   TABLE       Prone HABD    Knuckles to ceiling 1 x10   Supine shoulder PROM FLEX, ABD and ER/IR @75* ABD FLEX, ABD and ER/IR @75* ABD FLEX, ABD and ER/IR @75* ABD FLEX, ABD and ER/IR @75* ABD    prone shoulder EXT 2x10 2x10 2x10 3 x10   Side ABD    Green ball x 15   Side ER    Green ball x 15      Supine shoulder AAROM Holding towel - FLEXION  15x3\" Holding towel - FLEXION  15x3\" Holding towel - FLEXION  15x3\" Holding towel - FLEXION  15x3\"   STANDING       Counter top bows 10x5\" 10x5\" 10x5\" 10x5\"   ER with cane  15x3\" at wall 15x3\" at wall 15x3\" at wall 15x3\" at wall   Supine AROM Circles @ 90* FLEX cw/ccw 20x Circles @ 90* FLEX cw/ccw 20x Circles @ 90* FLEX cw/ccw 20x Circles @ 90* FLEX cw/ccw 2 x15 ea   Supine punch/Flex 2 x10 ea 2x10 ea 2x10  Beach chair FLEX 1 x10 with green ball   Core wheel slant  10x3\" 10x3\" flex/scap 10x3\" flex/scap 15 X 3' flex        IR behind back 1 x10 10x 10x 10x   PROPRIOCEPTION                                          MODALITIES vaso vaso vaso vaso                     Interventions included  verbal and manual cueing intended to facilitate recruitment and strength of specific muscle groups that are utilized by the patient to perform necessary ADL/IADL. Home Exercise Program:  Patient to add towel flexion and circles at 90* to HEP      Manual Treatments:  Supine PROM;      Modalities:  Patient received vasocompression on their R shoulder  for pain and inflammation for 10min on low pressure. Patient had negative skin reaction afterwards. Communication with other providers:        Assessment:  (Response towards treatment session) (Pain Rating)  Patient continued to rate her shoulder pain 7/10 after treatment. Continued difficulty with functional activities such as dressing or shaving her under arms. Plan for Next Session:  focus on PROM R shoulder and scapular strength/mobility. Continue per Protocol.        Time In / Time Out:   0940/ 1035     If [facility] Please Indicate Time In/Out/Total Time  CPT Code Time in Time out Total Time   exercises   1434 1454 20   Neuro re-ed  1454 1513 19   vaso  1523 1533   10   Manual  1513 1523 10                        Total for session        59       Timed Code/Total Treatment Minutes:   59    Next Progress Note due:        Plan of Care Interventions:  [x] Therapeutic Exercise  [] Modalities:  [x] Therapeutic Activity     [] Ultrasound  [] Estim  [] Gait Training      [] Cervical Traction [] Lumbar Traction  [x] Neuromuscular Re-education    [] Cold/hotpack [] Iontophoresis   [x] Instruction in HEP      [x] Vasopneumatic   [] Dry Needling    [x] Manual Therapy               [] Aquatic Therapy              Electronically signed by:  Vanessa Vieira  3/23/2021, 2:35 PM

## 2021-03-23 NOTE — TELEPHONE ENCOUNTER
Called and spoke to patient advised diet and exercise to help prevent the progress to diabetes patient verbalized understanding

## 2021-03-23 NOTE — TELEPHONE ENCOUNTER
Pt was in for a POCT A1C today. Her result was 5.7%. Pt states that Charles Ramos had told her to get A1C checked due to previous elevated glucose on labs. Please advise.

## 2021-03-24 ENCOUNTER — OFFICE VISIT (OUTPATIENT)
Dept: ORTHOPEDIC SURGERY | Age: 56
End: 2021-03-24

## 2021-03-24 ENCOUNTER — TELEPHONE (OUTPATIENT)
Dept: ORTHOPEDIC SURGERY | Age: 56
End: 2021-03-24

## 2021-03-24 VITALS
RESPIRATION RATE: 15 BRPM | HEIGHT: 66 IN | WEIGHT: 218 LBS | HEART RATE: 76 BPM | OXYGEN SATURATION: 98 % | BODY MASS INDEX: 35.03 KG/M2

## 2021-03-24 DIAGNOSIS — S46.011A TRAUMATIC COMPLETE TEAR OF RIGHT ROTATOR CUFF, INITIAL ENCOUNTER: Primary | ICD-10-CM

## 2021-03-24 DIAGNOSIS — Z98.890 S/P RIGHT ROTATOR CUFF REPAIR: ICD-10-CM

## 2021-03-24 PROCEDURE — 99024 POSTOP FOLLOW-UP VISIT: CPT | Performed by: ORTHOPAEDIC SURGERY

## 2021-03-24 ASSESSMENT — ENCOUNTER SYMPTOMS
COLOR CHANGE: 0
SHORTNESS OF BREATH: 0

## 2021-03-24 NOTE — PATIENT INSTRUCTIONS
Continue weight-bearing as tolerated. Continue range of motion exercises as instructed. Ice and elevate as needed. Tylenol or Motrin for pain.   Follow up as needed  May returns to work with no restrictions on 6/7/21

## 2021-03-24 NOTE — PROGRESS NOTES
Subjective:      Patient ID: Manjula Doan is a 54 y.o. female. Patient returns today for s/p right shoulder rotator cuff repair DOS 12/31/20 12 weeks. Pt states pain today is a 8/10. Pt states that she has increase pain after therapy. Pt states that she will be very sore after therapy for two days. Pt states she is still working on her ROM she is weak in there right arm. She can lift a coffee cup without discomfort but can not lift a gallon of milk without assistance of the left amr. She comes in today for her 3-month postop recheck after right shoulder rotator cuff repair. She states that since I saw her last she has continued working with physical therapy and her motion has returned to almost normal except for reaching behind her back is still stiff. She is getting ready to begin strengthening with physical therapy next week. Patient denies any new injury to the involved extremity/ joint, denies numbness or tingling in the involved extremity and denies fever or chills. Review of Systems   Constitutional: Negative for activity change, chills and fever. Respiratory: Negative for shortness of breath. Cardiovascular: Negative for chest pain. Musculoskeletal: Positive for myalgias. Negative for arthralgias, gait problem and joint swelling. Skin: Negative for color change, pallor, rash and wound. Neurological: Positive for weakness. Negative for numbness. Past Medical History:   Diagnosis Date    COPD (chronic obstructive pulmonary disease) ([facility] [address]     Denies COPD, uses inhaler for SOB - prn - hx: smoking    H/O echocardiogram 08/28/2018    EF 55-60%. No significant valvular disease.  Hernia, umbilical     History of cardiac monitoring 07/31/2018    Conclusion: 30 days event monitor suggesting sinus rhythm with symptomatic sinus tachycardia.   So the symptoms are reported during normal rate and rhythm    Hyperlipidemia LDL goal <130 7/31/2018    Hypertension Follows with PCP    Hypothyroidism     Prolonged emergence from general anesthesia     Tachycardia     Wears partial dentures        Objective:   Physical Exam  Constitutional:       Appearance: She is well-developed. HENT:      Head: Normocephalic and atraumatic. Eyes:      Pupils: Pupils are equal, round, and reactive to light. Neck:      Musculoskeletal: Normal range of motion. Pulmonary:      Effort: Pulmonary effort is normal.   Musculoskeletal: Normal range of motion. General: No swelling, tenderness or deformity. Right shoulder: She exhibits decreased strength. She exhibits normal range of motion, no tenderness, no bony tenderness, no swelling, no effusion, no crepitus, no deformity, no laceration, no pain, no spasm and normal pulse. Left shoulder: She exhibits normal range of motion, no tenderness, no bony tenderness, no swelling, no effusion, no crepitus, no deformity, no laceration, no pain, no spasm, normal pulse and normal strength. Right elbow: Normal.     Left elbow: Normal.   Skin:     General: Skin is warm and dry. Coloration: Skin is not pale. Findings: No erythema or rash. Neurological:      Mental Status: She is alert and oriented to person, place, and time. Sensory: No sensory deficit. Right shoulder-Incision clean, dry, intact, with no erythema, no drainage, and no signs of infection. Flexion 180  Abduction 180  External rotation 90   Internal rotation 85  Negative Conrad sign. Strength 4/5 to resisted abduction. Assessment:      Right shoulder rotator cuff repair, 3 months      Plan:       I discussed with her today that she is continuing to progress extremely well. I discussed with the patient today that their are symptoms are normal and should improve with time. I reassured her that it will take 9 months for complete improvement in her symptoms.   At this point she can resume lifting, pushing, pulling as tolerated with the right arm. Continue physical therapy for strengthening of the right arm. Continue weight-bearing as tolerated. Continue range of motion exercises as instructed. Ice and elevate as needed. Tylenol or Motrin for pain. Follow up as needed. I will allow her to return to work in 2 months with no restrictions.                 Sachin [address], DO

## 2021-03-24 NOTE — LETTER
[facility]  [address] [address]  Phone: [phone]    Loraine Cantor DO        March 24, 2021     Patient: Deon Allen   YOB: 1965   Date of Visit: 3/24/2021       To Whom It May Concern: It is my medical opinion that Brissa Larios may return to work on 6/7/21 with no restrictions . If you have any questions or concerns, please don't hesitate to call.     Sincerely,        Loraine Cantor DO

## 2021-03-26 ENCOUNTER — HOSPITAL ENCOUNTER (OUTPATIENT)
Dept: PHYSICAL THERAPY | Age: 56
Setting detail: THERAPIES SERIES
Discharge: HOME | End: 2021-03-26
Payer: COMMERCIAL

## 2021-03-26 PROCEDURE — 97016 VASOPNEUMATIC DEVICE THERAPY: CPT

## 2021-03-26 PROCEDURE — 97110 THERAPEUTIC EXERCISES: CPT

## 2021-03-26 PROCEDURE — 97112 NEUROMUSCULAR REEDUCATION: CPT

## 2021-03-26 PROCEDURE — 97140 MANUAL THERAPY 1/> REGIONS: CPT

## 2021-03-26 NOTE — FLOWSHEET NOTE
Outpatient Physical Therapy  [facility]           [x] Phone: [phone]   Fax: [phone]  [facility] [place]           [x] Phone: [phone]   Fax: [phone]        Physical Therapy Daily Treatment Note  Date:  3/26/2021    Patient Name:  Eileen Carson    :  1965  MRN: [id number]  Restrictions/Precautions:  Other position/activity restrictions: as of - no AROM R shoulder  Diagnosis:   Diagnosis: R RC repair 20  Date of Injury/Surgery:   Treatment Diagnosis: Treatment Diagnosis: R shoulder pain/R shoulder stiffness/ impaired mobility    Insurance/Certification information: PT Insurance Information: [facility] x   Referring Physician:  Referring Practitioner: Linette Arvizu  Next Doctor Visit:    Plan of care signed (Y):    Outcome Measure:  quick DASH 52/55  Visit# / total visits: then PN     End date 21  Pain level: 7/10 R shoulder   Goals:          Long term goals  Time Frame for Long term goals : 12 weeks  Long term goal 1: patient's goal - regain R shoulder function and return to work  Long term goal 2: patientmwill score 33/55 on Quick DASH indicating improved R UE function with ADL/IADL and or less R UE pain symptoms- initial score = 52/55  Long term goal 3: patient will have 120* of FLEX AROM in an upright position inorder to order for ability to reach above shoulder height for ADL/IADL function  Long term goal 4: patient will be independent with HEP in order to be prepared for discharge    Summary of Evaluation  ASSESSMENT  Patient primary complaints:  R shoulder pain/R shoulder stiffness/ impaired mobility    History of condition:R RC repair 20; - no prior surgeries; sleeping in recliner; out of pain meds- has called surgeon; performing pendulums 2-3 day  Current functional limitations: in sling - requires assist for dressing/ and all IADL  Clinical findings: quick DASH 52/55; PROM R Shoulder Flex  0-180: supine 75* limited by pain; R Shoulder ABduction 0-180: supine 70* limited by pain; R Shoulder Int Rotation  0-70: supine shoulder @ 45* ABD= 25* IR; R Shoulder Ext Rotation  0-90: supine shoulder @ 45* ABD= 15* ER; Cervical: L ROT AROM, SB to R/L all pull in R UT  PLOF:employed as - independent with all necessary ADL/IADL- function was limited/slowed by R RC tear/pain  Skilled PT interventions are intended to: Address ROM deficits which will enable patient  to return to PLOF/employment  Patient agrees with established plan of care and assisted in the development of their  goals  Barriers to learning:none- no mental/cognitive barriers observed  Preferred learning style(s):   written- demonstration -practice  Preferred Language: English  Potential barriers to progress:none  The patient appears motivated to participate in PT and regain PLOF: yes    Subjective:  Patient reports of 7/10 pain upon arrival and reports her surgeon has released her from his care and she is to follow up with her family doctor. She reports her surgeon wants her off work till June 7th. Any changes in Ambulatory Summary Sheet? None        Objective:  AAROM ER 63*  Prior to today's treatment session, patient was screened for signs and symptoms related to COVID-19 including but not limited to verbally answering questions related to feeling ill, cough, or SOB, along with taking temperature via forehead thermometer. Patient presented with all negative signs and symptoms and had no fever >100 degrees Fahrenheit this date.          Exercises: (No more than 4 columns)   Exercise/Equipment  3/23/21 3/26/2021          WARM UP     pulleys 15x5\" 15x5\"   UBE 3/3 F/B 3/3 F/B   TABLE     Prone HABD Knuckles to ceiling 1 x10 Knuckles to ceiling 1 x10   Supine shoulder PROM FLEX, ABD and ER/IR @75* ABD FLEX, ABD and ER/IR @75* ABD    prone shoulder EXT 3 x10 2x15   Side ABD Green ball x 15 Green ball x 15   Side ER Green ball x 15 Green ball x 15      Supine shoulder AAROM Holding towel - FLEXION  15x3\" Holding towel - FLEXION  15x3\"   STANDING     Counter top bows 10x5\" 10x5\"   ER with cane  15x3\" at wall 15x3\" at wall   Supine AROM Circles @ 90* FLEX cw/ccw 2 x15 ea Circles @ 90* FLEX cw/ccw 2 x15 ea   Supine punch/Flex Beach chair FLEX 1 x10 with green ball Beach chair FLEX 1 x10 with green ball   Core wheel slant  15 X 3' flex 15x3\" wall        IR behind back 10x 10x   PROPRIOCEPTION                              MODALITIES vaso vaso                 Interventions included  verbal and manual cueing intended to facilitate recruitment and strength of specific muscle groups that are utilized by the patient to perform necessary ADL/IADL. Home Exercise Program:  Patient to add towel flexion and circles at 90* to HEP      Manual Treatments:  Supine PROM;      Modalities:  Patient received vasocompression on their R shoulder  for pain and inflammation for 10min on low pressure. Patient had negative skin reaction afterwards. Communication with other providers:        Assessment:  (Response towards treatment session) (Pain Rating)  Patient continued to rate her shoulder pain 8/10 after treatment. Continued difficulty with functional activities such as dressing or shaving her under arms. Plan for Next Session:  focus on PROM R shoulder and scapular strength/mobility. Continue per Protocol.        Time In / Time Out:   0948/1105     If [facility] Please Indicate Time In/Out/Total Time  CPT Code Time in Time out Total Time   exercises   0948 1032   44   Neuro re-ed  1032  1047   15   vaso  1057  1105    8    Manual  1047  1057   10                        Total for session       77        Timed Code/Total Treatment Minutes:   77     Next Progress Note due:        Plan of Care Interventions:  [x] Therapeutic Exercise  [] Modalities:  [x] Therapeutic Activity     [] Ultrasound  [] Estim  [] Gait Training      [] Cervical Traction [] Lumbar Traction  [x] Neuromuscular Re-education    [] Cold/hotpack [] Iontophoresis   [x]

## 2021-03-30 ENCOUNTER — HOSPITAL ENCOUNTER (OUTPATIENT)
Dept: PHYSICAL THERAPY | Age: 56
Setting detail: THERAPIES SERIES
Discharge: HOME | End: 2021-03-30
Payer: COMMERCIAL

## 2021-03-30 PROCEDURE — 97016 VASOPNEUMATIC DEVICE THERAPY: CPT

## 2021-03-30 PROCEDURE — 97112 NEUROMUSCULAR REEDUCATION: CPT

## 2021-03-30 PROCEDURE — 97110 THERAPEUTIC EXERCISES: CPT

## 2021-03-30 NOTE — FLOWSHEET NOTE
Outpatient Physical Therapy  [facility]           [x] Phone: [phone]   Fax: [phone]  [facility]           [x] Phone: [phone]   Fax: [phone]        Physical Therapy Daily Treatment Note  Date:  3/30/2021    Patient Name:  Denise Ruvalcaba    :  1965  MRN: [id number]  Restrictions/Precautions:  Other position/activity restrictions: as of - no AROM R shoulder  Diagnosis:   Diagnosis: R RC repair 20  Date of Injury/Surgery:   Treatment Diagnosis: Treatment Diagnosis: R shoulder pain/R shoulder stiffness/ impaired mobility    Insurance/Certification information: PT Insurance Information: [facility] x   Referring Physician:  Referring Practitioner: Aldair Azul  Next Doctor Visit:    Plan of care signed (Y):    Outcome Measure:  quick DASH 52/55  Visit# / total visits: then PN     End date 21  Pain level: 7/10 R shoulder   Goals:          Long term goals  Time Frame for Long term goals : 12 weeks  Long term goal 1: patient's goal - regain R shoulder function and return to work  Long term goal 2: patientmwill score 33/55 on Quick DASH indicating improved R UE function with ADL/IADL and or less R UE pain symptoms- initial score = 52/55  Long term goal 3: patient will have 120* of FLEX AROM in an upright position inorder to order for ability to reach above shoulder height for ADL/IADL function  Long term goal 4: patient will be independent with HEP in order to be prepared for discharge    Summary of Evaluation  ASSESSMENT  Patient primary complaints:  R shoulder pain/R shoulder stiffness/ impaired mobility    History of condition:R RC repair 20; - no prior surgeries; sleeping in recliner; out of pain meds- has called surgeon; performing pendulums 2-3 day  Current functional limitations: in sling - requires assist for dressing/ and all IADL  Clinical findings: quick DASH 52/55; PROM R Shoulder Flex  0-180: supine 75* limited by pain; R Shoulder ABduction 0-180: supine 70* limited by Circles @ 90* FLEX cw/ccw 2 x15 ea Circles @ 90* FLEX cw/ccw 2 x15 ea   Supine punch/  Flex in beach [address] chair FLEX 1 x10 with green ball Supine punch 2# 2 x15; Beach chair FLEX 3 x10 with green bal   Core wheel wall  15x3\" wall         IR behind back 10x Ball pass around waist   Beach chair- punch out/press up  Holding tennis 1 x10                            MODALITIES vaso vaso                 Interventions included  verbal and manual cueing intended to facilitate recruitment and strength of specific muscle groups that are utilized by the patient to perform necessary ADL/IADL. Home Exercise Program:  Patient to add towel flexion and circles at 90* to HEP      Manual Treatments:  Supine PROM;      Modalities:  Patient received vasocompression on their R shoulder  for pain and inflammation for 10min on low pressure. Patient had negative skin reaction afterwards. Communication with other providers:        Assessment:  (Response towards treatment session) (Pain Rating)  Patient continued to rate her shoulder pain 8/10 after treatment. Continued difficulty with functional activities such as dressing or shaving her under arms. Plan for Next Session:  focus on PROM R shoulder and scapular strength/mobility. Continue per Protocol.        Time In / Time Out:   1010/1111    If [facility] Please Indicate Time In/Out/Total Time  CPT Code Time in Time out Total Time   exercises   1010 1043   33   Neuro re-ed  1043  1101  18   vaso  1101  1111   10                                 Total for session      61       Timed Code/Total Treatment Minutes:  61    Next Progress Note due:        Plan of Care Interventions:  [x] Therapeutic Exercise  [] Modalities:  [x] Therapeutic Activity     [] Ultrasound  [] Estim  [] Gait Training      [] Cervical Traction [] Lumbar Traction  [x] Neuromuscular Re-education    [] Cold/hotpack [] Iontophoresis   [x] Instruction in HEP      [x] Vasopneumatic   [] Dry Needling    [x] Manual Therapy               [] Aquatic Therapy              Electronically signed by:  Julia Gipson  3/30/2021, 10:11 AM

## 2021-04-02 ENCOUNTER — HOSPITAL ENCOUNTER (OUTPATIENT)
Dept: PHYSICAL THERAPY | Age: 56
Setting detail: THERAPIES SERIES
Discharge: HOME | End: 2021-04-02
Payer: COMMERCIAL

## 2021-04-02 PROCEDURE — 97140 MANUAL THERAPY 1/> REGIONS: CPT

## 2021-04-02 PROCEDURE — 97110 THERAPEUTIC EXERCISES: CPT

## 2021-04-02 PROCEDURE — 97016 VASOPNEUMATIC DEVICE THERAPY: CPT

## 2021-04-02 NOTE — FLOWSHEET NOTE
Outpatient Physical Therapy  [place]           [x] Phone: [phone]   Fax: [phone]  [place]           [x] Phone: [phone]   Fax: [phone]        Physical Therapy Daily Treatment Note  Date:  2021    Patient Name:  Avani Banks    :  1965  MRN: [id number]  Restrictions/Precautions:  Other position/activity restrictions: as of - no AROM R shoulder  Diagnosis:   Diagnosis: R RC repair 20  Date of Injury/Surgery:   Treatment Diagnosis: Treatment Diagnosis: R shoulder pain/R shoulder stiffness/ impaired mobility    Insurance/Certification information: PT Insurance Information: [facility] x   Referring Physician:  Referring Practitioner: Channing Evans  Next Doctor Visit:    Plan of care signed (Y):    Outcome Measure:  quick DASH 52/55  Visit# / total visits: then PN     End date 21  Pain level: 6/10 R shoulder   Goals:          Long term goals  Time Frame for Long term goals : 12 weeks  Long term goal 1: patient's goal - regain R shoulder function and return to work  Long term goal 2: patientmwill score 33/55 on Quick DASH indicating improved R UE function with ADL/IADL and or less R UE pain symptoms- initial score = 52/55  Long term goal 3: patient will have 120* of FLEX AROM in an upright position inorder to order for ability to reach above shoulder height for ADL/IADL function  Long term goal 4: patient will be independent with HEP in order to be prepared for discharge    Summary of Evaluation  ASSESSMENT  Patient primary complaints:  R shoulder pain/R shoulder stiffness/ impaired mobility    History of condition:R RC repair 20; - no prior surgeries; sleeping in recliner; out of pain meds- has called surgeon; performing pendulums 2-3 day  Current functional limitations: in sling - requires assist for dressing/ and all IADL  Clinical findings: quick DASH 52/55; PROM R Shoulder Flex  0-180: supine 75* limited by pain; R Shoulder ABduction 0-180: supine 70* limited by pain; R Shoulder Int Rotation  0-70: supine shoulder @ 45* ABD= 25* IR; R Shoulder Ext Rotation  0-90: supine shoulder @ 45* ABD= 15* ER; Cervical: L ROT AROM, SB to R/L all pull in R UT  PLOF:employed as - independent with all necessary ADL/IADL- function was limited/slowed by R RC tear/pain  Skilled PT interventions are intended to: Address ROM deficits which will enable patient  to return to PLOF/employment  Patient agrees with established plan of care and assisted in the development of their  goals  Barriers to learning:none- no mental/cognitive barriers observed  Preferred learning style(s):   written- demonstration -practice  Preferred Language: English  Potential barriers to progress:none  The patient appears motivated to participate in PT and regain PLOF: yes    Subjective:  Shoulder pain rated 6/10. Had her follow up with the surgeon. He wants her to have 20 more weeks of therapy. 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 Had a really good workout on Tuesday. Any changes in Ambulatory Summary Sheet? None        Objective:  Continues to demonstrate increased tightness with IR PROM. Pain noted with all motions. Prior to today's treatment session, patient was screened for signs and symptoms related to COVID-19 including but not limited to verbally answering questions related to feeling ill, cough, or SOB, along with taking temperature via forehead thermometer. Patient presented with all negative signs and symptoms and had no fever >100 degrees Fahrenheit this date.          Exercises: (No more than 4 columns)   Exercise/Equipment 3/26/2021 3/30/21 4/2/2021           WARM UP      pulleys 15x5\" 20 x5\" 20x5\"    UBE 3/3 F/B 3/3 F/B 3/3 F/B   TABLE      Prone HABD Knuckles to ceiling 1 x10 Knuckles to ceiling 1 x10, 1 x5 Knuckles to ceiling 1 x10, 1 x5   Supine shoulder PROM FLEX, ABD and ER/IR @75* ABD FLEX, ABD and ER/IR @75* ABD FLEX, ABD and ER/IR @75* ABD    prone shoulder EXT 2x15 Green ball 3 x10 Green Ball 3x10   Side ABD Green ball x 15 Green ball 3 x10 Green Ball 3x10   Side ER Green ball x 15 Green ball 3 x10 Green ball 3 x10      Supine shoulder AAROM Holding towel - FLEXION  15x3\" 5 ct x 5 reps patient not R arm with L hand 5 ct x 5 reps hands clasped   STANDING      Counter top bows 10x5\" 12 x 5\" 12x5\"    ER with cane  15x3\" at wall 15x5\" at wall 15x5\"   Supine AROM Circles @ 90* FLEX cw/ccw 2 x15 ea Circles @ 90* FLEX cw/ccw 2 x15 ea Circles at the wall with ball  20x ea way    Supine punch/  Flex in beach c[place]ir [place] chair FLEX 1 x10 with green ball Supine punch 2# 2 x15; Beach chair FLEX 3 x10 with green bal Supine punch 2# 2 x15;    FLEX 3 x10 with green bal   Core wheel wall  15x3\" wall          IR behind back 10x Ball pass around waist Ball pass around waist   10x   Beach chair- punch out/press up  Doug Indigo 1 x10 Holding tennis 1 x10                                 MODALITIES vaso vaso vaso                   Interventions included  verbal and manual cueing intended to facilitate recruitment and strength of specific muscle groups that are utilized by the patient to perform necessary ADL/IADL. Home Exercise Program:  Patient to add towel flexion and circles at 90* to HEP      Manual Treatments:  Supine PROM;      Modalities:  Patient received vasocompression on their R shoulder  for pain and inflammation for 10min on low pressure. Patient had negative skin reaction afterwards. Communication with other providers:        Assessment:  (Response towards treatment session) (Pain Rating)  Patient rated her shoulder pain 7/10 after treatment. Continue to progress strengthening and ROM as tolerated. Plan for Next Session:  focus on PROM R shoulder and scapular strength/mobility. Continue per Protocol.        Time In / Time Out:  4100/3966    If [facility] Please Indicate Time In/Out/Total Time  CPT Code Time in

## 2021-04-05 NOTE — FLOWSHEET NOTE
Received a new C9 for an additional 8  visits, 2 x week for 4 weeks.   From 4/1/21-5/1/21    Scanned and placed in pt's chart

## 2021-04-06 ENCOUNTER — HOSPITAL ENCOUNTER (OUTPATIENT)
Dept: PHYSICAL THERAPY | Age: 56
Setting detail: THERAPIES SERIES
Discharge: HOME | End: 2021-04-06
Payer: COMMERCIAL

## 2021-04-06 PROCEDURE — 97016 VASOPNEUMATIC DEVICE THERAPY: CPT

## 2021-04-06 PROCEDURE — 97140 MANUAL THERAPY 1/> REGIONS: CPT

## 2021-04-06 PROCEDURE — 97110 THERAPEUTIC EXERCISES: CPT

## 2021-04-06 NOTE — FLOWSHEET NOTE
Outpatient Physical Therapy  [place]           [x] Phone: [phone]   Fax: [phone]  [facility]           [x] Phone: [phone]   Fax: [phone]        Physical Therapy Daily Treatment Note  Date:  2021    Patient Name:  Mena Mayer    :  1965  MRN: [id number]  Restrictions/Precautions:  Other position/activity restrictions: as of - no AROM R shoulder  Diagnosis:   Diagnosis: R RC repair 20  Date of Injury/Surgery:   Treatment Diagnosis: Treatment Diagnosis: R shoulder pain/R shoulder stiffness/ impaired mobility    Insurance/Certification information: PT Insurance Information: [facility] x   Referring Physician:  Referring Practitioner: Bryan Sanon  Next Doctor Visit:    Plan of care signed (Y):    Outcome Measure:  quick DASH 52/55  Visit# / total visits: then PN     End date 21  Pain level: 6/10 R shoulder   Goals:          Long term goals  Time Frame for Long term goals : 12 weeks  Long term goal 1: patient's goal - regain R shoulder function and return to work  Long term goal 2: patientmwill score 33/55 on Quick DASH indicating improved R UE function with ADL/IADL and or less R UE pain symptoms- initial score = 52/55  Long term goal 3: patient will have 120* of FLEX AROM in an upright position inorder to order for ability to reach above shoulder height for ADL/IADL function  Long term goal 4: patient will be independent with HEP in order to be prepared for discharge    Summary of Evaluation  ASSESSMENT  Patient primary complaints:  R shoulder pain/R shoulder stiffness/ impaired mobility    History of condition:R RC repair 20; - no prior surgeries; sleeping in recliner; out of pain meds- has called surgeon; performing pendulums 2-3 day  Current functional limitations: in sling - requires assist for dressing/ and all IADL  Clinical findings: quick DASH 52/55; PROM R Shoulder Flex  0-180: supine 75* limited by pain; R Shoulder ABduction 0-180: supine 70* limited by pain; R Shoulder Int Rotation  0-70: supine shoulder @ 45* ABD= 25* IR; R Shoulder Ext Rotation  0-90: supine shoulder @ 45* ABD= 15* ER; Cervical: L ROT AROM, SB to R/L all pull in R UT  PLOF:employed as - independent with all necessary ADL/IADL- function was limited/slowed by R RC tear/pain  Skilled PT interventions are intended to: Address ROM deficits which will enable patient  to return to PLOF/employment  Patient agrees with established plan of care and assisted in the development of their  goals  Barriers to learning:none- no mental/cognitive barriers observed  Preferred learning style(s):   written- demonstration -practice  Preferred Language: English  Potential barriers to progress:none  The patient appears motivated to participate in PT and regain PLOF: yes    Subjective:   Patient she feels she may have slept on her shld wrong and rates her pain at a 6/10. Any changes in Ambulatory Summary Sheet? None        Objective:  Continues to demonstrate increased tightness with IR PROM. Pain demonstrated with all motions. PROM Flex 156*  Prior to today's treatment session, patient was screened for signs and symptoms related to COVID-19 including but not limited to verbally answering questions related to feeling ill, cough, or SOB, along with taking temperature via forehead thermometer. Patient presented with all negative signs and symptoms and had no fever >100 degrees Fahrenheit this date.          Exercises: (No more than 4 columns)   Exercise/Equipment 3/26/2021 3/30/21 4/2/2021 4/6/2021            WARM UP       pulleys 15x5\" 20 x5\" 20x5\"  In use   UBE 3/3 F/B 3/3 F/B 3/3 F/B 2/2 F/B   TABLE       Prone HABD Knuckles to ceiling 1 x10 Knuckles to ceiling 1 x10, 1 x5 Knuckles to ceiling 1 x10, 1 x5 Knuckles to ceiling 20x   Supine shoulder PROM FLEX, ABD and ER/IR @75* ABD FLEX, ABD and ER/IR @75* ABD FLEX, ABD and ER/IR @75* ABD FLEX, ABD and ER/IR @75* ABD    prone shoulder EXT 2x15 Green ball Continue per Protocol.        Time In / Time Out:   8554/3576    If [facility] Please Indicate Time In/Out/Total Time  CPT Code Time in Time out Total Time   exercises   0839 0917    38   vaso  0932   0942   10     Manual  0917  0932    15                         Total for session       63       Timed Code/Total Treatment Minutes:  61 15man 10vaso 38te    Next Progress Note due:        Plan of Care Interventions:  [x] Therapeutic Exercise  [] Modalities:  [x] Therapeutic Activity     [] Ultrasound  [] Estim  [] Gait Training      [] Cervical Traction [] Lumbar Traction  [x] Neuromuscular Re-education    [] Cold/hotpack [] Iontophoresis   [x] Instruction in HEP      [x] Vasopneumatic   [] Dry Needling    [x] Manual Therapy               [] Aquatic Therapy              Electronically signed by:  Yanely Farfan   4/6/2021, 8:39 AM

## 2021-04-09 ENCOUNTER — HOSPITAL ENCOUNTER (OUTPATIENT)
Dept: PHYSICAL THERAPY | Age: 56
Setting detail: THERAPIES SERIES
Discharge: HOME | End: 2021-04-09
Payer: COMMERCIAL

## 2021-04-09 PROCEDURE — 97112 NEUROMUSCULAR REEDUCATION: CPT

## 2021-04-09 PROCEDURE — 97016 VASOPNEUMATIC DEVICE THERAPY: CPT

## 2021-04-09 PROCEDURE — 97110 THERAPEUTIC EXERCISES: CPT

## 2021-04-09 NOTE — FLOWSHEET NOTE
Outpatient Physical Therapy  [place]           [x] Phone: [phone]   Fax: [phone]  [place]           [x] Phone: [phone]   Fax: [phone]        Physical Therapy Daily Treatment Note  Date:  2021    Patient Name:  Donna Paul    :  1965  MRN: [id number]  Restrictions/Precautions: Other position/activity restrictions: as of - no AROM R shoulder  Diagnosis:   Diagnosis: R RC repair 20  Date of Injury/Surgery:   Treatment Diagnosis: Treatment Diagnosis: R shoulder pain/R shoulder stiffness/ impaired mobility    Insurance/Certification information: PT Insurance Information: [facility] x C9 for an additional 8  visits, 2 x week for 4 weeks.   From 21-21  Referring Physician:  Referring Practitioner: Huseyin French  Next Doctor Visit:    Plan of care signed (Y):    Outcome Measure:  quick DASH 52/55  Visit# / total visits: then PN     End date 21  Pain level: 2/10 R shoulder   Goals:          Long term goals  Time Frame for Long term goals : 12 weeks  Long term goal 1: patient's goal - regain R shoulder function and return to work  Long term goal 2: patientmwill score 33/55 on Quick DASH indicating improved R UE function with ADL/IADL and or less R UE pain symptoms- initial score = 52/55  Long term goal 3: patient will have 120* of FLEX AROM in an upright position inorder to order for ability to reach above shoulder height for ADL/IADL function  Long term goal 4: patient will be independent with HEP in order to be prepared for discharge    Summary of Evaluation  ASSESSMENT  Patient primary complaints:  R shoulder pain/R shoulder stiffness/ impaired mobility    History of condition:R RC repair 20; - no prior surgeries; sleeping in recliner; out of pain meds- has called surgeon; performing pendulums 2-3 day  Current functional limitations: in sling - requires assist for dressing/ and all IADL  Clinical findings: quick DASH 52/55; PROM R Shoulder Flex  0-180: supine 75* limited by pain; R Shoulder ABduction 0-180: supine 70* limited by pain; R Shoulder Int Rotation  0-70: supine shoulder @ 45* ABD= 25* IR; R Shoulder Ext Rotation  0-90: supine shoulder @ 45* ABD= 15* ER; Cervical: L ROT AROM, SB to R/L all pull in R UT  PLOF:employed as - independent with all necessary ADL/IADL- function was limited/slowed by R RC tear/pain  Skilled PT interventions are intended to: Address ROM deficits which will enable patient  to return to PLOF/employment  Patient agrees with established plan of care and assisted in the development of their  goals  Barriers to learning:none- no mental/cognitive barriers observed  Preferred learning style(s):   written- demonstration -practice  Preferred Language: English  Potential barriers to progress:none  The patient appears motivated to participate in PT and regain PLOF: yes    Subjective:  Patient reports having difficulty lying on R shoulder    Any changes in Ambulatory Summary Sheet? None        Objective: IR to L5  Prior to today's treatment session, patient was screened for signs and symptoms related to COVID-19 including but not limited to verbally answering questions related to feeling ill, cough, or SOB, along with taking temperature via forehead thermometer. Patient presented with all negative signs and symptoms and had no fever >100 degrees Fahrenheit this date.          Exercises: (No more than 4 columns)   Exercise/Equipment 4/6/2021 4/9/21          WARM UP     pulleys In use 25 x5\"   UBE 2/2 F/B 3/3 F/B   TABLE     Prone HABD Knuckles to ceiling 20x Green Ball 3x10   Supine shoulder PROM FLEX, ABD and ER/IR @75* ABD FLEX, ABD and ER/IR @75* ABD    prone shoulder EXT Green Ball 3x10 1# 3 x10  3 ct   Side ABD Green Ball 3x10 Green Ball 3x10   Side ER [employer] 3x10 Green Ball 2 x15   STANDING     Counter top bows 12x5\" 15 x 5\"   ER with cane  15x5\" 15x5\"   Supine AROM Circles at the wall with ball  20x ea way  Circles at the wall with ball  20x ea way   Supine punch/  Flex in beach chair Supine punch 2# 2 x15;    FLEX 3 x10 with green bal Supine punch 3# 3 x10    FLEX 1# 1 x10; green  Ball 1 x10; circles @ shoulder level- with tennis ball x10 CW/CCW        IR behind back Ball pass around waist   10x Ball pass around waist   15x   Beach chair- punch out/press up Doug Indigo 2 x10 Holding tennis 3 x10   Core wheel  3 x10                       MODALITIES vaso                  Interventions included  verbal and manual cueing intended to facilitate recruitment and strength of specific muscle groups that are utilized by the patient to perform necessary ADL/IADL. Home Exercise Program:  Patient to add towel flexion and circles at 90* to HEP      Manual Treatments:  Supine PROM;      Modalities:  Patient received vasocompression on their R shoulder  for pain and inflammation for 10min on low pressure. Patient had negative skin reaction afterwards. Communication with other providers:        Assessment:  (Response towards treatment session) (Pain Rating)  Patient rated her shoulder pain 5/10 after treatment. Continue to progress strengthening and ROM as tolerated. Plan for Next Session:  focus on PROM R shoulder and scapular strength/mobility. Continue per Protocol.        Time In / Time Out:   0947/1051    If [facility] Please Indicate Time In/Out/Total Time  CPT Code Time in Time out Total Time   exercises   0947 1031   44   vaso  1041   1051    10   Neuro re education 1031  1041     10                        Total for session       64       Timed Code/Total Treatment Minutes:  59'     Next Progress Note due:        Plan of Care Interventions:  [x] Therapeutic Exercise  [] Modalities:  [x] Therapeutic Activity     [] Ultrasound  [] Estim  [] Gait Training      [] Cervical Traction [] Lumbar Traction  [x] Neuromuscular Re-education    [] Cold/hotpack [] Iontophoresis   [x] Instruction in HEP      [x] Vasopneumatic   [] Dry Brianda    [x] Manual Therapy               [] Aquatic Therapy              Electronically signed by:  Kenya Crabtree   4/9/2021, 9:47 AM

## 2021-04-13 ENCOUNTER — HOSPITAL ENCOUNTER (OUTPATIENT)
Dept: PHYSICAL THERAPY | Age: 56
Setting detail: THERAPIES SERIES
Discharge: HOME | End: 2021-04-13
Payer: COMMERCIAL

## 2021-04-13 PROCEDURE — 97016 VASOPNEUMATIC DEVICE THERAPY: CPT

## 2021-04-13 PROCEDURE — 97140 MANUAL THERAPY 1/> REGIONS: CPT

## 2021-04-13 PROCEDURE — 97110 THERAPEUTIC EXERCISES: CPT

## 2021-04-13 NOTE — FLOWSHEET NOTE
Outpatient Physical Therapy  [place]           [x] Phone: [phone]   Fax: [phone]  [facility]           [x] Phone: [phone]   Fax: [phone]        Physical Therapy Daily Treatment Note  Date:  2021    Patient Name:  Carol Rico    :  1965  MRN: [id number]  Restrictions/Precautions: Other position/activity restrictions: as of - no AROM R shoulder  Diagnosis:   Diagnosis: R RC repair 20  Date of Injury/Surgery:   Treatment Diagnosis: Treatment Diagnosis: R shoulder pain/R shoulder stiffness/ impaired mobility    Insurance/Certification information: PT Insurance Information: [facility] x C9 for an additional 8  visits, 2 x week for 4 weeks.   From 21-21  Referring Physician:  Referring Practitioner: oJse Angel Aguilar  Next Doctor Visit:    Plan of care signed (Y):    Outcome Measure:  quick DASH 52/55  Visit# / total visits: then PN     End date 21  Pain level: 6/10 R shoulder   Goals:          Long term goals  Time Frame for Long term goals : 12 weeks  Long term goal 1: patient's goal - regain R shoulder function and return to work  Long term goal 2: patientmwill score 33/55 on Quick DASH indicating improved R UE function with ADL/IADL and or less R UE pain symptoms- initial score = 52/55  Long term goal 3: patient will have 120* of FLEX AROM in an upright position inorder to order for ability to reach above shoulder height for ADL/IADL function  Long term goal 4: patient will be independent with HEP in order to be prepared for discharge    Summary of Evaluation  ASSESSMENT  Patient primary complaints:  R shoulder pain/R shoulder stiffness/ impaired mobility    History of condition:R RC repair 20; - no prior surgeries; sleeping in recliner; out of pain meds- has called surgeon; performing pendulums 2-3 day  Current functional limitations: in sling - requires assist for dressing/ and all IADL  Clinical findings: quick DASH 52/55; PROM R Shoulder Flex  0-180: 15x5\"   Supine AROM Circles at the wall with ball  20x ea way Circles at the wall with ball  20x ea way   Supine punch/  Flex in beach chair Supine punch 3# 3 x10    FLEX 1# 1 x10; green  Ball 1 x10; circles @ shoulder level- with tennis ball x10 CW/CCW Supine punch 3# 3 x10    FLEX 1# 1 x10; green  Ball 1 x10; circles @ shoulder level- with tennis ball x10 CW/CCW        IR behind back Ball pass around waist   15x Ball pass around waist   15x    Beach chair- punch out/press up [employer] tennis 3 x10 Holding ball 3x10   Core wheel 3 x10 3x10                       MODALITIES  See below                 Interventions included  verbal and manual cueing intended to facilitate recruitment and strength of specific muscle groups that are utilized by the patient to perform necessary ADL/IADL. Home Exercise Program:  Patient to add towel flexion and circles at 90* to HEP      Manual Treatments:  Supine PROM;      Modalities:  Patient received vasocompression on their R shoulder  for pain and inflammation for 10min on low pressure. Patient had negative skin reaction afterwards. Communication with other providers:        Assessment:  (Response towards treatment session) (Pain Rating)  Patient rated her shoulder pain 7/10 after treatment. Continue to progress strengthening and ROM as tolerated. Plan for Next Session:  focus on PROM R shoulder and scapular strength/mobility. Continue per Protocol.        Time In / Time Out:   0948/1055    If [facility] Please Indicate Time In/Out/Total Time  CPT Code Time in Time out Total Time   exercises   0948  1032     44   vaso  1045    1055    10     manual  1032   1045      13                        Total for session       67        Timed Code/Total Treatment Minutes:  79 44te 13man 10vaso      Next Progress Note due:        Plan of Care Interventions:  [x] Therapeutic Exercise  [] Modalities:  [x] Therapeutic Activity     [] Ultrasound  [] Estim  [] Gait Training      []

## 2021-04-16 ENCOUNTER — HOSPITAL ENCOUNTER (OUTPATIENT)
Dept: PHYSICAL THERAPY | Age: 56
Setting detail: THERAPIES SERIES
Discharge: HOME | End: 2021-04-16
Payer: COMMERCIAL

## 2021-04-16 PROCEDURE — 97140 MANUAL THERAPY 1/> REGIONS: CPT

## 2021-04-16 PROCEDURE — 97016 VASOPNEUMATIC DEVICE THERAPY: CPT

## 2021-04-16 PROCEDURE — 97110 THERAPEUTIC EXERCISES: CPT

## 2021-04-16 NOTE — FLOWSHEET NOTE
Outpatient Physical Therapy  [place]           [x] Phone: [phone]   Fax: [phone]  Ольга fritz           [x] Phone: [phone]   Fax: [phone]        Physical Therapy Daily Treatment Note  Date:  2021    Patient Name:  Edilson Rodríguez    :  1965  MRN: [id number]  Restrictions/Precautions: Other position/activity restrictions: as of - no AROM R shoulder  Diagnosis:   Diagnosis: R RC repair 20  Date of Injury/Surgery:   Treatment Diagnosis: Treatment Diagnosis: R shoulder pain/R shoulder stiffness/ impaired mobility    Insurance/Certification information: PT Insurance Information: [facility] x C9 for an additional 8  visits, 2 x week for 4 weeks.   From 21-21  Referring Physician:  Referring Practitioner: Zeenat Rivera  Next Doctor Visit:    Plan of care signed (Y):    Outcome Measure:  quick DASH 52/55  Visit# / total visits: then PN     End date 21  Pain level: 4-5/10 R shoulder   Goals:          Long term goals  Time Frame for Long term goals : 12 weeks  Long term goal 1: patient's goal - regain R shoulder function and return to work  Long term goal 2: patientmwill score 33/55 on Quick DASH indicating improved R UE function with ADL/IADL and or less R UE pain symptoms- initial score = 52/55  Long term goal 3: patient will have 120* of FLEX AROM in an upright position inorder to order for ability to reach above shoulder height for ADL/IADL function  Long term goal 4: patient will be independent with HEP in order to be prepared for discharge    Summary of Evaluation  ASSESSMENT  Patient primary complaints:  R shoulder pain/R shoulder stiffness/ impaired mobility    History of condition:R RC repair 20; - no prior surgeries; sleeping in recliner; out of pain meds- has called surgeon; performing pendulums 2-3 day  Current functional limitations: in sling - requires assist for dressing/ and all IADL  Clinical findings: quick DASH 52/55; PROM R Shoulder Flex  0-180: supine 75* limited by pain; R Shoulder ABduction 0-180: supine 70* limited by pain; R Shoulder Int Rotation  0-70: supine shoulder @ 45* ABD= 25* IR; R Shoulder Ext Rotation  0-90: supine shoulder @ 45* ABD= 15* ER; Cervical: L ROT AROM, SB to R/L all pull in R UT  PLOF:employed as - independent with all necessary ADL/IADL- function was limited/slowed by R RC tear/pain  Skilled PT interventions are intended to: Address ROM deficits which will enable patient  to return to PLOF/employment  Patient agrees with established plan of care and assisted in the development of their  goals  Barriers to learning:none- no mental/cognitive barriers observed  Preferred learning style(s):   written- demonstration -practice  Preferred Language: English  Potential barriers to progress:none  The patient appears motivated to participate in PT and regain PLOF: yes    Subjective:  Patient reports of 4-5/10 pain upon arrival and continues to c/o discomfort. Any changes in Ambulatory Summary Sheet?   None        Objective:  Supine AROM ER 80*  COVID screening questions were asked and patient attested that there had been no contact or symptoms        Exercises: (No more than 4 columns)   Exercise/Equipment 4/9/21 4/13/2021 4/16/2021           WARM UP      pulleys 25 x5\" 25x5\" 25x5\"   UBE 3/3 F/B 3/3 F/B 3/3 F/B   TABLE      Prone HABD Green Ball 3x10 Green Ball 3x10 Green Ball 3x10   Supine shoulder PROM FLEX, ABD and ER/IR @75* ABD Performed  performed    prone shoulder EXT 1# 3 x10  3 ct 1# 3 x10  3 ct 1# 3 x10  3 ct   Side ABD Green Ball 3x10 Green Ball 3x10 Green Ball 3x10   Side ER Green Romilda Hides 2 x15 Green Ball 3x10   Green Ball 3x10   STANDING      Counter top bows 15 x 5\" 15x5\" 15x5\"   ER with cane  15x5\" 15x5\" 15x5\"   Supine AROM Circles at the wall with ball  20x ea way Circles at the wall with ball  20x ea way Circles at the wall with ball  20x ea way   Supine punch/  Flex in beach chair Supine punch 3# 3 x10    FLEX 1# 1 x10; green  Ball 1 x10; circles @ shoulder level- with tennis ball x10 CW/CCW Supine punch 3# 3 x10    FLEX 1# 1 x10; green  Ball 1 x10; circles @ shoulder level- with tennis ball x10 CW/CCW Supine punch 3# 3 x10                                                          Flex 1# 1x10                          Circles CW/CCW 1# 1x10 ea way        IR behind back Ball pass around waist   15x Ball pass around waist   15x  Ball pass around waist 20x   Beach chair- punch out/press up Doug Hammett 3 x10 Holding ball 3x10 Holding ball 3x10   Core wheel 3 x10 3x10 3x10                           MODALITIES  See below See below                   Interventions included  verbal and manual cueing intended to facilitate recruitment and strength of specific muscle groups that are utilized by the patient to perform necessary ADL/IADL. Home Exercise Program:  Patient to add towel flexion and circles at 90* to HEP      Manual Treatments:  Supine PROM;      Modalities:  Patient received vasocompression on their R shoulder  for pain and inflammation for 10min on low pressure. Patient had negative skin reaction afterwards. Communication with other providers:        Assessment:  (Response towards treatment session) (Pain Rating)  Patient rated her shoulder pain 7/10 after treatment. Continue to progress strengthening and ROM as tolerated. Plan for Next Session:  focus on PROM R shoulder and scapular strength/mobility. Continue per Protocol.        Time In / Time Out:   0958/1111    If [facility] Please Indicate Time In/Out/Total Time  CPT Code Time in Time out Total Time   exercises   0958  1048      50   vaso  1101     1111     10      manual  1048    1101      13                          Total for session                Timed Code/Total Treatment Minutes:  73 50te 10vaso 13man      Next Progress Note due:        Plan of Care Interventions:  [x] Therapeutic Exercise  [] Modalities:  [x] Therapeutic Activity     []

## 2021-04-20 ENCOUNTER — HOSPITAL ENCOUNTER (OUTPATIENT)
Dept: PHYSICAL THERAPY | Age: 56
Setting detail: THERAPIES SERIES
Discharge: HOME | End: 2021-04-20
Payer: COMMERCIAL

## 2021-04-20 PROCEDURE — 97110 THERAPEUTIC EXERCISES: CPT

## 2021-04-20 NOTE — FLOWSHEET NOTE
Outpatient Physical Therapy  [place]           [x] Phone: [phone]   Fax: [phone]  Ольга fritz           [x] Phone: [phone]   Fax: [phone]        Physical Therapy Daily Treatment Note  Date:  2021    Patient Name:  Jackelyn Leal    :  1965  MRN: [id number]  Restrictions/Precautions: Other position/activity restrictions: as of - no AROM R shoulder  Diagnosis:   Diagnosis: R RC repair 20  Date of Injury/Surgery:   Treatment Diagnosis: Treatment Diagnosis: R shoulder pain/R shoulder stiffness/ impaired mobility    Insurance/Certification information: PT Insurance Information: [facility] x C9 for an additional 8  visits, 2 x week for 4 weeks.   From 21-21  Referring Physician:  Referring Practitioner: Bill Gillis  Next Doctor Visit:    Plan of care signed (Y):    Outcome Measure:  quick DASH 52/55  Visit# / total visits:  then PN     End date 21  Pain level: 4-5/10 R shoulder   Goals:          Long term goals  Time Frame for Long term goals : 12 weeks  Long term goal 1: patient's goal - regain R shoulder function and return to work  Long term goal 2: patientmwill score 33/55 on Quick DASH indicating improved R UE function with ADL/IADL and or less R UE pain symptoms- initial score = 52/55  Long term goal 3: patient will have 120* of FLEX AROM in an upright position inorder to order for ability to reach above shoulder height for ADL/IADL function  Long term goal 4: patient will be independent with HEP in order to be prepared for discharge    Summary of Evaluation  ASSESSMENT  Patient primary complaints:  R shoulder pain/R shoulder stiffness/ impaired mobility    History of condition:R RC repair 20; - no prior surgeries; sleeping in recliner; out of pain meds- has called surgeon; performing pendulums 2-3 day  Current functional limitations: in sling - requires assist for dressing/ and all IADL  Clinical findings: quick DASH 52/55; PROM R Shoulder Flex  0-180: supine 75* limited by pain; R Shoulder ABduction 0-180: supine 70* limited by pain; R Shoulder Int Rotation  0-70: supine shoulder @ 45* ABD= 25* IR; R Shoulder Ext Rotation  0-90: supine shoulder @ 45* ABD= 15* ER; Cervical: L ROT AROM, SB to R/L all pull in R UT  PLOF:employed as - independent with all necessary ADL/IADL- function was limited/slowed by R RC tear/pain  Skilled PT interventions are intended to: Address ROM deficits which will enable patient  to return to PLOF/employment  Patient agrees with established plan of care and assisted in the development of their  goals  Barriers to learning:none- no mental/cognitive barriers observed  Preferred learning style(s):   written- demonstration -practice  Preferred Language: English  Potential barriers to progress:none  The patient appears motivated to participate in PT and regain PLOF: yes    Subjective:  Patient reports of 4-5/10 pain upon arrival and continues to c/o discomfort. She reports she's not feeling well and has had diarrhea since she got up but denies any temp. Any changes in Ambulatory Summary Sheet?   None        Objective:  Supine AROM ER 80*  COVID screening questions were asked and patient attested that there had been no contact or symptoms        Exercises: (No more than 4 columns)   Exercise/Equipment 4/13/2021 4/16/2021 4/20/2021           WARM UP      pulleys 25x5\" 25x5\" 25x5\"   UBE 3/3 F/B 3/3 F/B 3/3 F/B   TABLE      Prone HABD Green Ball 3x10 Green Ball 3x10    Supine shoulder PROM Performed  performed     prone shoulder EXT 1# 3 x10  3 ct 1# 3 x10  3 ct    Side ABD Green Ball 3x10 Green Ball 3x10    Side ER Green Ball 3x10   Green Ball 3x10    STANDING      Counter top bows 15x5\" 15x5\"    ER with cane  15x5\" 15x5\" 15x5\"   Supine AROM Circles at the wall with ball  20x ea way Circles at the wall with ball  20x ea way Circles at the wall with ball  20x ea way   Supine punch/  Flex in beach chair Supine punch 3# 3 x10    FLEX Electronically signed by:  Contur     4/20/2021, 8:41 AM

## 2021-04-23 ENCOUNTER — HOSPITAL ENCOUNTER (OUTPATIENT)
Dept: PHYSICAL THERAPY | Age: 56
Setting detail: THERAPIES SERIES
Discharge: HOME | End: 2021-04-23
Payer: COMMERCIAL

## 2021-04-23 PROCEDURE — 97016 VASOPNEUMATIC DEVICE THERAPY: CPT

## 2021-04-23 PROCEDURE — 97110 THERAPEUTIC EXERCISES: CPT

## 2021-04-23 PROCEDURE — 97140 MANUAL THERAPY 1/> REGIONS: CPT

## 2021-04-23 NOTE — FLOWSHEET NOTE
Outpatient Physical Therapy  [place]           [x] Phone: [phone]   Fax: [phone]  Ольга fritz           [x] Phone: [phone]   Fax: [phone]        Physical Therapy Daily Treatment Note  Date:  2021    Patient Name:  Jackelyn Leal    :  1965  MRN: [id number]  Restrictions/Precautions: Other position/activity restrictions: as of - no AROM R shoulder  Diagnosis:   Diagnosis: R RC repair 20  Date of Injury/Surgery:   Treatment Diagnosis: Treatment Diagnosis: R shoulder pain/R shoulder stiffness/ impaired mobility    Insurance/Certification information: PT Insurance Information: [facility] x C9 for an additional 8  visits, 2 x week for 4 weeks.   From 21-21  Referring Physician:  Referring Practitioner: Bill Gillis  Next Doctor Visit:    Plan of care signed (Y):    Outcome Measure:  quick DASH 52/55  Visit# / total visits:  then PN     End date 21  Pain level:  5/10 R shoulder   Goals:          Long term goals  Time Frame for Long term goals : 12 weeks  Long term goal 1: patient's goal - regain R shoulder function and return to work  Long term goal 2: patientmwill score 33/55 on Quick DASH indicating improved R UE function with ADL/IADL and or less R UE pain symptoms- initial score = 52/55  Long term goal 3: patient will have 120* of FLEX AROM in an upright position inorder to order for ability to reach above shoulder height for ADL/IADL function  Long term goal 4: patient will be independent with HEP in order to be prepared for discharge    Summary of Evaluation  ASSESSMENT  Patient primary complaints:  R shoulder pain/R shoulder stiffness/ impaired mobility    History of condition:R RC repair 20; - no prior surgeries; sleeping in recliner; out of pain meds- has called surgeon; performing pendulums 2-3 day  Current functional limitations: in sling - requires assist for dressing/ and all IADL  Clinical findings: quick DASH 52/55; PROM R Shoulder Flex  0-180: supine 75* limited by pain; R Shoulder ABduction 0-180: supine 70* limited by pain; R Shoulder Int Rotation  0-70: supine shoulder @ 45* ABD= 25* IR; R Shoulder Ext Rotation  0-90: supine shoulder @ 45* ABD= 15* ER; Cervical: L ROT AROM, SB to R/L all pull in R UT  PLOF:employed as - independent with all necessary ADL/IADL- function was limited/slowed by R RC tear/pain  Skilled PT interventions are intended to: Address ROM deficits which will enable patient  to return to PLOF/employment  Patient agrees with established plan of care and assisted in the development of their  goals  Barriers to learning:none- no mental/cognitive barriers observed  Preferred learning style(s):   written- demonstration -practice  Preferred Language: English  Potential barriers to progress:none  The patient appears motivated to participate in PT and regain PLOF: yes    Subjective:  Patient reports of  5/10 pain upon arrival and continues to c/o discomfort. She reports she had a tooth pulled yesterday and is in pain from that as well. Any changes in Ambulatory Summary Sheet?   None        Objective:  Supine  PROM Flex 165*  COVID screening questions were asked and patient attested that there had been no contact or symptoms        Exercises: (No more than 4 columns)   Exercise/Equipment 4/16/2021 4/20/2021 4/23/2021           WARM UP      pulleys 25x5\" 25x5\" 25x5\"   UBE 3/3 F/B 3/3 F/B 3/3 F/B   TABLE      Prone HABD Green Ball 3x10  Green ball 3x10   Supine shoulder PROM performed  performed    prone shoulder EXT 1# 3 x10  3 ct  1# 3x10   Side ABD Green Ball 3x10  1# 3x10   Side ER w/towel in arm pit Green Ball 3x10  1# 3x10   STANDING      Counter top bows 15x5\"     ER with cane  15x5\" 15x5\" 15x5\"   Supine AROM Circles at the wall with ball  20x ea way Circles at the wall with ball  20x ea way Circles at the wall with ball  20x ea way   Supine punch/  Flex in beach chair Supine punch 3# 3 x10 Flex 1# 1x10                          Circles CW/CCW 1# 1x10 ea way  Supine punch 3# 3 x10     Supine Flex 1# 1x10    Supine Circles CW/CCW 1# 1x10 ea way            IR behind back Ball pass around waist 20x  Ball pass around waist 20x ea way   Beach chair- punch out/press up Holding ball 3x10  Holding ball 3x10   Core wheel 3x10 3x10 30x                           MODALITIES See below  See below                   Interventions included  verbal and manual cueing intended to facilitate recruitment and strength of specific muscle groups that are utilized by the patient to perform necessary ADL/IADL. Home Exercise Program:  Patient to add towel flexion and circles at 90* to HEP      Manual Treatments:  Supine PROM;      Modalities:  Patient received vasocompression on their R shoulder  for pain and inflammation for 10min on low pressure. Patient had negative skin reaction afterwards. Communication with other providers:        Assessment:  (Response towards treatment session) (Pain Rating)  Patient did well with all exs today. 5/10 pain at the end of session   Continue to progress strengthening and ROM as tolerated. Plan for Next Session:  focus on PROM R shoulder and scapular strength/mobility. Continue per Protocol.        Time In / Time Out:     0921/1023    If [facility] Please Indicate Time In/Out/Total Time  CPT Code Time in Time out Total Time   exercises   0921    0958        37     vaso  1013      1023     10        manual  0958     1013      15                          Total for session                Timed Code/Total Treatment Minutes:  58   37te 15man 10vaso    Next Progress Note due:        Plan of Care Interventions:  [x] Therapeutic Exercise  [] Modalities:  [x] Therapeutic Activity     [] Ultrasound  [] Estim  [] Gait Training      [] Cervical Traction [] Lumbar Traction  [x] Neuromuscular Re-education    [] Cold/hotpack [] Iontophoresis   [x] Instruction in HEP [x] Vasopneumatic   [] Dry Needling    [x] Manual Therapy               [] Aquatic Therapy              Electronically signed by:  Maddison Robin     4/23/2021, 9:21 AM

## 2021-04-27 ENCOUNTER — HOSPITAL ENCOUNTER (EMERGENCY)
Age: 56
Discharge: HOME | End: 2021-04-27
Attending: EMERGENCY MEDICINE

## 2021-04-27 ENCOUNTER — HOSPITAL ENCOUNTER (OUTPATIENT)
Dept: PHYSICAL THERAPY | Age: 56
Setting detail: THERAPIES SERIES
Discharge: HOME | End: 2021-04-27
Payer: COMMERCIAL

## 2021-04-27 VITALS
HEART RATE: 94 BPM | BODY MASS INDEX: 35.19 KG/M2 | RESPIRATION RATE: 16 BRPM | WEIGHT: 218 LBS | OXYGEN SATURATION: 93 % | SYSTOLIC BLOOD PRESSURE: 135 MMHG | DIASTOLIC BLOOD PRESSURE: 94 MMHG | TEMPERATURE: 97.4 F

## 2021-04-27 DIAGNOSIS — S61.011A LACERATION OF RIGHT THUMB WITHOUT FOREIGN BODY WITHOUT DAMAGE TO NAIL, INITIAL ENCOUNTER: Primary | ICD-10-CM

## 2021-04-27 PROCEDURE — 97140 MANUAL THERAPY 1/> REGIONS: CPT

## 2021-04-27 PROCEDURE — 97110 THERAPEUTIC EXERCISES: CPT

## 2021-04-27 PROCEDURE — 12031 INTMD RPR S/A/T/EXT 2.5 CM/<: CPT

## 2021-04-27 PROCEDURE — 97016 VASOPNEUMATIC DEVICE THERAPY: CPT

## 2021-04-27 PROCEDURE — 99284 EMERGENCY DEPT VISIT MOD MDM: CPT

## 2021-04-27 ASSESSMENT — ENCOUNTER SYMPTOMS
EYES NEGATIVE: 1
RESPIRATORY NEGATIVE: 1
GASTROINTESTINAL NEGATIVE: 1

## 2021-04-27 NOTE — ED NOTES
Discharge instructions reviewed and pt acknowledges understanding. Ambulatory at discharge.      Debbie Powell RN  04/27/21 1954

## 2021-04-27 NOTE — ED PROVIDER NOTES
[place]  The history is provided by the patient. Laceration  Location:  Finger  Finger laceration location:  R thumb  Length:  1 cm  Quality: avulsion    Bleeding: controlled    Laceration mechanism:  Metal edge  Pain details:     Severity:  Mild    Timing:  Constant    Progression:  Unchanged  Foreign body present:  No foreign bodies  Relieved by:  Pressure  Worsened by: Movement  Ineffective treatments:  None tried  Tetanus status:  Up to date  Associated symptoms: no fever, no focal weakness, no numbness, no rash, no redness, no swelling and no streaking        Review of Systems   Constitutional: Negative. Negative for fever. HENT: Negative. Eyes: Negative. Respiratory: Negative. Cardiovascular: Negative. Gastrointestinal: Negative. Genitourinary: Negative. Musculoskeletal: Negative. Skin: Negative. Negative for rash. Neurological: Negative. Negative for focal weakness. All other systems reviewed and are negative.       Family History   Problem Relation Age of Onset    Cancer Mother         Thyroid    Diabetes Mother     Hypertension Mother     Stroke Mother     Thyroid Disease Mother     Kidney Disease Mother     Glaucoma Mother     Thyroid Cancer Mother     Cancer Father     Heart Disease Father     Lung Cancer Father     Obesity Brother     Thyroid Disease Brother     Kidney Disease Brother      Social History     Socioeconomic History    Marital status: Single     Spouse name: Not on file    Number of children: Not on file    Years of education: Not on file    Highest education level: Not on file   Occupational History    Not on file   Social Needs    Financial resource strain: Not on file    Food insecurity     Worry: Not on file     Inability: Not on file    Transportation needs     Medical: Not on file     Non-medical: Not on file   Tobacco Use    Smoking status: Current Every Day Smoker     Packs/day: 0.50     Years: 33.00     Pack years: 16.50 Types: Cigarettes     Start date: 0    Smokeless tobacco: Never Used   Substance and Sexual Activity    Alcohol use: No    Drug use: Not Currently     Types: Methamphetamines, Marijuana     Comment: Not used since 1997    Sexual activity: Yes     Partners: Male   Lifestyle    Physical activity     Days per week: Not on file     Minutes per session: Not on file    Stress: Not on file   Relationships    Social connections     Talks on phone: Not on file     Gets together: Not on file     Attends Buddhist service: Not on file     Active member of club or organization: Not on file     Attends meetings of clubs or organizations: Not on file     Relationship status: Not on file    Intimate partner violence     Fear of current or ex partner: Not on file     Emotionally abused: Not on file     Physically abused: Not on file     Forced sexual activity: Not on file   Other Topics Concern    Not on file   Social History Narrative    Not on file     Past Surgical History:   Procedure Laterality Date    CARDIAC CATHETERIZATION  2009    [place] - ([place]   [address] Bilateral 2003    CHOLECYSTECTOMY  2013   [address] [facility]  12/31/2020    right side    SHOULDER ARTHROSCOPY Right 12/31/2020    RIGHT SHOULDER ARTHROSCOPY, SUBACROMIAL DECOMPRESSION, DISTAL CLAVICLE RESECTION DEBRIDEMENT ROTATOR CUFF REPAIR performed by Keyla Villagomez DO at [address] 12/31/2020    Diagnostic arthroscopy, right shoulder with rotator cuff repair. 2. subacromial decompression 3. distal clavicle resection 4. extensive debridement    TUBAL LIGATION  1991     Past Medical History:   Diagnosis Date    COPD (chronic obstructive pulmonary disease) ([facility] [address]     Denies COPD, uses inhaler for SOB - prn - hx: smoking    H/O echocardiogram 08/28/2018    EF 55-60%. No significant valvular disease.      Hernia, umbilical     History of cardiac monitoring Musculoskeletal: Normal range of motion and neck supple. Cardiovascular:      Rate and Rhythm: Normal rate and regular rhythm. Heart sounds: Normal heart sounds. Pulmonary:      Effort: Pulmonary effort is normal.      Breath sounds: Normal breath sounds. Abdominal:      General: Bowel sounds are normal.      Palpations: Abdomen is soft. Musculoskeletal: Normal range of motion. Right hand: She exhibits laceration. She exhibits normal range of motion, no tenderness, no bony tenderness, normal two-point discrimination, no deformity and no swelling. Normal sensation noted. Normal strength noted. Hands:       Comments: 1 cm laceration (flap) bleeding controlled no neurological deficits and no tendonous injury   Skin:     General: Skin is warm and dry. Neurological:      Mental Status: She is alert and oriented to person, place, and time. GCS: GCS eye subscore is 4. GCS verbal subscore is 5. GCS motor subscore is 6. Psychiatric:         Behavior: Behavior normal.         Thought Content:  Thought content normal.         Judgment: Judgment normal.         MDM:    Labs Reviewed - No data to display    No orders to display    Lac Repair    Date/Time: 4/27/2021 7:12 PM  Performed by: Nick Benavides DO  Authorized by: Nick Benavides DO     Consent:     Consent obtained:  Verbal    Consent given by:  Patient    Risks discussed:  Infection, need for additional repair, nerve damage, poor wound healing, poor cosmetic result, pain, retained foreign body, tendon damage and vascular damage    Alternatives discussed:  No treatment, delayed treatment, referral and observation  Universal protocol:     Procedure explained and questions answered to patient or proxy's satisfaction: yes      Relevant documents present and verified: yes      Test results available and properly labeled: yes      Imaging studies available: yes      Required blood products, implants, devices, and special equipment available: yes      Site/side marked: yes      Immediately prior to procedure, a time out was called: yes      Patient identity confirmed:  Verbally with patient  Anesthesia (see MAR for exact dosages): Anesthesia method:  None  Laceration details:     Location: right thumb. Length (cm):  1  Repair type:     Repair type:  Simple  Exploration:     Hemostasis achieved with:  Direct pressure    Wound exploration: wound explored through full range of motion      Wound extent comment:  Small flap laceration    Contaminated: no    Treatment:     Area cleansed with:  Saline    Amount of cleaning:  Extensive    Irrigation solution:  Sterile saline    Visualized foreign bodies/material removed: no    Skin repair:     Repair method:  Tissue adhesive (Patient doesnt want stitches. She is aware of limitations of tissue adhesive and still wants to proceed)  Post-procedure details:     Patient tolerance of procedure: Tolerated well, no immediate complications          Upon arrival to the emergency department patient thumb is neurovascular intact. My typical dicussion, presentation,and considerations for this patients' chief complaint, diagnosis, and differential diagnosis have been considered and discussed. I have stressed need for follow up and reexamination for this encounter. The patient was informed about plan of care and she doesn't want stitches. She just wants glue The patient  was also told to return to the emergency department if any changes or any concern. Patient  questions and concerns from this visit have been addressed prior to discharge. Patient was not prescribed medication. Final Impression    1.  Laceration of right thumb without foreign body without damage to nail, initial encounter              [address]   04/27/21 1915

## 2021-04-27 NOTE — FLOWSHEET NOTE
Outpatient Physical Therapy  [facility]           [x] Phone: [phone]   Fax: [phone]  Natalie Perrin           [x] Phone: [phone]   Fax: [phone]        Physical Therapy Daily Treatment Note  Date:  2021    Patient Name:  Sheeba Swanson    :  1965  MRN: [id number]  Restrictions/Precautions: Other position/activity restrictions: as of - no AROM R shoulder  Diagnosis:   Diagnosis: R RC repair 20  Date of Injury/Surgery:   Treatment Diagnosis: Treatment Diagnosis: R shoulder pain/R shoulder stiffness/ impaired mobility    Insurance/Certification information: PT Insurance Information: [facility] x C9 for an additional 8  visits, 2 x week for 4 weeks.   From 21-21  Referring Physician:  Referring Practitioner: Benjy Moreno  Next Doctor Visit:    Plan of care signed (Y):    Outcome Measure:  quick DASH 52/55  Visit# / total visits:  then PN     End date 21  Pain level:  5/10 R shoulder   Goals:          Long term goals  Time Frame for Long term goals : 12 weeks  Long term goal 1: patient's goal - regain R shoulder function and return to work  Long term goal 2: patientmwill score 33/55 on Quick DASH indicating improved R UE function with ADL/IADL and or less R UE pain symptoms- initial score = 52/55  Long term goal 3: patient will have 120* of FLEX AROM in an upright position inorder to order for ability to reach above shoulder height for ADL/IADL function  Long term goal 4: patient will be independent with HEP in order to be prepared for discharge    Summary of Evaluation  ASSESSMENT  Patient primary complaints:  R shoulder pain/R shoulder stiffness/ impaired mobility    History of condition:R RC repair 20; - no prior surgeries; sleeping in recliner; out of pain meds- has called surgeon; performing pendulums 2-3 day  Current functional limitations: in sling - requires assist for dressing/ and all IADL  Clinical findings: quick DASH 52/55; PROM R Shoulder Flex  0-180: supine 75* limited by pain; R Shoulder ABduction 0-180: supine 70* limited by pain; R Shoulder Int Rotation  0-70: supine shoulder @ 45* ABD= 25* IR; R Shoulder Ext Rotation  0-90: supine shoulder @ 45* ABD= 15* ER; Cervical: L ROT AROM, SB to R/L all pull in R UT  PLOF:employed as - independent with all necessary ADL/IADL- function was limited/slowed by R RC tear/pain  Skilled PT interventions are intended to: Address ROM deficits which will enable patient  to return to PLOF/employment  Patient agrees with established plan of care and assisted in the development of their  goals  Barriers to learning:none- no mental/cognitive barriers observed  Preferred learning style(s):   written- demonstration -practice  Preferred Language: English  Potential barriers to progress:none  The patient appears motivated to participate in PT and regain PLOF: yes    Subjective:  Patient reports of  5/10 pain upon arrival and continues to c/o discomfort. Any changes in Ambulatory Summary Sheet?   None        Objective:  Supine  PROM ABD WNL    COVID screening questions were asked and patient attested that there had been no contact or symptoms        Exercises: (No more than 4 columns)   Exercise/Equipment 4/23/2021 4/27/2021          WARM UP     pulleys 25x5\" 25x5\"   UBE 3/3 F/B 3/3 F/B   TABLE     Prone HABD Green ball 3x10 Green ball 3x10   Supine shoulder PROM performed     prone shoulder EXT 1# 3x10 1# 3x10   Side ABD 1# 3x10 1# 3x10   Side ER w/towel in arm pit 1# 3x10 1# 3x10   STANDING     Counter top bows     ER with cane  15x5\" 15x5\"   Supine AROM Circles at the wall with ball  20x ea way Circles at the wall with ball  20x ea way   Supine punch/  Flex in beach chair Supine punch 3# 3 x10     Supine Flex 1# 1x10    Supine Circles CW/CCW 1# 1x10 ea way     Supine punch 3# 3 x10     Supine Flex 1# 1x15    Supine Circles CW/CCW 1# 1x10 ea way        IR behind back Ball pass around waist 20x ea way Ball pass around waist 20x  way   [place] chair- punch out/press up Holding ball 3x10 Holding ball 3x10   Core wheel 30x 30x                       MODALITIES See below                  Interventions included  verbal and manual cueing intended to facilitate recruitment and strength of specific muscle groups that are utilized by the patient to perform necessary ADL/IADL. Home Exercise Program:  Patient to add towel flexion and circles at 90* to HEP      Manual Treatments:  Supine PROM;      Modalities:  Patient received vasocompression on their R shoulder  for pain and inflammation for 10min on low pressure. Patient had negative skin reaction afterwards. Communication with other providers:        Assessment:  (Response towards treatment session) (Pain Rating)  Patient did well with all exs today. 6/10 pain at the end of session   Continue to progress strengthening and ROM as tolerated. Plan for Next Session:  focus on PROM R shoulder and scapular strength/mobility. Continue per Protocol.        Time In / Time Out:     0181/6974    If [facility] Please Indicate Time In/Out/Total Time  CPT Code Time in Time out Total Time   exercises   0838     0913        35      vaso  0928       0938       10     manual  0913      0928       15                           Total for session                Timed Code/Total Treatment Minutes:  60 10vaso 15man 35te     Next Progress Note due:        Plan of Care Interventions:  [x] Therapeutic Exercise  [] Modalities:  [x] Therapeutic Activity     [] Ultrasound  [] Estim  [] Gait Training      [] Cervical Traction [] Lumbar Traction  [x] Neuromuscular Re-education    [] Cold/hotpack [] Iontophoresis   [x] Instruction in HEP      [x] Vasopneumatic   [] Dry Needling    [x] Manual Therapy               [] Aquatic Therapy              Electronically signed by:  Tristin Calle     4/27/2021, 8:38 AM

## 2021-04-27 NOTE — ED NOTES
Right thumb laceration was glued, steri strips and finger splint applied, wrapped with coban, patient tolerated well       Noah Zavaleta RN  04/27/21 1953

## 2021-04-30 ENCOUNTER — HOSPITAL ENCOUNTER (OUTPATIENT)
Dept: PHYSICAL THERAPY | Age: 56
Setting detail: THERAPIES SERIES
Discharge: HOME | End: 2021-04-30
Payer: COMMERCIAL

## 2021-04-30 PROCEDURE — 97140 MANUAL THERAPY 1/> REGIONS: CPT

## 2021-04-30 PROCEDURE — 97110 THERAPEUTIC EXERCISES: CPT

## 2021-04-30 PROCEDURE — 97016 VASOPNEUMATIC DEVICE THERAPY: CPT

## 2021-04-30 NOTE — FLOWSHEET NOTE
Outpatient Physical Therapy  [place]           [x] Phone: [phone]   Fax: [phone]  [place]           [x] Phone: [phone]   Fax: [phone]        Physical Therapy Daily Treatment Note  Date:  2021    Patient Name:  Donna Winn    :  1965  MRN: [id number]  Restrictions/Precautions: Other position/activity restrictions: as of - no AROM R shoulder  Diagnosis:   Diagnosis: R RC repair 20  Date of Injury/Surgery:   Treatment Diagnosis: Treatment Diagnosis: R shoulder pain/R shoulder stiffness/ impaired mobility    Insurance/Certification information: PT Insurance Information: [facility] x C9 for an additional 8  visits, 2 x week for 4 weeks.   From 21-21  Referring Physician:  Referring Practitioner: Shaquille Morales  Next Doctor Visit:    Plan of care signed (Y):    Outcome Measure:  quick DASH 52/55  Visit# / total visits:  then PN     End date 21  Pain level:  4/10 R shoulder   Goals:          Long term goals  Time Frame for Long term goals : 12 weeks  Long term goal 1: patient's goal - regain R shoulder function and return to work  Long term goal 2: patientmwill score 33/55 on Quick DASH indicating improved R UE function with ADL/IADL and or less R UE pain symptoms- initial score = 52/55  Long term goal 3: patient will have 120* of FLEX AROM in an upright position inorder to order for ability to reach above shoulder height for ADL/IADL function  Long term goal 4: patient will be independent with HEP in order to be prepared for discharge    Summary of Evaluation  ASSESSMENT  Patient primary complaints:  R shoulder pain/R shoulder stiffness/ impaired mobility    History of condition:R RC repair 20; - no prior surgeries; sleeping in recliner; out of pain meds- has called surgeon; performing pendulums 2-3 day  Current functional limitations: in sling - requires assist for dressing/ and all IADL  Clinical findings: quick DASH 52/55; PROM R Shoulder Flex  0-180: supine 75* limited by pain; R Shoulder ABduction 0-180: supine 70* limited by pain; R Shoulder Int Rotation  0-70: supine shoulder @ 45* ABD= 25* IR; R Shoulder Ext Rotation  0-90: supine shoulder @ 45* ABD= 15* ER; Cervical: L ROT AROM, SB to R/L all pull in R UT  PLOF:employed as - independent with all necessary ADL/IADL- function was limited/slowed by R RC tear/pain  Skilled PT interventions are intended to: Address ROM deficits which will enable patient  to return to PLOF/employment  Patient agrees with established plan of care and assisted in the development of their  goals  Barriers to learning:none- no mental/cognitive barriers observed  Preferred learning style(s):   written- demonstration -practice  Preferred Language: English  Potential barriers to progress:none  The patient appears motivated to participate in PT and regain PLOF: yes    Subjective:  Patient reports of 4/10 pain upon arrival and continues to c/o discomfort. Any changes in Ambulatory Summary Sheet?   None        Objective:  PROM Flex 160*    COVID screening questions were asked and patient attested that there had been no contact or symptoms        Exercises: (No more than 4 columns)   Exercise/Equipment 4/30/2021         WARM UP    pulleys 25x5\"   UBE 3/3 F/B   TABLE    Prone HABD 1# 3x10    prone shoulder EXT 1# 3x10   Side ABD 1# 3x10   Side ER w/towel in arm pit 1# 3x10   STANDING    Counter top bows    ER with cane  15x5\"   Ball on wall circles  CW/CCW 20x ea   Supine punch/flex/circles    Supine punch 3# 3 x10       Flex 1# 1x15     Circles CW/CCW 1# 1x15 ea way        IR behind back Ball pass around waist 20x ea way   Beach chair- punch out/press up Holding ball 3x10   Core wheel 30x   Standing IR w/towel 5x10\"               MODALITIES                Interventions included  verbal and manual cueing intended to facilitate recruitment and strength of specific muscle groups that are utilized by the patient to perform necessary ADL/IADL. Home Exercise Program:  Patient to add towel flexion and circles at 90* to HEP      Manual Treatments:  Supine PROM;      Modalities:  Patient received vasocompression on their R shoulder  for pain and inflammation for 10min on low pressure. Patient had negative skin reaction afterwards. Communication with other providers:        Assessment:  (Response towards treatment session) (Pain Rating)  Patient did well with all exs today. 6/10 pain at the end of session   Continue to progress strengthening and ROM as tolerated. Plan for Next Session:  focus on PROM R shoulder and scapular strength/mobility. Continue per Protocol.        Time In / Time Out:   0940/1046    If [facility] Please Indicate Time In/Out/Total Time  CPT Code Time in Time out Total Time   exercises   0940      1021         41      vaso  1036        1046        10     manual  1021       1036         15                           Total for session                Timed Code/Total Treatment Minutes:  66  41te 15man 10vaso    Next Progress Note due:        Plan of Care Interventions:  [x] Therapeutic Exercise  [] Modalities:  [x] Therapeutic Activity     [] Ultrasound  [] Estim  [] Gait Training      [] Cervical Traction [] Lumbar Traction  [x] Neuromuscular Re-education    [] Cold/hotpack [] Iontophoresis   [x] Instruction in HEP      [x] Vasopneumatic   [] Dry Needling    [x] Manual Therapy               [] Aquatic Therapy              Electronically signed by:  Rigo Enriquez PT    4/30/2021, 9:40 AM

## 2021-05-04 ENCOUNTER — HOSPITAL ENCOUNTER (OUTPATIENT)
Dept: PHYSICAL THERAPY | Age: 56
Setting detail: THERAPIES SERIES
Discharge: HOME | End: 2021-05-04

## 2021-05-04 ENCOUNTER — TELEPHONE (OUTPATIENT)
Dept: FAMILY MEDICINE CLINIC | Age: 56
End: 2021-05-04

## 2021-05-04 DIAGNOSIS — L08.9 SKIN INFECTION: Primary | ICD-10-CM

## 2021-05-04 RX ORDER — DOXYCYCLINE HYCLATE 100 MG
100 TABLET ORAL 2 TIMES DAILY
Qty: 14 TABLET | Refills: 0 | Status: SHIPPED | OUTPATIENT
Start: 2021-05-04 | End: 2022-09-07 | Stop reason: SDUPTHER

## 2021-05-04 NOTE — TELEPHONE ENCOUNTER
Patient called-[facility] on [place] -she cut herself on a pizza cutter about 8 dys-she went to the ER to have it looked-they just put a sealant on it-patient is requesting an antibiotic because she thinks it's getting infected-she doesn't want to come in because she is self pay and she would have to pay for there visit and the medication out of pocket-please advise

## 2021-05-04 NOTE — FLOWSHEET NOTE
Physical Therapy  Cancellation/No-show Note  Patient Name:  Siobhan Rodriguez  :  1965   Date:  2021  Cancelled visits to date: 1  No-shows to date: 0    For today's appointment patient:  [x]  Cancelled  []  Rescheduled appointment  []  No-show     Reason given by patient:  []  Patient ill  []  Conflicting appointment  []  No transportation    []  Conflict with work  []  No reason given  []  Other:     Comments:  Waiting on new C9    Electronically signed by:  Marlene Zapien, PTA

## 2021-05-04 NOTE — TELEPHONE ENCOUNTER
Highly recommend she have it evaluated. I did go ahead and send an antibiotic to [facility]. She needs to clean with mild soap and water. Seek medical attention if not improving. Is she up to date on her tetanus?

## 2021-05-14 ENCOUNTER — HOSPITAL ENCOUNTER (OUTPATIENT)
Dept: PHYSICAL THERAPY | Age: 56
Setting detail: THERAPIES SERIES
Discharge: HOME | End: 2021-05-14
Payer: COMMERCIAL

## 2021-05-14 PROCEDURE — 97016 VASOPNEUMATIC DEVICE THERAPY: CPT

## 2021-05-14 PROCEDURE — 97110 THERAPEUTIC EXERCISES: CPT

## 2021-05-14 PROCEDURE — 97140 MANUAL THERAPY 1/> REGIONS: CPT

## 2021-05-14 NOTE — FLOWSHEET NOTE
Workers comp approved 4 additional PT visits with an end date of 5/29/2021. C-9 scanned and filed in the chart.

## 2021-05-14 NOTE — FLOWSHEET NOTE
Outpatient Physical Therapy  [place]           [x] Phone: [phone]   Fax: [phone]  [facility] [facility]           [x] Phone: [phone]   Fax: [phone]        Physical Therapy Daily Treatment Note  Date:  2021    Patient Name:  Honorio Gonzalez    :  1965  MRN: [id number]  Restrictions/Precautions: Other position/activity restrictions: as of - no AROM R shoulder  Diagnosis:   Diagnosis: R RC repair 20  Date of Injury/Surgery:   Treatment Diagnosis: Treatment Diagnosis: R shoulder pain/R shoulder stiffness/ impaired mobility    Insurance/Certification information: PT Insurance Information: [facility] x C9 for an additional 8  visits, 2 x week for 4 weeks.   From 21-21  Referring Physician:  Referring Practitioner: Gayle Bennett  Next Doctor Visit:    Plan of care signed (Y):    Outcome Measure:  quick DASH 52/55  Visit# / total visits:  then PN     End date 21  Pain level:  4/10 R shoulder   Goals:          Long term goals  Time Frame for Long term goals : 12 weeks  Long term goal 1: patient's goal - regain R shoulder function and return to work  Long term goal 2: patientmwill score 33/55 on Quick DASH indicating improved R UE function with ADL/IADL and or less R UE pain symptoms- initial score = 52/55  Long term goal 3: patient will have 120* of FLEX AROM in an upright position inorder to order for ability to reach above shoulder height for ADL/IADL function  Long term goal 4: patient will be independent with HEP in order to be prepared for discharge    Summary of Evaluation  ASSESSMENT  Patient primary complaints:  R shoulder pain/R shoulder stiffness/ impaired mobility    History of condition:R RC repair 20; - no prior surgeries; sleeping in recliner; out of pain meds- has called surgeon; performing pendulums 2-3 day  Current functional limitations: in sling - requires assist for dressing/ and all IADL  Clinical findings: quick DASH 52/55; PROM R Shoulder Flex  0-180: supine 75* limited by pain; R Shoulder ABduction 0-180: supine 70* limited by pain; R Shoulder Int Rotation  0-70: supine shoulder @ 45* ABD= 25* IR; R Shoulder Ext Rotation  0-90: supine shoulder @ 45* ABD= 15* ER; Cervical: L ROT AROM, SB to R/L all pull in R UT  PLOF:employed as - independent with all necessary ADL/IADL- function was limited/slowed by R RC tear/pain  Skilled PT interventions are intended to: Address ROM deficits which will enable patient  to return to PLOF/employment  Patient agrees with established plan of care and assisted in the development of their  goals  Barriers to learning:none- no mental/cognitive barriers observed  Preferred learning style(s):   written- demonstration -practice  Preferred Language: English  Potential barriers to progress:none  The patient appears motivated to participate in PT and regain PLOF: yes    Subjective:  Patient reports of 4/10 pain. Has been working on her exercises at home. Any changes in Ambulatory Summary Sheet?   None        Objective:  PROM WFL with tightness noted in IR    COVID screening questions were asked and patient attested that there had been no contact or symptoms        Exercises: (No more than 4 columns)   Exercise/Equipment 4/30/2021 5/14/2021          WARM UP     pulleys 25x5\" 25x5\"   UBE 3/3 F/B 2/2 F/B   TABLE     Prone HABD 1# 3x10 1# 3x10    prone shoulder EXT 1# 3x10 1# 3x10   Side ABD 1# 3x10 1# 3x10   Side ER w/towel in arm pit 1# 3x10 1# 3x10   STANDING     Counter top bows     ER with cane  15x5\" 15x5\"   Ball on wall circles  CW/CCW 20x ea CW/CCW 20x ea    Supine punch/flex/circles    Supine punch 3# 3 x10       Flex 1# 1x15     Circles CW/CCW 1# 1x15 ea way Supine Punch 3# 3x10      Flex 1# 1x15    Circles CW/CCW 1# 15x ea way        IR behind back Ball pass around waist 20x ea way Ball pass around waist 20x ea way   Beach chair- punch out/press up Holding ball 3x10 Holding ball 3x10   Core wheel 30x 30x   Standing

## 2021-05-18 ENCOUNTER — HOSPITAL ENCOUNTER (OUTPATIENT)
Dept: PHYSICAL THERAPY | Age: 56
Setting detail: THERAPIES SERIES
Discharge: HOME | End: 2021-05-18
Payer: COMMERCIAL

## 2021-05-18 PROCEDURE — 97110 THERAPEUTIC EXERCISES: CPT

## 2021-05-18 PROCEDURE — 97016 VASOPNEUMATIC DEVICE THERAPY: CPT

## 2021-05-18 PROCEDURE — 97140 MANUAL THERAPY 1/> REGIONS: CPT

## 2021-05-18 NOTE — FLOWSHEET NOTE
Outpatient Physical Therapy  [place]           [x] Phone: [phone]   Fax: [phone]  [facility]           [x] Phone: [phone]   Fax: [phone]        Physical Therapy Daily Treatment Note  Date:  2021    Patient Name:  Avani Banks    :  1965  MRN: [id number]  Restrictions/Precautions: Other position/activity restrictions: as of - no AROM R shoulder  Diagnosis:   Diagnosis: R RC repair 20  Date of Injury/Surgery:   Treatment Diagnosis: Treatment Diagnosis: R shoulder pain/R shoulder stiffness/ impaired mobility    Insurance/Certification information: PT Insurance Information: [facility] x C9 for an additional 8  visits, 2 x week for 4 weeks. From 21-21; Workers comp approved 4 additional PT visits with an end date of 2021  Referring Physician:  Referring Practitioner: Channing Rosales Doctor Visit:    Plan of care signed (Y):    Outcome Measure:  quick DASH 52/55  Visit# / total visits:  then PN     End date 21  Pain level:  4/10 R shoulder   Goals:          Long term goals  Time Frame for Long term goals : 12 weeks  Long term goal 1: patient's goal - regain R shoulder function and return to work  Long term goal 2: patientmwill score 33/55 on Quick DASH indicating improved R UE function with ADL/IADL and or less R UE pain symptoms- initial score = 52/55  Long term goal 3: patient will have 120* of FLEX AROM in an upright position inorder to order for ability to reach above shoulder height for ADL/IADL function  Long term goal 4: patient will be independent with HEP in order to be prepared for discharge    Summary of Evaluation  ASSESSMENT  Patient primary complaints:  R shoulder pain/R shoulder stiffness/ impaired mobility    History of condition:R RC repair 20; - no prior surgeries; sleeping in recliner; out of pain meds- has called surgeon; performing pendulums 2-3 day  Current functional limitations: in sling - requires assist for dressing/ and all IADL  Clinical findings: quick DASH 52/55; PROM R Shoulder Flex  0-180: supine 75* limited by pain; R Shoulder ABduction 0-180: supine 70* limited by pain; R Shoulder Int Rotation  0-70: supine shoulder @ 45* ABD= 25* IR; R Shoulder Ext Rotation  0-90: supine shoulder @ 45* ABD= 15* ER; Cervical: L ROT AROM, SB to R/L all pull in R UT  PLOF:employed as - independent with all necessary ADL/IADL- function was limited/slowed by R RC tear/pain  Skilled PT interventions are intended to: Address ROM deficits which will enable patient  to return to PLOF/employment  Patient agrees with established plan of care and assisted in the development of their  goals  Barriers to learning:none- no mental/cognitive barriers observed  Preferred learning style(s):   written- demonstration -practice  Preferred Language: English  Potential barriers to progress:none  The patient appears motivated to participate in PT and regain PLOF: yes    Subjective:  Patient reports of 4/10 pain upon arrival and voices no new c/o. Patient to start conditioning after she finishes with therapy. Any changes in Ambulatory Summary Sheet?   None        Objective:  PROM WFL with tightness noted in IR    COVID screening questions were asked and patient attested that there had been no contact or symptoms        Exercises: (No more than 4 columns)   Exercise/Equipment 4/30/2021 5/14/2021 5/18/2021           WARM UP      pulleys 25x5\" 25x5\" 25x5\"   UBE 3/3 F/B 2/2 F/B 2/2 F/B   TABLE      Prone HABD 1# 3x10 1# 3x10 1# 3x10    prone shoulder EXT 1# 3x10 1# 3x10 1# 3x10   Side ABD 1# 3x10 1# 3x10 1# 3x10   Side ER w/towel in arm pit 1# 3x10 1# 3x10 1# 3x10   STANDING      Counter top bows      ER with cane  15x5\" 15x5\" 15x5\"   Ball on wall circles  CW/CCW 20x ea CW/CCW 20x ea  CW/CCW 20x ea   Supine punch/flex/circles    Supine punch 3# 3 x10       Flex 1# 1x15     Circles CW/CCW 1# 1x15 ea way Supine Punch 3# 3x10      Flex 1# 1x15    Circles CW/CCW 1# 15x ea way Supine Punch 3# 3x10      Flex 1# 1x15    Circles CW/CCW 1# 15x ea way        IR behind back Ball pass around waist 20x ea way Ball pass around waist 20x ea way Ball pass around waist 20x ea way   Beach chair- punch out/press up Holding ball 3x10 Holding ball 3x10 Holding ball 3x10   Core wheel 30x 30x 30x   Standing IR w/towel 5x10\" 10\"x5 10x5\"                     MODALITIES                      Interventions included  verbal and manual cueing intended to facilitate recruitment and strength of specific muscle groups that are utilized by the patient to perform necessary ADL/IADL. Home Exercise Program:  Patient to add towel flexion and circles at 90* to HEP      Manual Treatments:  Supine PROM;      Modalities:  Patient received vasocompression on their R shoulder  for pain and inflammation for 10min on low pressure. Patient had negative skin reaction afterwards. Communication with other providers:        Assessment:  (Response towards treatment session) (Pain Rating)  Patient rated her pain 5/10 in the shoulder after treatment. Plan for Next Session:  focus on PROM R shoulder and scapular strength/mobility. Continue per Protocol.        Time In / Time Out:   0808/0911    If [facility] Please Indicate Time In/Out/Total Time  CPT Code Time in Time out Total Time   exercises   0808  0846   38     vaso  0901  0911    10     manual  0846  0901    15                            Total for session              Timed Code/Total Treatment Minutes:  63  10vaso 15man  38te    Next Progress Note due:        Plan of Care Interventions:  [x] Therapeutic Exercise  [] Modalities:  [x] Therapeutic Activity     [] Ultrasound  [] Estim  [] Gait Training      [] Cervical Traction [] Lumbar Traction  [x] Neuromuscular Re-education    [] Cold/hotpack [] Iontophoresis   [x] Instruction in HEP      [x] Vasopneumatic   [] Dry Needling    [x] Manual Therapy               [] Aquatic Therapy Electronically signed by:  Bert Snipe Corrinne Seton PT     5/18/2021, 8:08 AM

## 2021-05-21 ENCOUNTER — HOSPITAL ENCOUNTER (OUTPATIENT)
Dept: PHYSICAL THERAPY | Age: 56
Setting detail: THERAPIES SERIES
Discharge: HOME | End: 2021-05-21
Payer: COMMERCIAL

## 2021-05-21 PROCEDURE — 97110 THERAPEUTIC EXERCISES: CPT

## 2021-05-21 NOTE — FLOWSHEET NOTE
Outpatient Physical Therapy  [place]           [x] Phone: [phone]   Fax: [phone]  [place]           [x] Phone: [phone]   Fax: [phone]        Physical Therapy Daily Treatment Note  Date:  2021    Patient Name:  Hilda Vanegas    :  1965  MRN: [id number]  Restrictions/Precautions: Other position/activity restrictions: as of - no AROM R shoulder  Diagnosis:   Diagnosis: R RC repair 20  Date of Injury/Surgery:   Treatment Diagnosis: Treatment Diagnosis: R shoulder pain/R shoulder stiffness/ impaired mobility    Insurance/Certification information: PT Insurance Information: [facility] x C9 for an additional 8  visits, 2 x week for 4 weeks. From 21-21; Workers comp approved 4 additional PT visits with an end date of 2021  Referring Physician:  Referring Practitioner: Otto Halsted  Next Doctor Visit:    Plan of care signed (Y):    Outcome Measure:  quick DASH 52/55  Visit# / total visits: 2/4 then PN     End date 21  Pain level: 5/10 R shoulder   Goals:          Long term goals  Time Frame for Long term goals : 12 weeks  Long term goal 1: patient's goal - regain R shoulder function and return to work  Long term goal 2: patientmwill score 33/55 on Quick DASH indicating improved R UE function with ADL/IADL and or less R UE pain symptoms- initial score = 52/55  Long term goal 3: patient will have 120* of FLEX AROM in an upright position inorder to order for ability to reach above shoulder height for ADL/IADL function  Long term goal 4: patient will be independent with HEP in order to be prepared for discharge    Summary of Evaluation  ASSESSMENT  Patient primary complaints:  R shoulder pain/R shoulder stiffness/ impaired mobility    History of condition:R RC repair 20; - no prior surgeries; sleeping in recliner; out of pain meds- has called surgeon; performing pendulums 2-3 day  Current functional limitations: in sling - requires assist for dressing/ and all IADL  Clinical findings: quick DASH 52/55; PROM R Shoulder Flex  0-180: supine 75* limited by pain; R Shoulder ABduction 0-180: supine 70* limited by pain; R Shoulder Int Rotation  0-70: supine shoulder @ 45* ABD= 25* IR; R Shoulder Ext Rotation  0-90: supine shoulder @ 45* ABD= 15* ER; Cervical: L ROT AROM, SB to R/L all pull in R UT  PLOF:employed as - independent with all necessary ADL/IADL- function was limited/slowed by R RC tear/pain  Skilled PT interventions are intended to: Address ROM deficits which will enable patient  to return to PLOF/employment  Patient agrees with established plan of care and assisted in the development of their  goals  Barriers to learning:none- no mental/cognitive barriers observed  Preferred learning style(s):   written- demonstration -practice  Preferred Language: English  Potential barriers to progress:none  The patient appears motivated to participate in PT and regain PLOF: yes    Subjective:  Patient reports of 5/10 pain upon arrival and States: \"It's been a long day. \"  She reports she cleaned her house today dishes, vacuumed,and mopped. Any changes in Ambulatory Summary Sheet? None        Objective:    Increased reps without an increase in pain    COVID screening questions were asked and patient attested that there had been no contact or symptoms        Exercises: (No more than 4 columns)   Exercise/Equipment 5/21/2021         WARM UP    pulleys 25x5\"   UBE 2/2 F/B   TABLE    Prone HABD 1# 3x10    prone shoulder EXT 1# 3x10   Side ABD 1# 3x10   Side ER w/towel in arm pit 1# 3x10   STANDING    ER with cane  15x5\"   Ball on wall circles  CW/CCW 20x ea   Supine punch/flex/circles    Supine punch 3# 3x10    Flex 1# 2x10        IR behind back Ball pass around waist 20x ea way   Orthodata chair- punch out/press up  --------------   Core wheel 30x   Standing IR w/towel 15x5\"   Seated punch out/press up Holding ball 3x10   Seated ABC's  Holding ball A-Z    Seated circles Holding ball CW/CCW 2x10                           MODALITIES                Interventions included  verbal and manual cueing intended to facilitate recruitment and strength of specific muscle groups that are utilized by the patient to perform necessary ADL/IADL. Home Exercise Program:  Patient to add towel flexion and circles at 90* to HEP      Manual Treatments:  Supine PROM; Not today     Modalities:  Patient received vasocompression on their R shoulder  for pain and inflammation for 10min on low pressure. Patient had negative skin reaction afterwards. - Declined       Communication with other providers:        Assessment:  (Response towards treatment session) (Pain Rating)  Patient rated her pain 5/10 in the shoulder after treatment. Plan for Next Session:  focus on PROM R shoulder and scapular strength/mobility. Continue per Protocol.        Time In / Time Out:    1415/1500    If [facility] Please Indicate Time In/Out/Total Time  CPT Code Time in Time out Total Time   exercises   1415   1500     45     vaso               manual                                       Total for session       45       Timed Code/Total Treatment Minutes:   45te    Next Progress Note due:        Plan of Care Interventions:  [x] Therapeutic Exercise  [] Modalities:  [x] Therapeutic Activity     [] Ultrasound  [] Estim  [] Gait Training      [] Cervical Traction [] Lumbar Traction  [x] Neuromuscular Re-education    [] Cold/hotpack [] Iontophoresis   [x] Instruction in HEP      [x] Vasopneumatic   [] Dry Needling    [x] Manual Therapy               [] Aquatic Therapy              Electronically signed by:  Emily Bella      5/21/2021, 2:15 PM

## 2021-05-25 ENCOUNTER — HOSPITAL ENCOUNTER (OUTPATIENT)
Dept: PHYSICAL THERAPY | Age: 56
Setting detail: THERAPIES SERIES
Discharge: HOME | End: 2021-05-25
Payer: COMMERCIAL

## 2021-05-25 PROCEDURE — 97016 VASOPNEUMATIC DEVICE THERAPY: CPT

## 2021-05-25 PROCEDURE — 97110 THERAPEUTIC EXERCISES: CPT

## 2021-05-25 NOTE — FLOWSHEET NOTE
Outpatient Physical Therapy  [place]           [x] Phone: [phone]   Fax: [phone]  [place]           [x] Phone: [phone]   Fax: [phone]        Physical Therapy Daily Treatment Note  Date:  2021    Patient Name:  Mookie Agustin    :  1965  MRN: [id number]  Restrictions/Precautions: Other position/activity restrictions: as of - no AROM R shoulder  Diagnosis:   Diagnosis: R RC repair 20  Date of Injury/Surgery:   Treatment Diagnosis: Treatment Diagnosis: R shoulder pain/R shoulder stiffness/ impaired mobility    Insurance/Certification information: PT Insurance Information: [facility] x C9 for an additional 8  visits, 2 x week for 4 weeks. From 21-21; Workers comp approved 4 additional PT visits with an end date of 2021  Referring Physician:  Referring Practitioner: Cheri Tai  Next Doctor Visit:    Plan of care signed (Y):    Outcome Measure:  quick DASH 52/55  Visit# / total visits: 3/4 then PN     End date 21  Pain level: 4/10 R shoulder   Goals:          Long term goals  Time Frame for Long term goals : 12 weeks  Long term goal 1: patient's goal - regain R shoulder function and return to work  Long term goal 2: patientmwill score 33/55 on Quick DASH indicating improved R UE function with ADL/IADL and or less R UE pain symptoms- initial score = 52/55  Long term goal 3: patient will have 120* of FLEX AROM in an upright position inorder to order for ability to reach above shoulder height for ADL/IADL function  Long term goal 4: patient will be independent with HEP in order to be prepared for discharge    Summary of Evaluation  ASSESSMENT  Patient primary complaints:  R shoulder pain/R shoulder stiffness/ impaired mobility    History of condition:R RC repair 20; - no prior surgeries; sleeping in recliner; out of pain meds- has called surgeon; performing pendulums 2-3 day  Current functional limitations: in sling - requires assist for dressing/ and all IADL  Clinical findings: quick DASH 52/55; PROM R Shoulder Flex  0-180: supine 75* limited by pain; R Shoulder ABduction 0-180: supine 70* limited by pain; R Shoulder Int Rotation  0-70: supine shoulder @ 45* ABD= 25* IR; R Shoulder Ext Rotation  0-90: supine shoulder @ 45* ABD= 15* ER; Cervical: L ROT AROM, SB to R/L all pull in R UT  PLOF:employed as - independent with all necessary ADL/IADL- function was limited/slowed by R RC tear/pain  Skilled PT interventions are intended to: Address ROM deficits which will enable patient  to return to PLOF/employment  Patient agrees with established plan of care and assisted in the development of their  goals  Barriers to learning:none- no mental/cognitive barriers observed  Preferred learning style(s):   written- demonstration -practice  Preferred Language: English  Potential barriers to progress:none  The patient appears motivated to participate in PT and regain PLOF: yes    Subjective: patient reports she has not had her work conditioning set up yet    Any changes in Ambulatory Summary Sheet?   None        Objective: IR behind back to L3;upright FLEX AROM 138*; Quick DASH 28/55  COVID screening questions were asked and patient attested that there had been no contact or symptoms        Exercises: (No more than 4 columns)   Exercise/Equipment 5/21/2021 5/25/21          WARM UP     pulleys 25x5\" 30 x 5\"   UBE 2/2 F/B 3/3 F/B   TABLE     Prone HABD 1# 3x10 1# 3x10    prone shoulder EXT 1# 3x10 1# 3x10   Side ABD 1# 3x10 2# 3x10   Side ER w/towel in arm pit 1# 3x10 1# x10 2# 2 x 10   STANDING     ER with cane  15x5\" 15x5\"   Ball on wall circles  CW/CCW 20x ea -----   Supine punch/flex/circles    Supine punch 3# 3x10    Flex 1# 2x10 Supine punch 3# 3x10        IR behind back Ball pass around waist 20x ea way Ball pass around waist 20x ea way   Beach chair FLEX  1# 3  X [address] chair- punch out/press up  -------------- Holding green ball   Core wheel 30x 30 x Standing IR w/towel 15x5\" 15x5\"   Seated punch out/press up Holding ball 3x10 ------   Seated ABC's  Holding ball A-Z  Holding ball A-Z    Seated circles  Holding ball CW/CCW 2x10 Holding ball CW/CCW 2x10                                 MODALITIES                   Interventions included  verbal and manual cueing intended to facilitate recruitment and strength of specific muscle groups that are utilized by the patient to perform necessary ADL/IADL. Home Exercise Program:  Patient to add towel flexion and circles at 90* to HEP      Manual Treatments:  Supine PROM; Not today     Modalities:  Patient received vasocompression on their R shoulder  for pain and inflammation for 10min on low pressure. Patient had negative skin reaction afterwards. - Declined       Communication with other providers:        Assessment:  (Response towards treatment session) (Pain Rating)  Patient rated her pain 5/10 in the shoulder after treatment.      Plan for Next Session:  PT to stop @ this facility - patient to transition to work conditining       Time In / Time Out:    3531-2866    If [facility] Please Indicate Time In/Out/Total Time  CPT Code Time in Time out Total Time   exercises   0909   950      41   vaso  950   1000       10                                          Total for session       51       Timed Code/Total Treatment Minutes:   41 te x3 vaso x10'    Next Progress Note due:        Plan of Care Interventions:  [x] Therapeutic Exercise  [] Modalities:  [x] Therapeutic Activity     [] Ultrasound  [] Estim  [] Gait Training      [] Cervical Traction [] Lumbar Traction  [x] Neuromuscular Re-education    [] Cold/hotpack [] Iontophoresis   [x] Instruction in HEP      [x] Vasopneumatic   [] Dry Needling    [x] Manual Therapy               [] Aquatic Therapy              Electronically signed by:  Fidencio Chaudhry PT,     5/25/2021, 9:09 AM

## 2021-06-10 NOTE — PROGRESS NOTES
Outpatient Physical Therapy           [place]           [] Phone: [phone]   Fax: [phone]  [place]           [x] Phone: [phone]   Fax: [phone]       Referring Practitioner: Jermaine Moyer                                        From: Aleks Jacobo PT             Patient: Dilan Mathews                                                              : 1965  Diagnosis: Diagnosis: R RC repair 20              Treatment Diagnosis: Treatment Diagnosis: R shoulder pain/R shoulder stiffness/ impaired mobility   []  Progress Note                [x]  Discharge Note    Evaluation Date:  21   Total Visits to date:   27 Cancels/No-shows to date:      Subjective: patient reports she has not had her work conditioning set up yet        Plan of Care/Treatment to date:  [x] Therapeutic Exercise    [] Modalities:  [x] Therapeutic Activity     [] Ultrasound  [] Electrical Stimulation  [] Gait Training      [] Cervical Traction   [] Lumbar Traction  [x] Neuromuscular Re-education  [] Cold/hotpack [] Iontophoresis  [x] Instruction in HEP      Other:  [x] Manual Therapy       [x]  Vasopneumatic  [] Aquatic Therapy       []   Dry Needle Therapy                      Objective/Significant Findings At Last Visit/Comments:    IR behind back to L3;upright FLEX AROM 138*; Quick DASH 28/55  Assessment:Goal Status:  [x] Achieved [] Partially Achieved  [x] Not Achieved   Long term goal 1: patient's goal - regain R shoulder function and return to work  Long term goal 2: patientmwill score 33/55 on Quick DASH indicating improved R UE function with ADL/IADL and or less R UE pain symptoms- initial score = 52/55  Long term goal 3: patient will have 120* of FLEX AROM in an upright position inorder to order for ability to reach above shoulder height for ADL/IADL function  Long term goal 4: patient will be independent with HEP in order to be prepared for discharge             [x] Patient now discharged-to begin work conditoining      Electronically signed by:  Vishal Wheeler, PT, PT, 6/10/2021, 3:58 PM    If you have any questions or concerns, please don't hesitate to call.   Thank you for your referral.

## 2021-08-02 DIAGNOSIS — E03.9 ACQUIRED HYPOTHYROIDISM: ICD-10-CM

## 2021-08-02 DIAGNOSIS — E78.5 HYPERLIPIDEMIA LDL GOAL <130: ICD-10-CM

## 2021-08-02 DIAGNOSIS — I10 ESSENTIAL HYPERTENSION: ICD-10-CM

## 2021-08-02 RX ORDER — LISINOPRIL 10 MG/1
10 TABLET ORAL DAILY
Qty: 90 TABLET | Refills: 3 | Status: SHIPPED | OUTPATIENT
Start: 2021-08-02 | End: 2022-06-22

## 2021-08-02 RX ORDER — LEVOTHYROXINE SODIUM 0.1 MG/1
100 TABLET ORAL DAILY
Qty: 90 TABLET | Refills: 3 | Status: SHIPPED | OUTPATIENT
Start: 2021-08-02 | End: 2022-06-22

## 2021-08-02 RX ORDER — EZETIMIBE 10 MG/1
10 TABLET ORAL DAILY
Qty: 90 TABLET | Refills: 3 | Status: SHIPPED | OUTPATIENT
Start: 2021-08-02 | End: 2022-08-03 | Stop reason: SDUPTHER

## 2021-09-08 ENCOUNTER — TELEPHONE (OUTPATIENT)
Dept: FAMILY MEDICINE CLINIC | Age: 56
End: 2021-09-08

## 2021-09-08 NOTE — TELEPHONE ENCOUNTER
Patient returned call I gave her Sania's response. Patient was given the number to the walk in clinic.

## 2021-09-08 NOTE — TELEPHONE ENCOUNTER
I believe the walk in clinic may be doing throat swabs. I am not sure about the hospital drive thru testing.

## 2021-09-08 NOTE — TELEPHONE ENCOUNTER
----- Message from Jean Cobian sent at 9/8/2021  9:35 AM EDT -----  Subject: Message to Provider    QUESTIONS  Information for Provider? Patient wanting orders for a throat swab covid   test, she does not want to do a nose swab.  ---------------------------------------------------------------------------  --------------  CALL BACK INFO  What is the best way for the office to contact you? OK to leave message on   voicemail  Preferred Call Back Phone Number? [phone]  ---------------------------------------------------------------------------  --------------  SCRIPT ANSWERS  Relationship to Patient?  Self

## 2021-09-09 ENCOUNTER — OFFICE VISIT (OUTPATIENT)
Dept: INTERNAL MEDICINE CLINIC | Age: 56
End: 2021-09-09

## 2021-09-09 DIAGNOSIS — R05.9 COUGH: Primary | ICD-10-CM

## 2021-09-09 PROCEDURE — 99213 OFFICE O/P EST LOW 20 MIN: CPT | Performed by: NURSE PRACTITIONER

## 2021-09-09 NOTE — PROGRESS NOTES
9/9/2021    HPI:  Chief complaint and history of present illness as per medical assistant/nurse documented today in the Flu/COVID-19 clinic. MEDICATIONS:  Prior to Visit Medications    Medication Sig Taking? Authorizing Provider   lisinopril (PRINIVIL;ZESTRIL) 10 MG tablet Take 1 tablet by mouth daily  HUMA Ruth CNP   levothyroxine (SYNTHROID) 100 MCG tablet Take 1 tablet by mouth Daily  HUMA Ruth - CNP   ezetimibe (ZETIA) 10 MG tablet Take 1 tablet by mouth daily  HUMA Ruth - MARIELOS   HYDROcodone-acetaminophen (NORCO) 5-325 MG per tablet Take 1 tablet by mouth every 6 hours as needed for Pain (taking only 2x's a week). Historical Provider, MD   aspirin 81 MG chewable tablet Take 81 mg by mouth daily  Historical Provider, MD       Allergies   Allergen Reactions    Keflex [Cephalexin]      Shut kidneys down    Meloxicam Shortness Of Breath    Penicillins Anaphylaxis    Statins Swelling     Throat swelling, vomiting    Cephalosporins Itching    Sulfa Antibiotics Itching    Tramadol Itching    Aspirin     Codeine Hives and Nausea And Vomiting    Naproxen Nausea And Vomiting     Dizzy lightheaded   ,   Past Medical History:   Diagnosis Date    COPD [facility]chronic obstructive pulmonary disease) ([facility])     Denies COPD, uses inhaler for SOB - prn - hx: smoking    H/O echocardiogram 08/28/2018    EF 55-60%. No significant valvular disease.  Hernia, umbilical     History of cardiac monitoring 07/31/2018    Conclusion: 30 days event monitor suggesting sinus rhythm with symptomatic sinus tachycardia.   So the symptoms are reported during normal rate and rhythm    Hyperlipidemia LDL goal <130 7/31/2018    Hypertension     Follows with PCP    Hypothyroidism     Prolonged emergence from general anesthesia     Tachycardia     Wears partial dentures    ,   Past Surgical History:   Procedure Laterality Date    CARDIAC CATHETERIZATION  2009    WNL per pt - ([facility])   [address] [facility] RELEASE Bilateral 2003    CHOLECYSTECTOMY  2013    ECTOPIC PREGNANCY SURGERY  1991    ROTATOR CUFF REPAIR  12/31/2020    right side    SHOULDER ARTHROSCOPY Right 12/31/2020    RIGHT SHOULDER ARTHROSCOPY, SUBACROMIAL DECOMPRESSION, DISTAL CLAVICLE RESECTION DEBRIDEMENT ROTATOR CUFF REPAIR performed by Latanya Su DO at [facility] Right 12/31/2020    Diagnostic arthroscopy, right shoulder with rotator cuff repair.   2. subacromial decompression 3. distal clavicle resection 4. extensive debridement    TUBAL LIGATION  1991   ,   Social History     Tobacco Use    Smoking status: Current Every Day Smoker     Packs/day: 0.50     Years: 33.00     Pack years: 16.50     Types: Cigarettes     Start date: 0    Smokeless tobacco: Never Used   Vaping Use    Vaping Use: Never used   Substance Use Topics    Alcohol use: No    Drug use: Not Currently     Types: Methamphetamines, Marijuana     Comment: Not used since 1997   ,   Family History   Problem Relation Age of Onset   Benito Gray Cancer Mother         Thyroid    Diabetes Mother     Hypertension Mother    Benito Gray Stroke Mother     Thyroid Disease Mother     Kidney Disease Mother     Glaucoma Mother     Thyroid Cancer Mother     Cancer Father     Heart Disease Father     Lung Cancer Father     Obesity Brother     Thyroid Disease Brother     Kidney Disease Brother    ,   Immunization History   Administered Date(s) Administered    Influenza, Quadv, IM, PF (6 mo and older Fluzone, Flulaval, Fluarix, and 3 yrs and older Afluria) 12/10/2018    Pneumococcal Polysaccharide ([id number]) 05/14/2018    Tdap (Boostrix, Adacel) 09/01/2014, 09/01/2015   ,   Health Maintenance   Topic Date Due    COVID-19 Vaccine (1) Never done    Cervical cancer screen  Never done    Shingles Vaccine (1 of 2) Never done    Breast cancer screen  02/22/2020    Flu vaccine (1) 09/01/2021    TSH testing  02/19/2022    Potassium monitoring  02/19/2022    Creatinine monitoring  02/19/2022    A1C test (Diabetic or Prediabetic)  03/23/2022    Colon cancer screen fecal DNA test (Cologuard)  02/25/2023    Lipid screen  02/12/2025    DTaP/Tdap/Td vaccine (3 - Td or Tdap) 09/01/2025    Pneumococcal 0-64 years Vaccine (2 of 2 - PPSV23) 09/02/2030    Hepatitis C screen  Completed    HIV screen  Completed    Hepatitis A vaccine  Aged Out    Hepatitis B vaccine  Aged Out    Hib vaccine  Aged Out    Meningococcal (ACWY) vaccine  Aged Out       PHYSICAL EXAM:  Physical Exam  Temp:  97.5  Heart Rate:  77    Pulse Ox:  99    Constitutional:  Well developed, well nourished  HENT:  Normocephalic, atraumatic, bilateral external ears normal, bilateral TMs normal, oropharynx moist, nose normal  Eyes:  conjunctiva normal, no discharge, no scleral icterus  Cardiovascular:  Normal heart rate, normal rhythm, no murmurs, gallops or rubs  Thorax & Lungs:  Normal breath sounds, no respiratory distress, no wheezing  Skin:  Warm, dry, no erythema, no rash  Neurologic:  Alert & oriented   Psychiatric:  Affect normal, mood normal    ASSESSMENT/PLAN:  1. Cough  Pt reports that she has had a non productive cough, sore throat, HA, N/V/D. All s/s are improving. S/S for last 5 days. - Covid-19 Ambulatory      FOLLOW-UP:  No follow-ups on file.     In addition to other information, the printed after visit summary provided to the patient includes:  [x] COVID-19 Self care instructions  [x] COVID-19 General patient information    HUMA Mary - CNP

## 2021-09-09 NOTE — PATIENT INSTRUCTIONS
Your COVID 19 test can take 1-5 days for the results to come back. We ask that you make a Mychart page and view your test results this way. You will need to Self quarantine until you know your results. Increase fluids and rest  Saline nasal spray as needed for nasal congestion  Warm salt gargles as needed for throat discomfort  Monitor temperature twice a day  Tylenol as needed for fevers and/or discomfort. Big deep breaths periodically throughout the day  Regular Mucinex over the counter as needed for chest congestion  If symptoms worsen -Go to the ER. Follow up with your primary care provider      To Whom it May Concern:    Donnell Wall was tested for COVID-19 9/9/2021. He/she must stay home until test results are back. If test is positive, he/she must quarantine for a total of 10 days starting from day one of symptom onset. He/she must also be fever-free for 24 hours at that time, and also have improvement in symptoms. We do not recommend retesting as patients may continue to test positive for months even though no longer contagious. It is suggested you call [address] or  [place] with any questions regarding quarantine timeframe/return to work/school details.

## 2021-09-11 LAB — SARS-COV-2: NOT DETECTED

## 2021-09-22 ENCOUNTER — TELEPHONE (OUTPATIENT)
Dept: FAMILY MEDICINE CLINIC | Age: 56
End: 2021-09-22

## 2021-09-22 NOTE — TELEPHONE ENCOUNTER
Spoke with pt and she advised that she is having L sided rib pain that radiates to her back x 2 days. Denies arm pain, jaw pain CP or any other symptoms. Advised pt that she could go to the walkin clinic and if s/s become worse then she can go to ER. Pt verbalized understanding.

## 2021-09-23 ENCOUNTER — HOSPITAL ENCOUNTER (EMERGENCY)
Age: 56
Discharge: HOME | End: 2021-09-24
Attending: EMERGENCY MEDICINE

## 2021-09-23 ENCOUNTER — APPOINTMENT (OUTPATIENT)
Dept: CT IMAGING | Age: 56
End: 2021-09-23

## 2021-09-23 VITALS
WEIGHT: 214 LBS | TEMPERATURE: 97.8 F | RESPIRATION RATE: 18 BRPM | SYSTOLIC BLOOD PRESSURE: 138 MMHG | HEART RATE: 80 BPM | DIASTOLIC BLOOD PRESSURE: 90 MMHG | BODY MASS INDEX: 34.39 KG/M2 | HEIGHT: 66 IN | OXYGEN SATURATION: 96 %

## 2021-09-23 DIAGNOSIS — R10.12 LEFT UPPER QUADRANT ABDOMINAL PAIN: Primary | ICD-10-CM

## 2021-09-23 PROCEDURE — 96374 THER/PROPH/DIAG INJ IV PUSH: CPT

## 2021-09-23 PROCEDURE — 84484 ASSAY OF TROPONIN QUANT: CPT

## 2021-09-23 PROCEDURE — 83690 ASSAY OF LIPASE: CPT

## 2021-09-23 PROCEDURE — 74176 CT ABD & PELVIS W/O CONTRAST: CPT

## 2021-09-23 PROCEDURE — 85025 COMPLETE CBC W/AUTO DIFF WBC: CPT

## 2021-09-23 PROCEDURE — 82248 BILIRUBIN DIRECT: CPT

## 2021-09-23 PROCEDURE — 80053 COMPREHEN METABOLIC PANEL: CPT

## 2021-09-23 PROCEDURE — 99283 EMERGENCY DEPT VISIT LOW MDM: CPT

## 2021-09-23 PROCEDURE — 81001 URINALYSIS AUTO W/SCOPE: CPT

## 2021-09-23 ASSESSMENT — PAIN DESCRIPTION - ORIENTATION: ORIENTATION: LEFT

## 2021-09-23 ASSESSMENT — PAIN DESCRIPTION - PAIN TYPE: TYPE: ACUTE PAIN

## 2021-09-23 ASSESSMENT — PAIN SCALES - GENERAL: PAINLEVEL_OUTOF10: 7

## 2021-09-23 ASSESSMENT — PAIN DESCRIPTION - LOCATION: LOCATION: ABDOMEN

## 2021-09-24 ENCOUNTER — TELEPHONE (OUTPATIENT)
Dept: FAMILY MEDICINE CLINIC | Age: 56
End: 2021-09-24

## 2021-09-24 LAB
ALBUMIN SERPL-MCNC: 4.2 GM/DL (ref 3.4–5)
ALP BLD-CCNC: 77 IU/L (ref 40–129)
ALT SERPL-CCNC: 22 U/L (ref 10–40)
ANION GAP SERPL CALCULATED.3IONS-SCNC: 13 MMOL/L (ref 4–16)
AST SERPL-CCNC: 30 IU/L (ref 15–37)
BACTERIA: NEGATIVE /HPF
BASOPHILS ABSOLUTE: 0.1 K/CU MM
BASOPHILS RELATIVE PERCENT: 0.7 % (ref 0–1)
BILIRUB SERPL-MCNC: 0.4 MG/DL (ref 0–1)
BILIRUBIN DIRECT: 0.2 MG/DL (ref 0–0.3)
BILIRUBIN URINE: NEGATIVE MG/DL
BILIRUBIN, INDIRECT: 0.2 MG/DL (ref 0–0.7)
BLOOD, URINE: ABNORMAL
BUN BLDV-MCNC: 9 MG/DL (ref 6–23)
CALCIUM SERPL-MCNC: 9.5 MG/DL (ref 8.3–10.6)
CAST TYPE: ABNORMAL /HPF
CHLORIDE BLD-SCNC: 102 MMOL/L (ref 99–110)
CLARITY: ABNORMAL
CO2: 22 MMOL/L (ref 21–32)
COLOR: YELLOW
CREAT SERPL-MCNC: 0.9 MG/DL (ref 0.6–1.1)
CRYSTAL TYPE: ABNORMAL /HPF
DIFFERENTIAL TYPE: ABNORMAL
EKG ATRIAL RATE: 69 BPM
EKG DIAGNOSIS: NORMAL
EKG P AXIS: 41 DEGREES
EKG P-R INTERVAL: 150 MS
EKG Q-T INTERVAL: 410 MS
EKG QRS DURATION: 76 MS
EKG QTC CALCULATION (BAZETT): 439 MS
EKG R AXIS: 44 DEGREES
EKG T AXIS: 67 DEGREES
EKG VENTRICULAR RATE: 69 BPM
EOSINOPHILS ABSOLUTE: 0.3 K/CU MM
EOSINOPHILS RELATIVE PERCENT: 3 % (ref 0–3)
EPITHELIAL CELLS, UA: ABNORMAL /HPF
GFR AFRICAN AMERICAN: >60 ML/MIN/1.73M2
GFR NON-AFRICAN AMERICAN: >60 ML/MIN/1.73M2
GLUCOSE BLD-MCNC: 93 MG/DL (ref 70–99)
GLUCOSE, URINE: NEGATIVE MG/DL
HCT VFR BLD CALC: 43.2 % (ref 37–47)
HEMOGLOBIN: 14.7 GM/DL (ref 12.5–16)
IMMATURE NEUTROPHIL %: 0.2 % (ref 0–0.43)
KETONES, URINE: NEGATIVE MG/DL
LEUKOCYTE ESTERASE, URINE: ABNORMAL
LIPASE: 20 IU/L (ref 13–60)
LYMPHOCYTES ABSOLUTE: 3.9 K/CU MM
LYMPHOCYTES RELATIVE PERCENT: 39.2 % (ref 24–44)
MCH RBC QN AUTO: 29.9 PG (ref 27–31)
MCHC RBC AUTO-ENTMCNC: 34 % (ref 32–36)
MCV RBC AUTO: 87.8 FL (ref 78–100)
MONOCYTES ABSOLUTE: 0.6 K/CU MM
MONOCYTES RELATIVE PERCENT: 6.4 % (ref 0–4)
MUCUS: NEGATIVE HPF
NITRITE URINE, QUANTITATIVE: NEGATIVE
PDW BLD-RTO: 12.4 % (ref 11.7–14.9)
PH, URINE: 6 (ref 5–8)
PLATELET # BLD: 225 K/CU MM (ref 140–440)
PMV BLD AUTO: 10.3 FL (ref 7.5–11.1)
POTASSIUM SERPL-SCNC: 4.7 MMOL/L (ref 3.5–5.1)
PROTEIN UA: NEGATIVE MG/DL
RBC # BLD: 4.92 M/CU MM (ref 4.2–5.4)
RBC URINE: ABNORMAL /HPF (ref 0–6)
SEGMENTED NEUTROPHILS ABSOLUTE COUNT: 5 K/CU MM
SEGMENTED NEUTROPHILS RELATIVE PERCENT: 50.5 % (ref 36–66)
SODIUM BLD-SCNC: 137 MMOL/L (ref 135–145)
SPECIFIC GRAVITY UA: <1.005 (ref 1–1.03)
TOTAL IMMATURE NEUTOROPHIL: 0.02 K/CU MM
TOTAL PROTEIN: 7.6 GM/DL (ref 6.4–8.2)
TROPONIN T: <0.01 NG/ML
UROBILINOGEN, URINE: 0.2 MG/DL (ref 0.2–1)
VOLUME, (UVOL): 12 ML (ref 10–12)
WBC # BLD: 9.9 K/CU MM (ref 4–10.5)
WBC UA: ABNORMAL /HPF (ref 0–5)

## 2021-09-24 PROCEDURE — 6360000002 HC RX W HCPCS: Performed by: EMERGENCY MEDICINE

## 2021-09-24 PROCEDURE — 93005 ELECTROCARDIOGRAM TRACING: CPT | Performed by: EMERGENCY MEDICINE

## 2021-09-24 PROCEDURE — 93010 ELECTROCARDIOGRAM REPORT: CPT | Performed by: INTERNAL MEDICINE

## 2021-09-24 RX ORDER — KETOROLAC TROMETHAMINE 15 MG/ML
15 INJECTION, SOLUTION INTRAMUSCULAR; INTRAVENOUS ONCE
Status: COMPLETED | OUTPATIENT
Start: 2021-09-24 | End: 2021-09-24

## 2021-09-24 RX ADMIN — KETOROLAC TROMETHAMINE 15 MG: 15 INJECTION, SOLUTION INTRAMUSCULAR; INTRAVENOUS at 00:41

## 2021-09-24 ASSESSMENT — PAIN SCALES - GENERAL: PAINLEVEL_OUTOF10: 7

## 2021-09-24 NOTE — ED PROVIDER NOTES
CHIEF COMPLAINT    Chief Complaint   Patient presents with    Abdominal Pain     Left side under left breast, ongoing for 2 days      HPI  Inessa Man is a 64 y.o. female with history of hypertension, lipidemia, hypothyroidism, questionable COPD who presents to the ED with complaints of pain to left upper quadrant with radiation to left side and left flank. Pain has been present since yesterday morning. The pain described as a throbbing and pressure type pain rated 7/10. The pain is exacerbated with sitting and laying flat. The pain seems to be improved with standing upright. Today she has noticed some associated belching. Patient has never experienced symptoms such as this in the past.  No previous abdominal surgeries. Denies fevers, chills, chest pain, shortness of breath, dizziness, headache, vomiting, diarrhea, constipation. REVIEW OF SYSTEMS  Constitutional: No fever, chills or recent illness. Eye: No visual changes  HENT: No earache or sore throat. Resp: No SOB or productive cough. Cardio: No chest pain or palpitations. GI: Complains of left upper quadrant abdominal pain  : No dysuria, urgency or frequency. Endocrine: No heat intolerance, no cold intolerance, no polydipsia   Lymphatics: No adenopathy  Musculoskeletal: No new muscle aches or joint pain. Neuro: No headaches. Psych: No homicidal or suicidal thoughts  Skin: No rash, No itching. ?  ? PAST MEDICAL HISTORY  Past Medical History:   Diagnosis Date    COPD (chronic obstructive pulmonary disease) ([facility] [address]     Denies COPD, uses inhaler for SOB - prn - hx: smoking    H/O echocardiogram 08/28/2018    EF 55-60%. No significant valvular disease.  Hernia, umbilical     History of cardiac monitoring 07/31/2018    Conclusion: 30 days event monitor suggesting sinus rhythm with symptomatic sinus tachycardia.   So the symptoms are reported during normal rate and rhythm    Hyperlipidemia LDL goal <130 7/31/2018    Hypertension Follows with PCP    Hypothyroidism     Prolonged emergence from general anesthesia     Tachycardia     Wears partial dentures      FAMILY HISTORY  Family History   Problem Relation Age of Onset    Cancer Mother         Thyroid    Diabetes Mother     Hypertension Mother     Stroke Mother     Thyroid Disease Mother     Kidney Disease Mother     Glaucoma Mother     Thyroid Cancer Mother     Cancer Father     Heart Disease Father     Lung Cancer Father     Obesity Brother     Thyroid Disease Brother     Kidney Disease Brother      SOCIAL HISTORY  Social History     Socioeconomic History    Marital status: Single     Spouse name: None    Number of children: None    Years of education: None    Highest education level: None   Occupational History    None   Tobacco Use    Smoking status: Current Every Day Smoker     Packs/day: 0.50     Years: 33.00     Pack years: 16.50     Types: Cigarettes     Start date: 1977    Smokeless tobacco: Never Used   Vaping Use    Vaping Use: Never used   Substance and Sexual Activity    Alcohol use: No    Drug use: Not Currently     Types: Methamphetamines, Marijuana     Comment: Not used since 1997    Sexual activity: Yes     Partners: Male   Other Topics Concern    None   Social History Narrative    None     Social Determinants of Health     Financial Resource Strain:     Difficulty of Paying Living Expenses:    Food Insecurity:     Worried About Running Out of Food in the Last Year:     [address] in the Last Year:    Transportation Needs:     Lack of Transportation (Medical):      Lack of Transportation (Non-Medical):    Physical Activity:     Days of Exercise per Week:     Minutes of Exercise per Session:    Stress:     Feeling of Stress :    Social Connections:     Frequency of Communication with Friends and Family:     Frequency of Social Gatherings with Friends and Family:     Attends Anabaptist Services:     Active Member of Clubs or Organizations:    [address] or Organization Meetings:     Marital Status:    Intimate Partner Violence:     Fear of Current or Ex-Partner:     Emotionally Abused:     Physically Abused:     Sexually Abused:        SURGICAL HISTORY  Past Surgical History:   Procedure Laterality Date    CARDIAC CATHETERIZATION  2009    [place] - [place]   [address] Bilateral 2003    CHOLECYSTECTOMY  2013    ECTOPIC PREGNANCY SURGERY  1991    ROTATOR CUFF REPAIR  12/31/2020    right side    SHOULDER ARTHROSCOPY Right 12/31/2020    RIGHT SHOULDER ARTHROSCOPY, SUBACROMIAL DECOMPRESSION, DISTAL CLAVICLE RESECTION DEBRIDEMENT ROTATOR CUFF REPAIR performed by Patience Jacques DO at [facility] Right 12/31/2020    Diagnostic arthroscopy, right shoulder with rotator cuff repair. 2. subacromial decompression 3. distal clavicle resection 4. extensive debridement    TUBAL LIGATION  1991     CURRENT MEDICATIONS  Previous Medications    ASPIRIN 81 MG CHEWABLE TABLET    Take 81 mg by mouth daily    EZETIMIBE (ZETIA) 10 MG TABLET    Take 1 tablet by mouth daily    HYDROCODONE-ACETAMINOPHEN (NORCO) 5-325 MG PER TABLET    Take 1 tablet by mouth every 6 hours as needed for Pain (taking only 2x's a week).     LEVOTHYROXINE (SYNTHROID) 100 MCG TABLET    Take 1 tablet by mouth Daily    LISINOPRIL (PRINIVIL;ZESTRIL) 10 MG TABLET    Take 1 tablet by mouth daily     ALLERGIES  Allergies   Allergen Reactions    Keflex [Cephalexin]      Shut kidneys down    Meloxicam Shortness Of Breath    Penicillins Anaphylaxis    Statins Swelling     Throat swelling, vomiting    Cephalosporins Itching    Sulfa Antibiotics Itching    Tramadol Itching    Aspirin     Codeine Hives and Nausea And Vomiting    Naproxen Nausea And Vomiting     Dizzy lightheaded       Nursing notes reviewed by myself for past medical history, family history, social history, surgical history, current medications, and allergies. PHYSICAL EXAM  VITAL SIGNS: Triage VS:    ED Triage Vitals   Enc Vitals Group      BP       Pulse       Resp       Temp       Temp src       SpO2       Weight       Height       Head Circumference       Peak Flow       Pain Score       Pain Loc       Pain Edu? Excl. in [address]? Constitutional: Well developed, Well nourished, appears slightly uncomfortable  HENT: Normocephalic, Atraumatic, Bilateral external ears normal, Oropharynx moist, No oral exudates, Nose normal.   Eyes: PERRL, EOMI, Conjunctiva normal, No discharge. No scleral icterus. Neck: Normal range of motion, No tenderness, Supple. Lymphatic: No lymphadenopathy noted. Cardiovascular: Normal heart rate, Normal rhythm, No murmurs, gallops or rubs. Thorax & Lungs: Normal breath sounds, No respiratory distress, No wheezing. Abdomen: Soft, tenderness to palpation of the left upper quadrant without rebound or guarding, tender to palpation of the lateral rib margin to patient's mid axillary line, no masses, No pulsatile masses, No distention, Normal bowel sounds  Skin: Warm, Dry, Pink, No mottling, No erythema, No rash. Back: No midline tenderness, left-sided CVA tenderness  Extremities: No edema, No tenderness, No cyanosis, Normal perfusion, No clubbing. Musculoskeletal: Good range of motion in all major joints as observed. No major deformities noted. Neurologic: Alert & oriented x 3,No focal deficits noted. Psychiatric: Affect normal, Judgment normal, Mood normal.   EKG  Per my interpretation demonstrates sinus rhythm with occasional PVCs at a rate of 69 bpm.  Normal axis. Normal intervals. No acute ST segment changes.   RADIOLOGY  Labs Reviewed   CBC WITH AUTO DIFFERENTIAL - Abnormal; Notable for the following components:       Result Value    Monocytes % 6.4 (*)     All other components within normal limits   URINALYSIS WITH MICROSCOPIC - Abnormal; Notable for the following components:    Clarity, UA SLIGHTLY HAZY (*) insurance. She was also provided with follow-up resource information for gastroenterology as she would likely benefit from EGD. Discharged home with strict return precautions. Appropriate PPE utilized as indicated for entire patient encounter? Time of Disposition: See timeline  ? New Prescriptions    No medications on file     FINAL IMPRESSION  1. Left upper quadrant abdominal pain        Electronically signed by:  [address], DO, 9/24/2021         [address], DO  09/24/21 6075

## 2021-09-24 NOTE — ED NOTES
Patient presents to the ED with complaint of left upper abdomin, that started 2 days ago. Patient has belching, and denies any urinary issues.       Linsey Licona RN  09/23/21 5000

## 2021-09-24 NOTE — TELEPHONE ENCOUNTER
It looks like the patient was seen in the ED yesterday for left upper quadrant abdominal pain not chest pain. Her labs were normal.  It looks like a TSH was done in February and was normal.  CT abdomen was normal.  ED note said exam was suggestive of peptic ulcer disease and recommended she start OTC pepcid and consider follow up to GI.

## 2021-11-02 ENCOUNTER — HOSPITAL ENCOUNTER (EMERGENCY)
Age: 56
Discharge: HOME | End: 2021-11-02
Attending: EMERGENCY MEDICINE

## 2021-11-02 ENCOUNTER — APPOINTMENT (OUTPATIENT)
Dept: GENERAL RADIOLOGY | Age: 56
End: 2021-11-02

## 2021-11-02 VITALS
RESPIRATION RATE: 18 BRPM | OXYGEN SATURATION: 94 % | HEIGHT: 66 IN | SYSTOLIC BLOOD PRESSURE: 128 MMHG | TEMPERATURE: 97.6 F | DIASTOLIC BLOOD PRESSURE: 99 MMHG | BODY MASS INDEX: 34.55 KG/M2 | WEIGHT: 215 LBS | HEART RATE: 78 BPM

## 2021-11-02 DIAGNOSIS — R07.89 CHEST WALL PAIN: Primary | ICD-10-CM

## 2021-11-02 PROCEDURE — 99283 EMERGENCY DEPT VISIT LOW MDM: CPT

## 2021-11-02 PROCEDURE — 71046 X-RAY EXAM CHEST 2 VIEWS: CPT

## 2021-11-02 PROCEDURE — 6370000000 HC RX 637 (ALT 250 FOR IP): Performed by: EMERGENCY MEDICINE

## 2021-11-02 RX ORDER — OXYCODONE HYDROCHLORIDE AND ACETAMINOPHEN 5; 325 MG/1; MG/1
1 TABLET ORAL ONCE
Status: COMPLETED | OUTPATIENT
Start: 2021-11-02 | End: 2021-11-02

## 2021-11-02 RX ORDER — LIDOCAINE 50 MG/G
1 PATCH TOPICAL DAILY
Qty: 30 PATCH | Refills: 0 | Status: SHIPPED | OUTPATIENT
Start: 2021-11-02 | End: 2022-08-03

## 2021-11-02 RX ADMIN — OXYCODONE AND ACETAMINOPHEN 1 TABLET: 5; 325 TABLET ORAL at 06:48

## 2021-11-02 ASSESSMENT — PAIN DESCRIPTION - PAIN TYPE: TYPE: ACUTE PAIN

## 2021-11-02 ASSESSMENT — PAIN SCALES - GENERAL
PAINLEVEL_OUTOF10: 10
PAINLEVEL_OUTOF10: 10

## 2021-11-02 ASSESSMENT — PAIN DESCRIPTION - DESCRIPTORS: DESCRIPTORS: SHARP

## 2021-11-02 ASSESSMENT — PAIN DESCRIPTION - LOCATION: LOCATION: RIB CAGE

## 2021-11-02 ASSESSMENT — PAIN DESCRIPTION - ORIENTATION: ORIENTATION: LEFT

## 2021-11-02 NOTE — ED PROVIDER NOTES
Triage Chief Complaint:   Rib Pain (Right rib pain. She reports injuring this area last Thursday.)    Lime:  Elsa Bella is a 64 y.o. female that presents to the ED with pain in the right ribs. Happened several days ago on Thursday when she reached for an object thought something pulled. Patient has numerous allergies to NSAIDs. She has been taking opiates an excessive amount every month last filled on August 12. She denies any shoulder pain she is not short of breath hurts to breathe and move. No direct injury or trauma. Past substance abuse history noted        HPI    Past Medical History:   Diagnosis Date    COPD (chronic obstructive pulmonary disease) ([facility] [address]     Denies COPD, uses inhaler for SOB - prn - hx: smoking    H/O echocardiogram 08/28/2018    EF 55-60%. No significant valvular disease.  Hernia, umbilical     History of cardiac monitoring 07/31/2018    Conclusion: 30 days event monitor suggesting sinus rhythm with symptomatic sinus tachycardia. So the symptoms are reported during normal rate and rhythm    Hyperlipidemia LDL goal <130 7/31/2018    Hypertension     Follows with PCP    Hypothyroidism     Prolonged emergence from general anesthesia     Tachycardia     Wears partial dentures      Past Surgical History:   Procedure Laterality Date    CARDIAC CATHETERIZATION  2009    [place]NL per pt - ([place])   [address] Bilateral 2003    CHOLECYSTECTOMY  2013    ECTOPIC PREGNANCY SURGERY  1991    ROTATOR CUFF REPAIR  12/31/2020    right side    SHOULDER ARTHROSCOPY Right 12/31/2020    RIGHT SHOULDER ARTHROSCOPY, SUBACROMIAL DECOMPRESSION, DISTAL CLAVICLE RESECTION DEBRIDEMENT ROTATOR CUFF REPAIR performed by Sherin Oneill DO at [facility] Right 12/31/2020    Diagnostic arthroscopy, right shoulder with rotator cuff repair.   2. subacromial decompression 3. distal clavicle resection 4. extensive debridement    TUBAL LIGATION  1991     Family History Problem Relation Age of Onset    Cancer Mother         Thyroid    Diabetes Mother     Hypertension Mother     Stroke Mother     Thyroid Disease Mother     Kidney Disease Mother     Glaucoma Mother     Thyroid Cancer Mother     Cancer Father     Heart Disease Father     Lung Cancer Father     Obesity Brother     Thyroid Disease Brother     Kidney Disease Brother      Social History     Socioeconomic History    Marital status: Single     Spouse name: Not on file    Number of children: Not on file    Years of education: Not on file    Highest education level: Not on file   Occupational History    Not on file   Tobacco Use    Smoking status: Current Every Day Smoker     Packs/day: 0.50     Years: 33.00     Pack years: 16.50     Types: Cigarettes     Start date: 1977    Smokeless tobacco: Never Used   Vaping Use    Vaping Use: Never used   Substance and Sexual Activity    Alcohol use: No    Drug use: Not Currently     Types: Methamphetamines (Crystal Meth), Marijuana (Weed)     Comment: Not used since 1997    Sexual activity: Yes     Partners: Male   Other Topics Concern    Not on file   Social History Narrative    Not on file     Social Determinants of Health     Financial Resource Strain:     Difficulty of Paying Living Expenses:    Food Insecurity:     Worried About Running Out of Food in the Last Year:     [address] in the Last Year:    Transportation Needs:     Lack of Transportation (Medical):      Lack of Transportation (Non-Medical):    Physical Activity:     Days of Exercise per Week:     Minutes of Exercise per Session:    Stress:     Feeling of Stress :    Social Connections:     Frequency of Communication with Friends and Family:     Frequency of Social Gatherings with Friends and Family:     Attends Christian Services:     Active Member of Clubs or Organizations:     Attends Club or Organization Meetings:     Marital Status:    Intimate Partner Violence:     Normocephalic and atraumatic. Right Ear: External ear normal.      Left Ear: External ear normal.   Eyes:      General: No scleral icterus. Right eye: No discharge. Left eye: No discharge. Conjunctiva/sclera: Conjunctivae normal.      Pupils: Pupils are equal, round, and reactive to light. Neck:      Thyroid: No thyromegaly. Vascular: No JVD. Trachea: No tracheal deviation. Cardiovascular:      Rate and Rhythm: Normal rate and regular rhythm. Heart sounds: Normal heart sounds. No murmur heard. No friction rub. No gallop. Pulmonary:      Effort: Pulmonary effort is normal. No respiratory distress. Breath sounds: Normal breath sounds. No stridor. No wheezing or rales. Chest:      Chest wall: Tenderness present. Abdominal:      General: Bowel sounds are normal. There is no distension. Palpations: Abdomen is soft. There is no mass. Tenderness: There is no abdominal tenderness. There is no guarding or rebound. Hernia: No hernia is present. Musculoskeletal:         General: No tenderness or deformity. Normal range of motion. Cervical back: Normal range of motion and neck supple. Lymphadenopathy:      Cervical: No cervical adenopathy. Skin:     General: Skin is warm and dry. Coloration: Skin is not pale. Findings: No erythema or rash. Neurological:      Mental Status: She is alert and oriented to person, place, and time. Cranial Nerves: No cranial nerve deficit. Sensory: No sensory deficit. Deep Tendon Reflexes: Reflexes are normal and symmetric. Reflexes normal.   Psychiatric:         Speech: Speech normal.         Behavior: Behavior normal.         Thought Content: Thought content normal.         Judgment: Judgment normal.         I have reviewed and interpreted all of the currently available lab results from this visit (ifapplicable):  No results found for this visit on 11/02/21.    Radiographs (if [address] [place] [address]  [phone]    Schedule an appointment as soon as possible for a visit   If symptoms worsen    Disposition medications (if applicable):  New Prescriptions    LIDOCAINE (LIDODERM) 5 %    Place 1 patch onto the skin daily 12 hours on, 12 hours off. Duane Morgan DO, FACEP      Comment: Please note this report has been produced using speech recognition software and maycontain errors related to that system including errors in grammar, punctuation, and spelling, as well as words and phrases that may be inappropriate. If there are any questions or concerns please feel free to contact thedictating provider for clarification.         William Vargas DO  11/02/21 2632

## 2021-11-02 NOTE — ED PROVIDER NOTES
Basic blood work, interventions, and/or imaging studies have been preordered in anticipation of need for the patient who has been placed in Emergency Department room. This patient will be evaluated by next physician and additional testing and or imaging may be required based on their examination  In brief 64year old female with history of chest wall pain from reported injury. She was leaning over a rail and she said \"it felt like something stabbed me. Like my breast pierced into me\" She took half a vicodin but she still has pain so she called EMS to go to ER. She has normal oxygen saturation and normal pulse. I ordered pain medication and chest xray based off presentation. Dr. Aggie Epley will be seeing this patient I only triaged orders.           [address],   11/02/21 8154

## 2021-11-02 NOTE — ED NOTES
The client is delivered to [facility] ED, room 1, by EMS for the complaint of Left Rib Pain. They have no IV access that was initiated by the EMS personnel. Vital Signs & Transportation interventions have been reported as being performed by the EMS personnel. Upon arrival the client is placed onto an ED bed. They are alert, their breathing is unlabored, chest expansion symmetrical, skin is warm and dry to touch. Their extremities are not relaxed and their facial expressions exhibit some facial grimacing. They do verbalize appropriate to questions or comments and are able to maintain eye contact or follow commands. She is restless and guarding or splinting the left rib area. She reports that she leaned over something Thursday and felt a 'pop' type pain. She that she was sore yesterday and took a half of a Norco to help her sleep but she woke up this morning in worse pain. Vital signs are obtained and the client is updated on the plan of care. The call light is placed within reach, they are blanketed, and the room lights are not dimmed.        Sidney Soto RN  11/02/21 [id number]

## 2021-11-02 NOTE — ED NOTES
Discharged with instructions and rx 1. Pt acknowledges understanding. Ambulatory at discharge.       Margaret Greco RN  11/02/21 1957

## 2022-06-21 DIAGNOSIS — E03.9 ACQUIRED HYPOTHYROIDISM: ICD-10-CM

## 2022-06-21 DIAGNOSIS — I10 ESSENTIAL HYPERTENSION: ICD-10-CM

## 2022-06-21 NOTE — LETTER
[address] [place] [address]  [facility] [address]  Phone: [phone]  Fax: [phone]    HUMA Armas CNP        June 22, 2022    Nathen Sorensen      Dear Anton Singleton: Your medication that you requested has been refilled at this time. Please note that you will need an appointment with your provider to continue getting your refills in a timely manner. If you were a former patient of [facility], please note you will need to establish with another provider in our office to continue to request medication refills      If you have any questions or concerns, please don't hesitate to call.     Sincerely,        HUMA Armas CNP

## 2022-06-22 RX ORDER — LEVOTHYROXINE SODIUM 100 UG/1
TABLET ORAL
Qty: 30 TABLET | Refills: 0 | Status: SHIPPED | OUTPATIENT
Start: 2022-06-22 | End: 2022-07-28

## 2022-06-22 RX ORDER — LISINOPRIL 10 MG/1
TABLET ORAL
Qty: 30 TABLET | Refills: 0 | Status: SHIPPED | OUTPATIENT
Start: 2022-06-22 | End: 2022-07-28

## 2022-07-28 DIAGNOSIS — E03.9 ACQUIRED HYPOTHYROIDISM: ICD-10-CM

## 2022-07-28 DIAGNOSIS — I10 ESSENTIAL HYPERTENSION: ICD-10-CM

## 2022-07-28 RX ORDER — LISINOPRIL 10 MG/1
TABLET ORAL
Qty: 7 TABLET | Refills: 0 | Status: SHIPPED | OUTPATIENT
Start: 2022-07-28 | End: 2022-08-03 | Stop reason: SDUPTHER

## 2022-07-28 RX ORDER — LEVOTHYROXINE SODIUM 0.1 MG/1
TABLET ORAL
Qty: 7 TABLET | Refills: 0 | Status: SHIPPED | OUTPATIENT
Start: 2022-07-28 | End: 2022-08-03 | Stop reason: SDUPTHER

## 2022-07-28 NOTE — ED TRIAGE NOTES
Arrived to room 5 for triage. Tolerated without difficulty. Bed in lowest position. Call light given.  Gowned for exam.
done

## 2022-08-03 ENCOUNTER — OFFICE VISIT (OUTPATIENT)
Dept: FAMILY MEDICINE CLINIC | Age: 57
End: 2022-08-03

## 2022-08-03 VITALS
HEART RATE: 80 BPM | SYSTOLIC BLOOD PRESSURE: 132 MMHG | TEMPERATURE: 97.7 F | WEIGHT: 210.6 LBS | BODY MASS INDEX: 33.99 KG/M2 | RESPIRATION RATE: 16 BRPM | OXYGEN SATURATION: 96 % | DIASTOLIC BLOOD PRESSURE: 82 MMHG

## 2022-08-03 DIAGNOSIS — Z87.891 FORMER SMOKER: ICD-10-CM

## 2022-08-03 DIAGNOSIS — E03.9 ACQUIRED HYPOTHYROIDISM: ICD-10-CM

## 2022-08-03 DIAGNOSIS — Z12.31 ENCOUNTER FOR SCREENING MAMMOGRAM FOR BREAST CANCER: ICD-10-CM

## 2022-08-03 DIAGNOSIS — Z51.81 MEDICATION MONITORING ENCOUNTER: ICD-10-CM

## 2022-08-03 DIAGNOSIS — I10 ESSENTIAL HYPERTENSION: Primary | ICD-10-CM

## 2022-08-03 DIAGNOSIS — E78.5 HYPERLIPIDEMIA LDL GOAL <130: ICD-10-CM

## 2022-08-03 DIAGNOSIS — R73.03 PREDIABETES: ICD-10-CM

## 2022-08-03 DIAGNOSIS — Z12.11 COLON CANCER SCREENING: ICD-10-CM

## 2022-08-03 DIAGNOSIS — Z87.891 PERSONAL HISTORY OF TOBACCO USE: ICD-10-CM

## 2022-08-03 PROCEDURE — G0296 VISIT TO DETERM LDCT ELIG: HCPCS | Performed by: NURSE PRACTITIONER

## 2022-08-03 PROCEDURE — 99214 OFFICE O/P EST MOD 30 MIN: CPT | Performed by: NURSE PRACTITIONER

## 2022-08-03 RX ORDER — LISINOPRIL 10 MG/1
TABLET ORAL
Qty: 90 TABLET | Refills: 1 | Status: SHIPPED | OUTPATIENT
Start: 2022-08-03

## 2022-08-03 RX ORDER — EZETIMIBE 10 MG/1
10 TABLET ORAL DAILY
Qty: 90 TABLET | Refills: 3 | Status: SHIPPED | OUTPATIENT
Start: 2022-08-03 | End: 2022-10-28

## 2022-08-03 RX ORDER — LEVOTHYROXINE SODIUM 0.1 MG/1
TABLET ORAL
Qty: 90 TABLET | Refills: 1 | Status: SHIPPED | OUTPATIENT
Start: 2022-08-03

## 2022-08-03 ASSESSMENT — PATIENT HEALTH QUESTIONNAIRE - PHQ9
2. FEELING DOWN, DEPRESSED OR HOPELESS: 0
SUM OF ALL RESPONSES TO PHQ QUESTIONS 1-9: 0
SUM OF ALL RESPONSES TO PHQ9 QUESTIONS 1 & 2: 0
SUM OF ALL RESPONSES TO PHQ QUESTIONS 1-9: 0
1. LITTLE INTEREST OR PLEASURE IN DOING THINGS: 0
SUM OF ALL RESPONSES TO PHQ QUESTIONS 1-9: 0
SUM OF ALL RESPONSES TO PHQ QUESTIONS 1-9: 0

## 2022-08-03 ASSESSMENT — ENCOUNTER SYMPTOMS
SHORTNESS OF BREATH: 0
WHEEZING: 0
DIARRHEA: 1
CHEST TIGHTNESS: 0
COUGH: 0
ABDOMINAL PAIN: 0
VOMITING: 0
NAUSEA: 0
CONSTIPATION: 0

## 2022-08-03 NOTE — ASSESSMENT & PLAN NOTE
No symptoms of hypo or hyperthyroidism: no decreased or increased weight, no feeling cold/chilly or excessively warm, no constipation, no undue sweatiness, anxiety or palpitations. She has had intermittent diarrhea.

## 2022-08-03 NOTE — PROGRESS NOTES
SUBJECTIVE:    Vicky Urena  1965  64 y.o.  female      Chief Complaint   Patient presents with    Follow-up     Needs to have blood work. Has been taking magnesium gummies . HPI    Allergies   Allergen Reactions    Keflex [Cephalexin]      Shut kidneys down    Meloxicam Shortness Of Breath    Penicillins Anaphylaxis    Statins Swelling     Throat swelling, vomiting    Cephalosporins Itching    Sulfa Antibiotics Itching    Tramadol Itching    Aspirin     Codeine Hives and Nausea And Vomiting    Naproxen Nausea And Vomiting     Dizzy lightheaded       Current Outpatient Medications   Medication Sig Dispense Refill    ezetimibe (ZETIA) 10 MG tablet Take 1 tablet by mouth in the morning. 90 tablet 3    levothyroxine (SYNTHROID) 100 MCG tablet Take 1 tablet by mouth once daily 90 tablet 1    lisinopril (PRINIVIL;ZESTRIL) 10 MG tablet Take 1 tablet by mouth once daily 90 tablet 1    MAGNESIUM PO Take by mouth      aspirin 81 MG chewable tablet Take 81 mg by mouth daily       No current facility-administered medications for this visit. Past Medical History:   Diagnosis Date    COPD (chronic obstructive pulmonary disease) ([facility] [address]     Denies COPD, uses inhaler for SOB - prn - hx: smoking    H/O echocardiogram 08/28/2018    EF 55-60%. No significant valvular disease. Hernia, umbilical     History of cardiac monitoring 07/31/2018    Conclusion: 30 days event monitor suggesting sinus rhythm with symptomatic sinus tachycardia.   So the symptoms are reported during normal rate and rhythm    Hyperlipidemia LDL goal <130 7/31/2018    Hypertension     Follows with PCP    Hypothyroidism     Prolonged emergence from general anesthesia     Tachycardia     Wears partial dentures      Past Surgical History:   Procedure Laterality Date    CARDIAC CATHETERIZATION  2009    [place] per [place] - ([place]    [address] Bilateral 2003    CHOLECYSTECTOMY  2013    ECTOPIC PREGNANCY SURGERY  1991    ROTATOR CUFF REPAIR  2020    right side    SHOULDER ARTHROSCOPY Right 2020    RIGHT SHOULDER ARTHROSCOPY, SUBACROMIAL DECOMPRESSION, DISTAL CLAVICLE RESECTION DEBRIDEMENT ROTATOR CUFF REPAIR performed by Mena Hinkle DO at [address] [facility] Right 2020    Diagnostic arthroscopy, right shoulder with rotator cuff repair. 2. subacromial decompression 3. distal clavicle resection 4. extensive debridement    TUBAL LIGATION       Social History     Socioeconomic History    Marital status: Single     Spouse name: None    Number of children: None    Years of education: None    Highest education level: None   Tobacco Use    Smoking status: Former     Packs/day: 0.50     Years: 33.00     Pack years: 16.50     Types: Cigarettes     Start date: 0     Quit date: 2022     Years since quittin.0    Smokeless tobacco: Never   Vaping Use    Vaping Use: Never used   Substance and Sexual Activity    Alcohol use: No    Drug use: Not Currently     Types: Methamphetamines (Crystal Meth), Marijuana (Ferna Amen)     Comment: Not used since     Sexual activity: Yes     Partners: Male         Hypothyroidism  No symptoms of hypo or hyperthyroidism: no decreased or increased weight, no feeling cold/chilly or excessively warm, no constipation, no undue sweatiness, anxiety or palpitations. She has had intermittent diarrhea. Essential hypertension  Stable. Patient denies any exertional chest pain, dyspnea, palpitations, syncope, orthopnea, edema or paroxysmal nocturnal dyspnea. Former smoker  This issue is smoking 2 packs a day for over 20 years. States that she quit smoking 1 week ago. States that she is able to taste and smell now. Review of Systems   Constitutional:  Negative for appetite change, chills, fatigue and unexpected weight change. Respiratory:  Negative for cough, chest tightness, shortness of breath and wheezing.     Cardiovascular:  Negative for chest pain, palpitations and leg swelling. Gastrointestinal:  Positive for diarrhea. Negative for abdominal pain, constipation, nausea and vomiting. Musculoskeletal:  Negative for arthralgias. Neurological:  Negative for weakness, numbness and headaches. Hematological:  Negative for adenopathy. OBJECTIVE:    /82 (Site: Left Upper Arm, Position: Sitting, Cuff Size: Medium Adult)   Pulse 80   Temp 97.7 °F (36.5 °C) (Infrared)   Resp 16   Wt 210 lb 9.6 oz (95.5 kg)   SpO2 96%   BMI 33.99 kg/m²     Physical Exam  Constitutional:       Appearance: Normal appearance. She is well-developed. HENT:      Head: Normocephalic and atraumatic. Right Ear: Hearing normal.      Left Ear: Hearing normal.   Neck:      Thyroid: No thyromegaly. Vascular: No carotid bruit or JVD. Cardiovascular:      Rate and Rhythm: Normal rate and regular rhythm. Heart sounds: Normal heart sounds. No murmur heard. No friction rub. No gallop. Pulmonary:      Effort: Pulmonary effort is normal. No respiratory distress. Breath sounds: Normal breath sounds. No wheezing, rhonchi or rales. Abdominal:      Palpations: Abdomen is soft. Musculoskeletal:         General: Normal range of motion. Cervical back: Normal range of motion and neck supple. Skin:     General: Skin is warm and dry. Neurological:      General: No focal deficit present. Mental Status: She is alert and oriented to person, place, and time. Psychiatric:         Mood and Affect: Mood normal.         Behavior: Behavior normal. Behavior is cooperative. Thought Content: Thought content normal.       ASSESSMENT/PLAN:    1. Encounter for screening mammogram for breast cancer  - JING Digital Screen Bilateral; Future    2. Hyperlipidemia LDL goal <130  Refilled. - ezetimibe (ZETIA) 10 MG tablet; Take 1 tablet by mouth in the morning. Dispense: 90 tablet; Refill: 3  - Lipid Panel; Future    3. Acquired hypothyroidism  Labs as ordered.  Synthroid refilled at 100 mcg--will adjust if needed based on lab results  - levothyroxine (SYNTHROID) 100 MCG tablet; Take 1 tablet by mouth once daily  Dispense: 90 tablet; Refill: 1  - TSH with Reflex; Future    4. Essential hypertension  DASH diet, weight loss. Continue lisinopril  - lisinopril (PRINIVIL;ZESTRIL) 10 MG tablet; Take 1 tablet by mouth once daily  Dispense: 90 tablet; Refill: 1  - CBC with Auto Differential; Future  - Comprehensive Metabolic Panel; Future    5. Prediabetes  The patient is asked to make an attempt to improve diet and exercise patterns to aid in medical management of this problem. - Hemoglobin A1C; Future    6. Medication monitoring encounter  - Magnesium; Future    7. Colon cancer screening  - Cologuaselwyn    8. Former smoker    5. Personal history of tobacco use  - ID VISIT TO DISCUSS LUNG CA SCREEN W LDCT  - CT Lung Screen (Annual); Future               Return in about 6 months (around 2/3/2023), or if symptoms worsen or fail to improve. (Please note that portions of this note may have been completed with a voice recognition program. Efforts were made to edit the dictations but occasionally words aremis-transcribed.)    Low Dose CT (LDCT) Lung Screening criteria met:     Age 50-77(Medicare) or 50-80 (USPSTF)   Pack year smoking >20   Still smoking or less than 15 year since quit   No sign or symptoms of lung cancer   > 11 months since last LDCT     Risks and benefits of lung cancer screening with LDCT scans discussed:    Significance of positive screen - False-positive LDCT results often occur. 95% of all positive results do not lead to a diagnosis of cancer. Usually further imaging can resolve most false-positive results; however, some patients may require invasive procedures. Over diagnosis risk - 10% to 12% of screen-detected lung cancer cases are over diagnosed--that is, the cancer would not have been detected in the patient's lifetime without the screening.     Need for follow up

## 2022-08-03 NOTE — ASSESSMENT & PLAN NOTE
This issue is smoking 2 packs a day for over 20 years. States that she quit smoking 1 week ago. States that she is able to taste and smell now.

## 2022-08-04 LAB
ALBUMIN SERPL-MCNC: 4.2 G/DL
ALP BLD-CCNC: 78 U/L
ALT SERPL-CCNC: 26 U/L
ANION GAP SERPL CALCULATED.3IONS-SCNC: 1.2 MMOL/L
AST SERPL-CCNC: 24 U/L
AVERAGE GLUCOSE: NORMAL
BASOPHILS ABSOLUTE: ABNORMAL
BASOPHILS RELATIVE PERCENT: ABNORMAL
BILIRUB SERPL-MCNC: 0.5 MG/DL (ref 0.1–1.4)
BUN BLDV-MCNC: 11 MG/DL
CALCIUM SERPL-MCNC: 9.4 MG/DL
CHLORIDE BLD-SCNC: 103 MMOL/L
CHOLESTEROL, TOTAL: 167 MG/DL
CHOLESTEROL/HDL RATIO: ABNORMAL
CO2: 27 MMOL/L
CREAT SERPL-MCNC: 0.94 MG/DL
EOSINOPHILS ABSOLUTE: 288 /ΜL
EOSINOPHILS RELATIVE PERCENT: 3 %
GFR CALCULATED: >=60
GLUCOSE BLD-MCNC: 100 MG/DL
HBA1C MFR BLD: 5.9 %
HCT VFR BLD CALC: 47.2 % (ref 36–46)
HDLC SERPL-MCNC: 36 MG/DL (ref 35–70)
HEMOGLOBIN: 15.8 G/DL (ref 12–16)
LDL CHOLESTEROL CALCULATED: 103 MG/DL (ref 0–160)
LYMPHOCYTES ABSOLUTE: 3936 /ΜL
LYMPHOCYTES RELATIVE PERCENT: 41 %
MCH RBC QN AUTO: 29.8 PG
MCHC RBC AUTO-ENTMCNC: 33.5 G/DL
MCV RBC AUTO: 89 FL
MONOCYTES ABSOLUTE: 768 /ΜL
MONOCYTES RELATIVE PERCENT: 8 %
NEUTROPHILS ABSOLUTE: 4608 /ΜL
NEUTROPHILS RELATIVE PERCENT: 48 %
NONHDLC SERPL-MCNC: ABNORMAL MG/DL
PDW BLD-RTO: 13.6 %
PLATELET # BLD: 242 K/ΜL
PMV BLD AUTO: ABNORMAL FL
POTASSIUM SERPL-SCNC: 4.5 MMOL/L
RBC # BLD: 5.31 10^6/ΜL
SODIUM BLD-SCNC: 139 MMOL/L
TOTAL PROTEIN: 7.7
TRIGL SERPL-MCNC: 140 MG/DL
TSH SERPL DL<=0.05 MIU/L-ACNC: 1.39 UIU/ML
VLDLC SERPL CALC-MCNC: 28 MG/DL
WBC # BLD: 9.6 10^3/ML

## 2022-08-08 ENCOUNTER — TELEPHONE (OUTPATIENT)
Dept: FAMILY MEDICINE CLINIC | Age: 57
End: 2022-08-08

## 2022-08-08 DIAGNOSIS — Z51.81 MEDICATION MONITORING ENCOUNTER: ICD-10-CM

## 2022-08-08 DIAGNOSIS — E78.5 HYPERLIPIDEMIA LDL GOAL <130: ICD-10-CM

## 2022-08-08 DIAGNOSIS — R73.03 PREDIABETES: ICD-10-CM

## 2022-08-08 DIAGNOSIS — I10 ESSENTIAL HYPERTENSION: ICD-10-CM

## 2022-08-08 DIAGNOSIS — E03.9 ACQUIRED HYPOTHYROIDISM: ICD-10-CM

## 2022-08-08 DIAGNOSIS — Z87.891 PERSONAL HISTORY OF TOBACCO USE: ICD-10-CM

## 2022-08-16 ENCOUNTER — TELEPHONE (OUTPATIENT)
Dept: FAMILY MEDICINE CLINIC | Age: 57
End: 2022-08-16

## 2022-08-16 NOTE — TELEPHONE ENCOUNTER
Patient called and concerned about her lab results from last that no one has called her. Explained that her provider was out of office last week . Patient had labs at [facility] and brought results in . Nurse abstracted lab results into system. Please review and advise.

## 2022-09-02 ENCOUNTER — COMMUNITY OUTREACH (OUTPATIENT)
Dept: FAMILY MEDICINE CLINIC | Age: 57
End: 2022-09-02

## 2022-09-02 NOTE — PROGRESS NOTES
Patient's HM shows they are overdue for [place] [id number] and  files searched. No results to attach to order nor HM updated.

## 2022-09-07 DIAGNOSIS — L08.9 SKIN INFECTION: ICD-10-CM

## 2022-09-07 RX ORDER — DOXYCYCLINE HYCLATE 100 MG
100 TABLET ORAL 2 TIMES DAILY
Qty: 14 TABLET | Refills: 0 | Status: SHIPPED | OUTPATIENT
Start: 2022-09-07 | End: 2022-09-14

## 2022-09-07 NOTE — TELEPHONE ENCOUNTER
Patient states she did see Rocio Madrigal 3 weeks ago she does not have insurance she can not afford to come back in to see Rocio Madrigal again.  She states she has a boil again on her left breast.

## 2022-10-27 ENCOUNTER — OFFICE VISIT (OUTPATIENT)
Dept: INTERNAL MEDICINE CLINIC | Age: 57
End: 2022-10-27

## 2022-10-27 VITALS
HEART RATE: 100 BPM | HEIGHT: 66 IN | BODY MASS INDEX: 33.75 KG/M2 | TEMPERATURE: 97.8 F | WEIGHT: 210 LBS | OXYGEN SATURATION: 98 %

## 2022-10-27 DIAGNOSIS — R06.2 WHEEZE: ICD-10-CM

## 2022-10-27 DIAGNOSIS — R05.1 ACUTE COUGH: Primary | ICD-10-CM

## 2022-10-27 DIAGNOSIS — Z87.891 FORMER SMOKER: ICD-10-CM

## 2022-10-27 PROCEDURE — 99213 OFFICE O/P EST LOW 20 MIN: CPT | Performed by: PHYSICIAN ASSISTANT

## 2022-10-27 RX ORDER — GUAIFENESIN 600 MG/1
600 TABLET, EXTENDED RELEASE ORAL 2 TIMES DAILY
Qty: 30 TABLET | Refills: 0 | Status: SHIPPED | OUTPATIENT
Start: 2022-10-27 | End: 2022-11-11

## 2022-10-27 RX ORDER — DEXTROMETHORPHAN HYDROBROMIDE AND PROMETHAZINE HYDROCHLORIDE 15; 6.25 MG/5ML; MG/5ML
5 SYRUP ORAL 4 TIMES DAILY PRN
Qty: 240 ML | Refills: 0 | Status: SHIPPED | OUTPATIENT
Start: 2022-10-27 | End: 2022-11-03

## 2022-10-27 RX ORDER — ALBUTEROL SULFATE 90 UG/1
2 AEROSOL, METERED RESPIRATORY (INHALATION) 4 TIMES DAILY PRN
Qty: 54 G | Refills: 1 | Status: SHIPPED | OUTPATIENT
Start: 2022-10-27

## 2022-10-27 RX ORDER — AZITHROMYCIN 250 MG/1
250 TABLET, FILM COATED ORAL SEE ADMIN INSTRUCTIONS
Qty: 6 TABLET | Refills: 0 | Status: SHIPPED | OUTPATIENT
Start: 2022-10-27 | End: 2022-11-01

## 2022-10-27 NOTE — PROGRESS NOTES
10/27/22  Mindy Hurtado  1965    FLU/COVID-19 CLINIC EVALUATION  This is my first patient encounter with Myrtle Zaragoza; chart reviewed. HPI SYMPTOMS:  Myrtle Zaragoza is a pleasant 62 y.o. female presenting to clinic today for cough/congestion/wheezing. Patient reports onset of symptoms approximately 6 to 7 days ago; patient reports initial sneezing, sore throat, postnasal drip which has progressed to headache, cough productive of purulent sputum, mild shortness of breath, wheezing; patient reports symptoms are worse at night. Patient is longtime smoker of approximately 25 years, reports she quit approximately 2 months ago. Patient does have a history of pneumonia which she reports occurred in 2016, 2017, 2019. Patient denies recent fevers, chest pains, lightheadedness or syncope. Patient reports performing 3 at home COVID tests over the past several days which have all been negative. Employer:    [] Fevers  [] Chills  [x] Cough  [] Coughing up blood  [x] Chest Congestion  [x] Nasal Congestion  [x] Feeling short of breath  [] Sometimes  [] Frequently  [] All the time  [x] Chest pain  [x] Headaches  []Tolerable  [] Severe  [x] Sore throat  [] Muscle aches  [] Nausea  [] Vomiting  []Unable to keep fluids down  [] Diarrhea  []Severe    [] OTHER SYMPTOMS:      Symptom Duration:   [] 1  [] 2   [] 3   [] 4    [] 5   [x] 6   [] 7   [] 8   [] 9   [] 10   [] 11   [] 12   [] 13   [] 14   [] Longer than 14 days    Symptom course:   [] Worsening     [x] Stable     [] Improving    Pulse 100   Temp 97.8 °F (36.6 °C)   Ht 5' 6\" (1.676 m)   Wt 210 lb (95.3 kg)   SpO2 98%   BMI 33.89 kg/m²     Physical Exam  Constitutional:       Appearance: She is obese. HENT:      Head: Normocephalic and atraumatic. Right Ear: External ear normal. There is impacted cerumen.       Left Ear: External ear normal.      Ears:      Comments: Bulging with small serous effusion, no erythema     Nose: Congestion present. Mouth/Throat:      Pharynx: Posterior oropharyngeal erythema present. Eyes:      Extraocular Movements: Extraocular movements intact. Pupils: Pupils are equal, round, and reactive to light. Neck:      Vascular: No carotid bruit. Cardiovascular:      Rate and Rhythm: Regular rhythm. Pulses: Normal pulses. Pulmonary:      Effort: Pulmonary effort is normal. No respiratory distress. Breath sounds: No stridor. Wheezing and rales present. No rhonchi. Comments: Wheezing and rales bilaterally in lower lung fields  Chest:      Chest wall: No tenderness. Musculoskeletal:         General: Normal range of motion. Cervical back: Normal range of motion. No rigidity. Lymphadenopathy:      Cervical: Cervical adenopathy present. Skin:     General: Skin is warm and dry. Neurological:      General: No focal deficit present. Mental Status: She is alert and oriented to person, place, and time. Mental status is at baseline. Psychiatric:         Behavior: Behavior normal.       1. Acute cough  Patient likely has some baseline COPD, wheezing and rales noted in lung fields; patient reports ongoing cough productive of purulent sputum; multiple at home negative COVID test.  Out of timeframe to initiate influenza treatment. Recommend chest x-ray to rule out pneumonia however patient declines this as she states that it is cost prohibitive. Recommend steroid however patient declines this due to prior sensitivity to steroids. Will treat with azithromycin, albuterol and ipratropium, Mucinex, nightly cough suppressant for use as needed. Reviewed proper use, monitoring, side effects of medication sent in. Return to clinic if symptoms change, persist.  Report to emergency department for any worsening.  - albuterol sulfate HFA (VENTOLIN HFA) 108 (90 Base) MCG/ACT inhaler;  Inhale 2 puffs into the lungs 4 times daily as needed for Wheezing  Dispense: 54 g; Refill: 1  - promethazine-dextromethorphan (PROMETHAZINE-DM) 6.25-15 MG/5ML syrup; Take 5 mLs by mouth 4 times daily as needed for Cough  Dispense: 240 mL; Refill: 0  - azithromycin (ZITHROMAX) 250 MG tablet; Take 1 tablet by mouth See Admin Instructions for 5 days 500mg on day 1 followed by 250mg on days 2 - 5  Dispense: 6 tablet; Refill: 0  - guaiFENesin (MUCINEX) 600 MG extended release tablet; Take 1 tablet by mouth 2 times daily for 15 days  Dispense: 30 tablet; Refill: 0  - ipratropium (ATROVENT HFA) 17 MCG/ACT inhaler; Inhale 1 puff into the lungs 3 times daily  Dispense: 1 each; Refill: 0    2. Wheeze    - albuterol sulfate HFA (VENTOLIN HFA) 108 (90 Base) MCG/ACT inhaler; Inhale 2 puffs into the lungs 4 times daily as needed for Wheezing  Dispense: 54 g; Refill: 1  - azithromycin (ZITHROMAX) 250 MG tablet; Take 1 tablet by mouth See Admin Instructions for 5 days 500mg on day 1 followed by 250mg on days 2 - 5  Dispense: 6 tablet; Refill: 0  - guaiFENesin (MUCINEX) 600 MG extended release tablet; Take 1 tablet by mouth 2 times daily for 15 days  Dispense: 30 tablet; Refill: 0  - ipratropium (ATROVENT HFA) 17 MCG/ACT inhaler; Inhale 1 puff into the lungs 3 times daily  Dispense: 1 each; Refill: 0    3. Former smoker          An  electronic signature was used to authenticate this note.      --JASON Lynn on 10/27/2022 at 12:30 PM

## 2022-10-28 DIAGNOSIS — E78.5 HYPERLIPIDEMIA LDL GOAL <130: ICD-10-CM

## 2022-10-28 RX ORDER — EZETIMIBE 10 MG/1
TABLET ORAL
Qty: 90 TABLET | Refills: 1 | Status: SHIPPED | OUTPATIENT
Start: 2022-10-28 | End: 2022-12-20 | Stop reason: ALTCHOICE

## 2022-12-15 ENCOUNTER — OFFICE VISIT (OUTPATIENT)
Dept: INTERNAL MEDICINE CLINIC | Age: 57
End: 2022-12-15

## 2022-12-15 ENCOUNTER — HOSPITAL ENCOUNTER (EMERGENCY)
Age: 57
Discharge: HOME | End: 2022-12-15
Attending: EMERGENCY MEDICINE

## 2022-12-15 ENCOUNTER — APPOINTMENT (OUTPATIENT)
Dept: GENERAL RADIOLOGY | Age: 57
End: 2022-12-15

## 2022-12-15 VITALS
OXYGEN SATURATION: 96 % | TEMPERATURE: 98.3 F | BODY MASS INDEX: 32.43 KG/M2 | DIASTOLIC BLOOD PRESSURE: 106 MMHG | HEART RATE: 84 BPM | HEIGHT: 66 IN | SYSTOLIC BLOOD PRESSURE: 137 MMHG | RESPIRATION RATE: 20 BRPM | WEIGHT: 201.8 LBS

## 2022-12-15 VITALS
SYSTOLIC BLOOD PRESSURE: 120 MMHG | HEART RATE: 86 BPM | WEIGHT: 210 LBS | OXYGEN SATURATION: 97 % | DIASTOLIC BLOOD PRESSURE: 76 MMHG | HEIGHT: 66 IN | BODY MASS INDEX: 33.75 KG/M2

## 2022-12-15 DIAGNOSIS — R00.2 PALPITATIONS: Primary | ICD-10-CM

## 2022-12-15 DIAGNOSIS — I49.3 PVC (PREMATURE VENTRICULAR CONTRACTION): ICD-10-CM

## 2022-12-15 DIAGNOSIS — R07.9 CHEST PAIN, UNSPECIFIED TYPE: ICD-10-CM

## 2022-12-15 LAB
ALBUMIN SERPL-MCNC: 4.1 GM/DL (ref 3.4–5)
ALP BLD-CCNC: 73 IU/L (ref 40–129)
ALT SERPL-CCNC: 17 U/L (ref 10–40)
ANION GAP SERPL CALCULATED.3IONS-SCNC: 11 MMOL/L (ref 4–16)
AST SERPL-CCNC: 19 IU/L (ref 15–37)
BACTERIA: NEGATIVE /HPF
BASOPHILS ABSOLUTE: 0.1 K/CU MM
BASOPHILS RELATIVE PERCENT: 0.8 % (ref 0–1)
BILIRUB SERPL-MCNC: 0.3 MG/DL (ref 0–1)
BILIRUBIN URINE: NEGATIVE MG/DL
BLOOD, URINE: ABNORMAL
BUN BLDV-MCNC: 12 MG/DL (ref 6–23)
CALCIUM SERPL-MCNC: 9.5 MG/DL (ref 8.3–10.6)
CAST TYPE: NORMAL /HPF
CHLORIDE BLD-SCNC: 104 MMOL/L (ref 99–110)
CLARITY: CLEAR
CO2: 24 MMOL/L (ref 21–32)
COLOR: YELLOW
CREAT SERPL-MCNC: 0.9 MG/DL (ref 0.6–1.1)
CRYSTAL TYPE: NEGATIVE /HPF
DIFFERENTIAL TYPE: ABNORMAL
EKG ATRIAL RATE: 81 BPM
EKG DIAGNOSIS: NORMAL
EKG P AXIS: 49 DEGREES
EKG P-R INTERVAL: 160 MS
EKG Q-T INTERVAL: 374 MS
EKG QRS DURATION: 76 MS
EKG QTC CALCULATION (BAZETT): 434 MS
EKG R AXIS: 40 DEGREES
EKG T AXIS: 62 DEGREES
EKG VENTRICULAR RATE: 81 BPM
EOSINOPHILS ABSOLUTE: 0.4 K/CU MM
EOSINOPHILS RELATIVE PERCENT: 4.7 % (ref 0–3)
EPITHELIAL CELLS, UA: 2 /HPF
GFR SERPL CREATININE-BSD FRML MDRD: >60 ML/MIN/1.73M2
GLUCOSE BLD-MCNC: 96 MG/DL (ref 70–99)
GLUCOSE, URINE: NEGATIVE MG/DL
HCT VFR BLD CALC: 44.2 % (ref 37–47)
HEMOGLOBIN: 15.2 GM/DL (ref 12.5–16)
IMMATURE NEUTROPHIL %: 0.1 % (ref 0–0.43)
KETONES, URINE: NEGATIVE MG/DL
LEUKOCYTE ESTERASE, URINE: ABNORMAL
LYMPHOCYTES ABSOLUTE: 3.3 K/CU MM
LYMPHOCYTES RELATIVE PERCENT: 36.6 % (ref 24–44)
MAGNESIUM: 1.8 MG/DL (ref 1.8–2.4)
MCH RBC QN AUTO: 29.7 PG (ref 27–31)
MCHC RBC AUTO-ENTMCNC: 34.4 % (ref 32–36)
MCV RBC AUTO: 86.5 FL (ref 78–100)
MONOCYTES ABSOLUTE: 0.6 K/CU MM
MONOCYTES RELATIVE PERCENT: 6.7 % (ref 0–4)
NITRITE URINE, QUANTITATIVE: NEGATIVE
PDW BLD-RTO: 12.3 % (ref 11.7–14.9)
PH, URINE: 6 (ref 5–8)
PLATELET # BLD: 234 K/CU MM (ref 140–440)
PMV BLD AUTO: 10.5 FL (ref 7.5–11.1)
POTASSIUM SERPL-SCNC: 4.3 MMOL/L (ref 3.5–5.1)
PROTEIN UA: NEGATIVE MG/DL
RBC # BLD: 5.11 M/CU MM (ref 4.2–5.4)
RBC URINE: 1 /HPF (ref 0–6)
SEGMENTED NEUTROPHILS ABSOLUTE COUNT: 4.6 K/CU MM
SEGMENTED NEUTROPHILS RELATIVE PERCENT: 51.1 % (ref 36–66)
SODIUM BLD-SCNC: 139 MMOL/L (ref 135–145)
SPECIFIC GRAVITY UA: 1.01 (ref 1–1.03)
TOTAL IMMATURE NEUTOROPHIL: 0.01 K/CU MM
TOTAL PROTEIN: 7.3 GM/DL (ref 6.4–8.2)
TROPONIN T: <0.01 NG/ML
TROPONIN T: <0.01 NG/ML
UROBILINOGEN, URINE: 0.2 MG/DL (ref 0.2–1)
WBC # BLD: 9 K/CU MM (ref 4–10.5)
WBC UA: 3 /HPF (ref 0–5)

## 2022-12-15 PROCEDURE — 84484 ASSAY OF TROPONIN QUANT: CPT

## 2022-12-15 PROCEDURE — 3078F DIAST BP <80 MM HG: CPT | Performed by: PHYSICIAN ASSISTANT

## 2022-12-15 PROCEDURE — 80053 COMPREHEN METABOLIC PANEL: CPT

## 2022-12-15 PROCEDURE — 99285 EMERGENCY DEPT VISIT HI MDM: CPT

## 2022-12-15 PROCEDURE — 83735 ASSAY OF MAGNESIUM: CPT

## 2022-12-15 PROCEDURE — 93000 ELECTROCARDIOGRAM COMPLETE: CPT | Performed by: PHYSICIAN ASSISTANT

## 2022-12-15 PROCEDURE — 3074F SYST BP LT 130 MM HG: CPT | Performed by: PHYSICIAN ASSISTANT

## 2022-12-15 PROCEDURE — 93005 ELECTROCARDIOGRAM TRACING: CPT | Performed by: EMERGENCY MEDICINE

## 2022-12-15 PROCEDURE — 93010 ELECTROCARDIOGRAM REPORT: CPT | Performed by: INTERNAL MEDICINE

## 2022-12-15 PROCEDURE — 85025 COMPLETE CBC W/AUTO DIFF WBC: CPT

## 2022-12-15 PROCEDURE — 99213 OFFICE O/P EST LOW 20 MIN: CPT | Performed by: PHYSICIAN ASSISTANT

## 2022-12-15 PROCEDURE — 71046 X-RAY EXAM CHEST 2 VIEWS: CPT

## 2022-12-15 PROCEDURE — 81001 URINALYSIS AUTO W/SCOPE: CPT

## 2022-12-15 ASSESSMENT — ENCOUNTER SYMPTOMS
EYE PAIN: 0
DIARRHEA: 0
PHOTOPHOBIA: 0
VOMITING: 0
SHORTNESS OF BREATH: 0
COUGH: 0
CONSTIPATION: 0
EYE DISCHARGE: 0
WHEEZING: 0
ABDOMINAL PAIN: 0
EYE REDNESS: 0
BACK PAIN: 0
NAUSEA: 0
SORE THROAT: 0
BLOOD IN STOOL: 0
CHEST TIGHTNESS: 0
RHINORRHEA: 0
COLOR CHANGE: 0

## 2022-12-15 ASSESSMENT — PAIN - FUNCTIONAL ASSESSMENT: PAIN_FUNCTIONAL_ASSESSMENT: NONE - DENIES PAIN

## 2022-12-15 NOTE — DISCHARGE INSTRUCTIONS
Please follow-up with primary care physician or referral physician next 24 to 48 hours. Call to schedule appointment. Return to the Emergency Department for chest pain, shortness of breath, weakness, syncope, or any other symptoms.

## 2022-12-15 NOTE — Clinical Note
Cecilia Nolen was seen and treated in our emergency department on 12/15/2022. She may return to work on 12/19/2022. If you have any questions or concerns, please don't hesitate to call.       Bernardino Buenrostro MD

## 2022-12-15 NOTE — PROGRESS NOTES
Newton Agarwal (:  1965) is a 62 y.o. female,Established patient, here for evaluation of the following chief complaint(s):    Palpitations and Dizziness      SUBJECTIVE/OBJECTIVE:  CHANTE Agarwal is a pleasant 62 y.o. female presenting to clinic today for palpitations. Patient reports ongoing palpitations for the past several weeks; patient reports that this has worsened over the past 5 days and is now constant; patient reports sensation of \"butterflies on her chest;\" patient reports ongoing dizziness which was worse yesterday and today; patient reports he has also had some left-sided chest pain which radiates into her left upper extremity which comes and goes. Patient denies syncope, shortness of breath. Allergies   Allergen Reactions    Keflex [Cephalexin]      Shut kidneys down    Meloxicam Shortness Of Breath    Penicillins Anaphylaxis    Statins Swelling     Throat swelling, vomiting    Cephalosporins Itching    Sulfa Antibiotics Itching    Tramadol Itching    Aspirin     Codeine Hives and Nausea And Vomiting    Naproxen Nausea And Vomiting     Dizzy lightheaded       Current Outpatient Medications   Medication Sig Dispense Refill    ezetimibe (ZETIA) 10 MG tablet Take 1 tablet by mouth once daily 90 tablet 1    albuterol sulfate HFA (VENTOLIN HFA) 108 (90 Base) MCG/ACT inhaler Inhale 2 puffs into the lungs 4 times daily as needed for Wheezing 54 g 1    ipratropium (ATROVENT HFA) 17 MCG/ACT inhaler Inhale 1 puff into the lungs 3 times daily 1 each 0    levothyroxine (SYNTHROID) 100 MCG tablet Take 1 tablet by mouth once daily 90 tablet 1    lisinopril (PRINIVIL;ZESTRIL) 10 MG tablet Take 1 tablet by mouth once daily 90 tablet 1    MAGNESIUM PO Take by mouth      aspirin 81 MG chewable tablet Take 81 mg by mouth daily       No current facility-administered medications for this visit.        /76   Pulse 86   Ht 5' 6\" (1.676 m)   Wt 210 lb (95.3 kg)   SpO2 97%   BMI 33.89 kg/m² Review of Systems   Constitutional:  Negative for appetite change, chills, fatigue and fever. HENT:  Negative for congestion, ear pain, hearing loss, rhinorrhea and sore throat. Eyes:  Negative for photophobia, pain, discharge and redness. Respiratory:  Negative for cough, chest tightness, shortness of breath and wheezing. Cardiovascular:  Positive for chest pain and palpitations. Negative for leg swelling. Gastrointestinal:  Negative for abdominal pain, blood in stool, constipation, diarrhea, nausea and vomiting. Endocrine: Negative for polyuria. Genitourinary:  Negative for difficulty urinating, dysuria, flank pain, frequency, hematuria and urgency. Musculoskeletal:  Negative for arthralgias, back pain, gait problem and joint swelling. Skin:  Negative for color change and rash. Neurological:  Positive for dizziness and light-headedness. Negative for syncope, weakness and headaches. Hematological:  Negative for adenopathy. Psychiatric/Behavioral:  Negative for agitation, behavioral problems and suicidal ideas. The patient is not nervous/anxious. Physical Exam  Constitutional:       General: She is not in acute distress. Appearance: She is obese. She is not ill-appearing, toxic-appearing or diaphoretic. HENT:      Head: Normocephalic and atraumatic. Right Ear: External ear normal.      Left Ear: External ear normal.   Cardiovascular:      Rate and Rhythm: Normal rate. Rhythm irregular. Pulses: Normal pulses. Pulmonary:      Effort: Pulmonary effort is normal. No respiratory distress. Breath sounds: Normal breath sounds. Musculoskeletal:         General: Normal range of motion. Skin:     General: Skin is warm and dry. Neurological:      General: No focal deficit present. Mental Status: She is alert and oriented to person, place, and time. Mental status is at baseline. Psychiatric:         Behavior: Behavior normal.       ASSESSMENT/PLAN:  1. Palpitations   -Physical exam reveals a regular rhythm, premature beats every fourth or fifth beat; EKG in office shows a somewhat frequent PVCs off and on; with patient being symptomatic including worsening palpitations and intermittent chest pains, decided it would be safest if patient went to the emergency department for further monitoring and work-up. Offered patient transportation however she declined; handoff report provided to ER provider. 2. Chest pain, unspecified type  -     EKG 12 Lead      Return in about 1 week (around 12/22/2022), or if symptoms worsen or fail to improve, for Follow Up. An electronic signature was used to authenticate this note.     --JASON Perry

## 2022-12-15 NOTE — ED PROVIDER NOTES
Emergency [facility]    Patient: Mauro Prajapati  MRN: [id number]  : 1965  Date of Evaluation: 12/15/2022  ED Provider: Law Kessler MD    Chief Complaint       Chief Complaint   Patient presents with    Palpitations     Pt arrives ambulatory from walk in clinic pt states she has been having palpitations for 3 weeks and within one week sx are worsening. Pt has been belching and passing gas and thought was all from that but today pt went to walk in clinic and was told to come to ed after ecg done     Angelastephaniecrys Sheldon is a 62 y.o. female who presents to the emergency department for evaluation palpitations. Patient reports been usual state of health until . She said that she just got off the phone talking with her son when she developed what she thought was GERD type symptoms and occasional palpitations. She has had symptoms for the last very long she did not think much of it she thought it was just due to reflux disease. She says she was having intermittent episodes of palpitations which she attributed to too much caffeine usage. She said she cut back on drinks containing caffeine. To make sure that she was not taking any other natural stimulants. Stop taking herbal supplements as well. Patient reports that over the course the past week she has had continuous amounts of sensation of palpitations and her heart flip-flopping. Denies any chest pain or shortness of breath. Patient denies fevers no no swelling of legs or feet no history of DVTs or PEs and no recent changes of diet or medications. Patient does report that she also quit smoking 1 week ago and is unsure if this is contributed to her symptoms as well. Currently asymptomatic at this time. Patient went to an outside facility with advised to come to the emergency department for evaluation.     ROS:     At least 10 systems reviewed and otherwise acutely negative except as in the [address] Past History     Past Medical History:   Diagnosis Date    COPD (chronic obstructive pulmonary disease) ([address]     Denies COPD, uses inhaler for SOB - prn - hx: smoking    H/O echocardiogram 2018    EF 55-60%. No significant valvular disease. Hernia, umbilical     History of cardiac monitoring 2018    Conclusion: 30 days event monitor suggesting sinus rhythm with symptomatic sinus tachycardia. So the symptoms are reported during normal rate and rhythm    Hyperlipidemia LDL goal <130 2018    Hypertension     Follows with PCP    Hypothyroidism     Prolonged emergence from general anesthesia     Tachycardia     Wears partial dentures      Past Surgical History:   Procedure Laterality Date    CARDIAC CATHETERIZATION      WNL per pt - ([place])    [address] Bilateral     CHOLECYSTECTOMY      ECTOPIC PREGNANCY SURGERY  1991    ROTATOR CUFF REPAIR  2020    right side    SHOULDER ARTHROSCOPY Right 2020    RIGHT SHOULDER ARTHROSCOPY, SUBACROMIAL DECOMPRESSION, DISTAL CLAVICLE RESECTION DEBRIDEMENT ROTATOR CUFF REPAIR performed by Josue Carroll DO at [address] [facility] Right 2020    Diagnostic arthroscopy, right shoulder with rotator cuff repair.   2. subacromial decompression 3. distal clavicle resection 4. extensive debridement    TUBAL LIGATION       Social History     Socioeconomic History    Marital status: Single     Spouse name: None    Number of children: None    Years of education: None    Highest education level: None   Tobacco Use    Smoking status: Former     Packs/day: 0.50     Years: 33.00     Pack years: 16.50     Types: Cigarettes     Start date: 0     Quit date: 2022     Years since quittin.3    Smokeless tobacco: Never   Vaping Use    Vaping Use: Never used   Substance and Sexual Activity    Alcohol use: No    Drug use: Not Currently     Types: Methamphetamines (Crystal Meth), Marijuana (Ledon Espinoza) Comment: Not used since 1997    Sexual activity: Yes     Partners: Male       Medications/Allergies     Previous Medications    ALBUTEROL SULFATE HFA (VENTOLIN HFA) 108 (90 BASE) MCG/ACT INHALER    Inhale 2 puffs into the lungs 4 times daily as needed for Wheezing    ASPIRIN 81 MG CHEWABLE TABLET    Take 81 mg by mouth daily    EZETIMIBE (ZETIA) 10 MG TABLET    Take 1 tablet by mouth once daily    IPRATROPIUM (ATROVENT HFA) 17 MCG/ACT INHALER    Inhale 1 puff into the lungs 3 times daily    LEVOTHYROXINE (SYNTHROID) 100 MCG TABLET    Take 1 tablet by mouth once daily    LISINOPRIL (PRINIVIL;ZESTRIL) 10 MG TABLET    Take 1 tablet by mouth once daily    MAGNESIUM PO    Take by mouth     Allergies   Allergen Reactions    Keflex [Cephalexin]      Shut kidneys down    Meloxicam Shortness Of Breath    Penicillins Anaphylaxis    Statins Swelling     Throat swelling, vomiting    Cephalosporins Itching    Sulfa Antibiotics Itching    Tramadol Itching    Aspirin     Codeine Hives and Nausea And Vomiting    Naproxen Nausea And Vomiting     Dizzy lightheaded        Physical Exam       ED Triage Vitals [12/15/22 1537]   BP Temp Temp Source Heart Rate Resp SpO2 Height Weight   (!) 175/86 98.3 °F (36.8 °C) Oral 76 22 99 % 5' 6\" (1.676 m) 201 lb 12.8 oz (91.5 kg)     GENERAL APPEARANCE: Awake and alert. Cooperative. No acute distress. HEAD: Normocephalic. Atraumatic. EYES: Sclera anicteric. Pupils equal round reactive to light extraocular movements are intact  ENT: Tolerates saliva. No trismus. Moist mucous membranes  NECK: Supple. Trachea midline. No meningismus  CARDIO: RRR. Radial pulse 2+. No murmurs rubs or gallops appreciated  LUNGS: Respirations unlabored. CTAB. No accessory muscle usage noted. No wheezes rales rhonchi or stridor. ABDOMEN: Soft. Non-distended. Non-tender. No tenderness in right upper quadrant or right lower quadrant to deep palpation  EXTREMITIES: No acute deformities.   No unilateral leg swelling or tenderness behind either one of calves  SKIN: Warm and dry. No erythema edema or rashes appreciated  NEUROLOGICAL:  Cranial nerves II through XII grossly intact. No gross facial drooping. Moves all 4 extremities spontaneously. PSYCHIATRIC: Normal mood. Alert and oriented x3. No reported active suicidality or homicidality.     Diagnostics   Labs:  Results for orders placed or performed during the hospital encounter of 12/15/22   CBC with Auto Differential   Result Value Ref Range    WBC 9.0 4.0 - 10.5 K/CU MM    RBC 5.11 4.2 - 5.4 M/CU MM    Hemoglobin 15.2 12.5 - 16.0 GM/DL    Hematocrit 44.2 37 - 47 %    MCV 86.5 78 - 100 FL    MCH 29.7 27 - 31 PG    MCHC 34.4 32.0 - 36.0 %    RDW 12.3 11.7 - 14.9 %    Platelets 264 918 - 023 K/CU MM    MPV 10.5 7.5 - 11.1 FL    Differential Type AUTOMATED DIFFERENTIAL     Segs Relative 51.1 36 - 66 %    Lymphocytes % 36.6 24 - 44 %    Monocytes % 6.7 (H) 0 - 4 %    Eosinophils % 4.7 (H) 0 - 3 %    Basophils % 0.8 0 - 1 %    Segs Absolute 4.6 K/CU MM    Lymphocytes Absolute 3.3 K/CU MM    Monocytes Absolute 0.6 K/CU MM    Eosinophils Absolute 0.4 K/CU MM    Basophils Absolute 0.1 K/CU MM    Immature Neutrophil % 0.1 0 - 0.43 %    Total Immature Neutrophil 0.01 K/CU MM   CMP   Result Value Ref Range    Sodium 139 135 - 145 MMOL/L    Potassium 4.3 3.5 - 5.1 MMOL/L    Chloride 104 99 - 110 mMol/L    CO2 24 21 - 32 MMOL/L    BUN 12 6 - 23 MG/DL    Creatinine 0.9 0.6 - 1.1 MG/DL    Est, Glom Filt Rate >60 >60 mL/min/1.73m2    Glucose 96 70 - 99 MG/DL    Calcium 9.5 8.3 - 10.6 MG/DL    Albumin 4.1 3.4 - 5.0 GM/DL    Total Protein 7.3 6.4 - 8.2 GM/DL    Total Bilirubin 0.3 0.0 - 1.0 MG/DL    ALT 17 10 - 40 U/L    AST 19 15 - 37 IU/L    Alkaline Phosphatase 73 40 - 129 IU/L    Anion Gap 11 4 - 16   Magnesium   Result Value Ref Range    Magnesium 1.8 1.8 - 2.4 mg/dl   Troponin   Result Value Ref Range    Troponin T <0.010 <0.01 NG/ML   Urinalysis   Result Value Ref Range    Color, UA YELLOW YELLOW    Clarity, UA CLEAR CLEAR    Glucose, Urine NEGATIVE NEGATIVE MG/DL    Bilirubin Urine NEGATIVE NEGATIVE MG/DL    Ketones, Urine NEGATIVE NEGATIVE MG/DL    Specific Gravity, UA 1.015 1.001 - 1.035    Blood, Urine TRACE (A) NEGATIVE    pH, Urine 6.0 5.0 - 8.0    Protein, UA NEGATIVE NEGATIVE MG/DL    Urobilinogen, Urine 0.2 0.2 - 1.0 MG/DL    Nitrite Urine, Quantitative NEGATIVE NEGATIVE    Leukocyte Esterase, Urine SMALL NUMBER OR AMOUNT OBSERVED (A) NEGATIVE   Microscopic Urinalysis   Result Value Ref Range    RBC, UA 1 0 - 6 /HPF    WBC, UA 3 0 - 5 /HPF    Epithelial Cells, UA 2 /HPF    Cast Type NO CAST FORMS SEEN NO CAST FORMS SEEN /HPF    Bacteria, UA NEGATIVE NEGATIVE /HPF    Crystal Type NEGATIVE NEGATIVE /HPF   Troponin   Result Value Ref Range    Troponin T <0.010 <0.01 NG/ML   EKG 12 Lead   Result Value Ref Range    Ventricular Rate 81 BPM    Atrial Rate 81 BPM    P-R Interval 160 ms    QRS Duration 76 ms    Q-T Interval 374 ms    QTc Calculation (Bazett) 434 ms    P Axis 49 degrees    R Axis 40 degrees    T Axis 62 degrees    Diagnosis       Sinus rhythm with occasional premature ventricular complexes  Possible Anterior infarct , age undetermined  Abnormal ECG  When compared with ECG of 24-SEP-2021 00:10,  No significant change was found  Confirmed by CHERELLE Mclain ([id number]) on 12/15/2022 5:51:38 PM       Radiographs:  XR CHEST (2 VW)    Result Date: 12/15/2022  EXAMINATION: TWO XRAY VIEWS OF THE CHEST 12/15/2022 1:27 pm COMPARISON: 11/02/2021 HISTORY: ORDERING SYSTEM PROVIDED HISTORY: dyspnea TECHNOLOGIST PROVIDED HISTORY: Reason for exam:->dyspnea FINDINGS: The cardial-pericardial silhouette is unremarkable in appearance. The lungs are clear. No pneumothorax is found. No free air is seen. No acute bony abnormality. No acute abnormality identified.       Procedures/EKG:   Patient's EKG is interpreted by me sinus rhythm rate 81   no ST ovation no ST depression I appreciate no acute ischemia or infarctions EKG no old EKGs with which to compare. Multiple PVCs noted. ED Course and MDM   In brief, Noemi Chatman is a 62 y.o. female who presented to the emergency department for evaluation of this. Based the patient's history physical would be concern for possible PVCs. Patient is not a smoker. We will be concerned about multifocal atrial tachycardia. She has not had a tacky dysrhythmia at this time. Did review patient's imaging studies and laboratory work as noted above. On reevaluation patient resting comfortably. Initial laboratory work is unremarkable. Discussed the findings with the patient. Advised her that she does throw multiple PVCs as noted on her EKG and monitor. She has had 2 negative troponins here she would like to go home. Recommend close follow-up with cardiology in the next 24 to 48 hours call to schedule appointment. Return to the emergency department for chest pain, shortness of breath, syncope, weakness or any other concerning symptoms she did express a verbal understanding these instructions and does feel comfortable to be discharged home    ED Medication Orders (From admission, onward)      None            Final Impression      1. Palpitations    2.  PVC (premature ventricular contraction)      DISPOSITION Decision To Discharge 12/15/2022 06:25:13 PM         (Please note that portions of this note may have been completed with a voice recognition program. Efforts were made to edit the dictations but occasionally words are mis-transcribed.)    Ramiro Merida MD  [id number] Rivera MD  12/15/22 [id number]

## 2022-12-20 ENCOUNTER — NURSE ONLY (OUTPATIENT)
Dept: CARDIOLOGY CLINIC | Age: 57
End: 2022-12-20

## 2022-12-20 ENCOUNTER — INITIAL CONSULT (OUTPATIENT)
Dept: CARDIOLOGY CLINIC | Age: 57
End: 2022-12-20

## 2022-12-20 VITALS
HEART RATE: 80 BPM | HEIGHT: 66 IN | BODY MASS INDEX: 32.78 KG/M2 | SYSTOLIC BLOOD PRESSURE: 122 MMHG | WEIGHT: 204 LBS | RESPIRATION RATE: 16 BRPM | DIASTOLIC BLOOD PRESSURE: 72 MMHG

## 2022-12-20 DIAGNOSIS — I10 ESSENTIAL HYPERTENSION: ICD-10-CM

## 2022-12-20 DIAGNOSIS — Z87.891 FORMER SMOKER: ICD-10-CM

## 2022-12-20 DIAGNOSIS — R00.2 PALPITATIONS: Primary | ICD-10-CM

## 2022-12-20 DIAGNOSIS — E78.5 HYPERLIPIDEMIA LDL GOAL <130: ICD-10-CM

## 2022-12-20 DIAGNOSIS — M79.622 LEFT UPPER ARM PAIN: ICD-10-CM

## 2022-12-20 DIAGNOSIS — I49.3 FREQUENT PVCS: ICD-10-CM

## 2022-12-20 PROCEDURE — 99214 OFFICE O/P EST MOD 30 MIN: CPT | Performed by: INTERNAL MEDICINE

## 2022-12-20 PROCEDURE — 3078F DIAST BP <80 MM HG: CPT | Performed by: INTERNAL MEDICINE

## 2022-12-20 PROCEDURE — 3074F SYST BP LT 130 MM HG: CPT | Performed by: INTERNAL MEDICINE

## 2022-12-20 NOTE — PATIENT INSTRUCTIONS
48 hour Holter and stress test and follow up for the results. Stop Zetia. Continue to refrain from smoking, alcohol and caffiene chocolate. Counseled for stress management. Multiple questions answered. Patient verbalizes understanding and asks relevant questions. Follow up in 4 weeks, sooner if needed.

## 2022-12-20 NOTE — ASSESSMENT & PLAN NOTE
Atypical for it be of cardiac etiology. We will obtain a regular stress test to evaluate it further.

## 2022-12-20 NOTE — ASSESSMENT & PLAN NOTE
Patient's LDL has never been more than 130 in the last few years. I would discontinue Zetia and continue with diet and weight management and exercises. Her 10-year ASCVD score is less than 4% which is low.

## 2022-12-20 NOTE — ASSESSMENT & PLAN NOTE
Patient is in a lot of stress. She is counseled for stress management and we will obtain a 48-hour Holter monitor and follow-up. Continue to refrain from any alcohol caffeine nicotine.

## 2022-12-20 NOTE — PROGRESS NOTES
CARDIOLOGY CONSULTATION    Ric Ruiz  1965    Dear Dr. Branden Ruiz, APRN - CNP    Thank you for asking me to participate in care of Ric Ruiz    Chief Complaint   Patient presents with    Hyperlipidemia     Pt states she has episodes of palpitations. Pt states she has left sided pain under left armpit. History of present illness:  Ric Ruiz is a 62 y.o. female is seeing me for interscapular pain and increased palpitations symptoms make her dizzy. She reports that recently she started taking herbal products in order to lose weight and it was recommended by her friends and she got this bottles from one of the person she was referred to by her friends. She felt this may be responsible for and she stopped taking them on December 15 after she saw PCP and had an EKG done which showed frequent PVCs. She does not feel like they have gone away come to see me today. She is also under a lot of stress as she reports that she broke up with her boyfriend recently and son who lives in [place] has severe depression and heart problem and he would not take any medications and she is afraid he may die. She quit smoking 6 days ago also. She has decided to leave healthy and is consuming more healthy diet including fruits vegetables lately. Patient has seen me in 2018 for chest pains and palpitations and at that time her echocardiogram was normal and cardiac monitor suggested symptomatic sinus tachycardia and she was reassured and she was asked to follow-up on as-needed basis. She is a former alcoholic and does not drink anymore. She has also given up on caffeine and chocolate recently.     REVIEW OF SYSTEMS:   Constitutional: Denies generalized weakness  Eyes:  Denies change in visual acuity  HENT:  Denies nasal congestion or sore throat  Respiratory:  Denies cough or shortness of breath  Cardiovascular: as in HPI  GI:  Denies abdominal pain, complains of nausea and vomiting  : Denies dysuria  Musculoskeletal:  Denies back pain or joint pain  Integument:  Denies rash  Neurologic:  Denies headache, focal weakness or sensory changes  \"Remaining review of systems reviewed and negative. Past medical history:  has a past medical history of COPD (chronic obstructive pulmonary disease) ([facility] [address] H/O echocardiogram, Hernia, umbilical, History of cardiac monitoring, Hyperlipidemia LDL goal <130, Hypertension, Hypothyroidism, Left upper arm pain, Palpitations, Prolonged emergence from general anesthesia, Tachycardia, and Wears partial dentures. Past surgical history:  has a past surgical history that includes Carpal tunnel release (Bilateral, ); Tubal ligation (); Ectopic pregnancy surgery (); Cholecystectomy (); Cardiac catheterization (); shoulder surgery (Right, 2020); Shoulder arthroscopy (Right, 2020); and Rotator cuff repair (2020). Social History:   Social History     Tobacco Use    Smoking status: Former     Packs/day: 0.50     Years: 33.00     Pack years: 16.50     Types: Cigarettes     Start date:      Quit date: 2022     Years since quittin.3    Smokeless tobacco: Never   Substance Use Topics    Alcohol use: No     Family history: family history includes Cancer in her father and mother; Diabetes in her mother; Glaucoma in her mother; Heart Disease in her father; Hypertension in her mother; Kidney Disease in her brother and mother; Lizz Grout in her father; Obesity in her brother; Stroke in her mother; Thyroid Cancer in her mother; Thyroid Disease in her brother and mother.   Allergies   Allergen Reactions    Keflex [Cephalexin]      Shut kidneys down    Meloxicam Shortness Of Breath    Penicillins Anaphylaxis    Statins Swelling     Throat swelling, vomiting    Cephalosporins Itching    Sulfa Antibiotics Itching    Tramadol Itching    Aspirin     Codeine Hives and Nausea And Vomiting    Naproxen Nausea And Vomiting     Dizzy lightheaded     Prior to Admission medications    Medication Sig Start Date End Date Taking? Authorizing Provider   albuterol sulfate HFA (VENTOLIN HFA) 108 (90 Base) MCG/ACT inhaler Inhale 2 puffs into the lungs 4 times daily as needed for Wheezing 10/27/22  Yes Reema Leonard   levothyroxine (SYNTHROID) 100 MCG tablet Take 1 tablet by mouth once daily 8/3/22  Yes HUMA Forbes CNP   lisinopril (PRINIVIL;ZESTRIL) 10 MG tablet Take 1 tablet by mouth once daily  Patient taking differently: 5 mg Take 1 tablet by mouth once daily 8/3/22  Yes HUMA Forbes CNP   aspirin 81 MG chewable tablet Take 81 mg by mouth daily   Yes Historical Provider, MD   ipratropium (ATROVENT HFA) 17 MCG/ACT inhaler Inhale 1 puff into the lungs 3 times daily 10/27/22 10/27/23  Reema Leonard   MAGNESIUM PO Take by mouth  Patient not taking: Reported on 12/20/2022    Historical Provider, MD     Physical Examination:    Vitals:    12/20/22 1416   BP: 122/72   Pulse: 80   Resp: 16   Weight: 204 lb (92.5 kg)   Height: 5' 6\" (1.676 m)      Body mass index is 32.93 kg/m². Wt Readings from Last 3 Encounters:   12/20/22 204 lb (92.5 kg)   12/15/22 201 lb 12.8 oz (91.5 kg)   12/15/22 210 lb (95.3 kg)     Constitutional: Moderately overweight female in no apparent distress. Eyes: Pupils are equal no conjunctival pallor noted  ENT: Unremarkable  NECK: No JVP or thyromegaly  Cardiovascular: Auscultation: Normal S1 and S2. No murmurs or gallops noted and there is no click or rub noted. Carotids negative for bruits. Peripheral pulses: Pulses are 1+  Respiratory:  Respiratory effort is normal.  Breath sounds are clear to auscultation  Extremities:  No edema, clubbing,  Cyanosis, petechiae. SKIN: Warm and well perfused, no pallor or cyanosis  Abdomen:  No masses or tenderness. No organomegaly noted. Neurologic:  Oriented to time, place, and person and non-anxious. No focal neurological deficit noted.   Psychiatric: AM during sinus tachycardia with a rate of 144 bpm and again on August 6, 2018 at 8:20 AM and 10:16 AM and the rate was 126 -138 bpm.  No other significant arrhythmias are noted. Patient reported fatigue and titrating during normal sinus rhythm 94 bpm with occasional PVC. Conclusion: 30 days event monitor suggesting sinus rhythm with symptomatic sinus tachycardia. So the symptoms are reported during normal rate and rhythm. Stress test done on 8/28/2018 reported  Near maximal study negative for angina or ECG evidence of ischemia. Exercise tolerance is diminished for age. She stopped for shortness of    breath. Patient exercised for 3 minutes and 20 seconds of. Blood pressure response to exercise is appropriate. Echocardiogram 8/28/ 2018 reported  Left ventricular systolic function is normal with an ejection fraction of   55-60%. Grade I diastolic dysfunction. No significant valvular disease noted. No evidence of pericardial effusion. Assessment / Recommendations:    Left upper arm pain  Atypical for it be of cardiac etiology. We will obtain a regular stress test to evaluate it further. Palpitations  Probably symptomatic with frequent PVCs. She is encouraged to refrain from smoking and using caffeine or alcohol products and the herbal medicines she is taking has work-up there is an alcoholic. She is to continue to avoid them. We will obtain a 48-hour Holter monitor and follow-up. Frequent PVCs  Patient is in a lot of stress. She is counseled for stress management and we will obtain a 48-hour Holter monitor and follow-up. Continue to refrain from any alcohol caffeine nicotine. Former smoker  She quit smoking a week ago and she is encouraged to continue to refrain from smoking. Essential hypertension  Well-controlled on lisinopril 5 mg daily. Continue the same. Hyperlipidemia LDL goal <130  Patient's LDL has never been more than 130 in the last few years.   I would discontinue Zetia and continue with diet and weight management and exercises. Her 10-year ASCVD score is less than 4% which is low. 48 hour Holter and stress test and follow up for the results. Stop Zetia. Continue to refrain from smoking, alcohol and caffiene chocolate. Counseled for stress management. Multiple questions answered. Patient verbalizes understanding and asks relevant questions. Follow up in 4 weeks, sooner if needed. To Handy MD, 12/20/2022 3:10 PM     Please note this report has been produced using speech recognition software and may contain errors related to that system including errors in grammar, punctuation, and spelling, as well as words and phrases that may be inappropriate.  If there are any questions or concerns please feel free to contact the dictating provider for clarification

## 2022-12-20 NOTE — PATIENT INSTRUCTIONS
48 hour holter monitor applied [email or web address] for Palpitations Serial # [id number] . Instructed patient on monitor and proper use. Instructed on diary. When to remove and bring it back. Must leave the holter monitor on  without removing for the duration of time ordered. Answered all questions the patient had. Instructed patient to call [facility] at [phone] with any questions or concerns with the monitor.

## 2022-12-20 NOTE — ASSESSMENT & PLAN NOTE
Probably symptomatic with frequent PVCs. She is encouraged to refrain from smoking and using caffeine or alcohol products and the herbal medicines she is taking has work-up there is an alcoholic. She is to continue to avoid them. We will obtain a 48-hour Holter monitor and follow-up.

## 2022-12-29 ENCOUNTER — TELEPHONE (OUTPATIENT)
Dept: CARDIOLOGY CLINIC | Age: 57
End: 2022-12-29

## 2022-12-29 NOTE — TELEPHONE ENCOUNTER
Holter Monitor Results:    Conclusions: Abnormal study suggesting normal sinus rhythm with extremely frequent and symptomatic low-grade ventricular ectopy. Pt notified of test results and reminded to schedule Stress Test. Pt does not have insurance but said she would call and schedule.

## 2023-02-02 DIAGNOSIS — I10 ESSENTIAL HYPERTENSION: ICD-10-CM

## 2023-02-02 DIAGNOSIS — E03.9 ACQUIRED HYPOTHYROIDISM: ICD-10-CM

## 2023-02-02 RX ORDER — LEVOTHYROXINE SODIUM 0.1 MG/1
TABLET ORAL
Qty: 90 TABLET | Refills: 1 | Status: SHIPPED | OUTPATIENT
Start: 2023-02-02 | End: 2023-03-02 | Stop reason: SDUPTHER

## 2023-02-02 RX ORDER — LISINOPRIL 10 MG/1
TABLET ORAL
Qty: 90 TABLET | Refills: 1 | Status: SHIPPED | OUTPATIENT
Start: 2023-02-02 | End: 2023-03-02 | Stop reason: SDUPTHER

## 2023-02-07 ENCOUNTER — PROCEDURE VISIT (OUTPATIENT)
Dept: CARDIOLOGY CLINIC | Age: 58
End: 2023-02-07

## 2023-02-07 DIAGNOSIS — I49.3 FREQUENT PVCS: ICD-10-CM

## 2023-02-07 DIAGNOSIS — M79.622 LEFT UPPER ARM PAIN: ICD-10-CM

## 2023-03-02 ENCOUNTER — OFFICE VISIT (OUTPATIENT)
Dept: FAMILY MEDICINE CLINIC | Age: 58
End: 2023-03-02

## 2023-03-02 VITALS
SYSTOLIC BLOOD PRESSURE: 130 MMHG | WEIGHT: 205 LBS | RESPIRATION RATE: 18 BRPM | HEART RATE: 81 BPM | BODY MASS INDEX: 33.09 KG/M2 | TEMPERATURE: 97.8 F | DIASTOLIC BLOOD PRESSURE: 88 MMHG | OXYGEN SATURATION: 97 %

## 2023-03-02 DIAGNOSIS — R00.2 PALPITATIONS: ICD-10-CM

## 2023-03-02 DIAGNOSIS — F17.200 CURRENT SMOKER ON SOME DAYS: ICD-10-CM

## 2023-03-02 DIAGNOSIS — E03.9 ACQUIRED HYPOTHYROIDISM: ICD-10-CM

## 2023-03-02 DIAGNOSIS — I49.3 FREQUENT PVCS: ICD-10-CM

## 2023-03-02 DIAGNOSIS — Z12.31 ENCOUNTER FOR SCREENING MAMMOGRAM FOR BREAST CANCER: ICD-10-CM

## 2023-03-02 DIAGNOSIS — R06.2 WHEEZING: ICD-10-CM

## 2023-03-02 DIAGNOSIS — R73.03 PREDIABETES: ICD-10-CM

## 2023-03-02 DIAGNOSIS — I10 ESSENTIAL HYPERTENSION: Primary | ICD-10-CM

## 2023-03-02 DIAGNOSIS — E78.5 HYPERLIPIDEMIA LDL GOAL <130: ICD-10-CM

## 2023-03-02 LAB — TSH REFLEX: 0.65 UIU/ML (ref 0.27–4.2)

## 2023-03-02 RX ORDER — LEVOTHYROXINE SODIUM 0.1 MG/1
100 TABLET ORAL DAILY
Qty: 90 TABLET | Refills: 3 | Status: SHIPPED | OUTPATIENT
Start: 2023-03-02

## 2023-03-02 RX ORDER — LISINOPRIL 10 MG/1
10 TABLET ORAL DAILY
Qty: 90 TABLET | Refills: 3 | Status: SHIPPED | OUTPATIENT
Start: 2023-03-02

## 2023-03-02 ASSESSMENT — PATIENT HEALTH QUESTIONNAIRE - PHQ9
SUM OF ALL RESPONSES TO PHQ QUESTIONS 1-9: 0
SUM OF ALL RESPONSES TO PHQ QUESTIONS 1-9: 0
1. LITTLE INTEREST OR PLEASURE IN DOING THINGS: 0
SUM OF ALL RESPONSES TO PHQ QUESTIONS 1-9: 0
SUM OF ALL RESPONSES TO PHQ QUESTIONS 1-9: 0
2. FEELING DOWN, DEPRESSED OR HOPELESS: 0
SUM OF ALL RESPONSES TO PHQ9 QUESTIONS 1 & 2: 0

## 2023-03-02 ASSESSMENT — ENCOUNTER SYMPTOMS
CHEST TIGHTNESS: 0
COUGH: 0
ABDOMINAL PAIN: 0
VOMITING: 0
CONSTIPATION: 0
SHORTNESS OF BREATH: 0
NAUSEA: 0
WHEEZING: 0
DIARRHEA: 0

## 2023-03-02 NOTE — PROGRESS NOTES
SUBJECTIVE:    Sohan Tapia  1965  62 y.o.  female      Chief Complaint   Patient presents with    6 Month Follow-Up    Hypertension     HPI    Allergies   Allergen Reactions    Keflex [Cephalexin]      Shut kidneys down    Meloxicam Shortness Of Breath    Penicillins Anaphylaxis    Statins Swelling     Throat swelling, vomiting    Cephalosporins Itching    Sulfa Antibiotics Itching    Tramadol Itching    Aspirin     Codeine Hives and Nausea And Vomiting    Naproxen Nausea And Vomiting     Dizzy lightheaded       Current Outpatient Medications   Medication Sig Dispense Refill    Cholecalciferol (VITAMIN D3 GUMMIES ADULT PO) Take by mouth      lisinopril (PRINIVIL;ZESTRIL) 10 MG tablet Take 1 tablet by mouth daily 90 tablet 3    levothyroxine (SYNTHROID) 100 MCG tablet Take 1 tablet by mouth Daily 90 tablet 3    aspirin 81 MG chewable tablet Take 81 mg by mouth daily      albuterol sulfate HFA (VENTOLIN HFA) 108 (90 Base) MCG/ACT inhaler Inhale 2 puffs into the lungs 4 times daily as needed for Wheezing 54 g 1     No current facility-administered medications for this visit. Past Medical History:   Diagnosis Date    COPD (chronic obstructive pulmonary disease) ([facility] [address]     Denies COPD, uses inhaler for SOB - prn - hx: smoking    H/O echocardiogram 08/28/2018    EF 55-60%. No significant valvular disease. Hernia, umbilical     History of cardiac monitoring 07/31/2018    Conclusion: 30 days event monitor suggesting sinus rhythm with symptomatic sinus tachycardia. So the symptoms are reported during normal rate and rhythm    History of cardiac monitoring 12/27/2022    Abnormal study suggesting normal sinus rhythm with extremely frequent and symptomatic low-grade ventricular ectopy.     History of stress test 02/09/2023    Hyperlipidemia LDL goal <130 07/31/2018    Hypertension     Follows with PCP    Hypothyroidism     Left upper arm pain 12/20/2022    Palpitations 12/20/2022    Prolonged emergence from general anesthesia     Tachycardia     Wears partial dentures      Past Surgical History:   Procedure Laterality Date    CARDIAC CATHETERIZATION  2009    [place]NL per pt - ([place])    [address] Bilateral 2003    CHOLECYSTECTOMY  2013    ECTOPIC PREGNANCY SURGERY  1991    ROTATOR CUFF REPAIR  2020    right side    SHOULDER ARTHROSCOPY Right 2020    RIGHT SHOULDER ARTHROSCOPY, SUBACROMIAL DECOMPRESSION, DISTAL CLAVICLE RESECTION DEBRIDEMENT ROTATOR CUFF REPAIR performed by Devin Webb DO at [facility] Right 2020    Diagnostic arthroscopy, right shoulder with rotator cuff repair. 2. subacromial decompression 3. distal clavicle resection 4. extensive debridement    TUBAL LIGATION       Social History     Socioeconomic History    Marital status: Single     Spouse name: None    Number of children: None    Years of education: None    Highest education level: None   Tobacco Use    Smoking status: Some Days     Packs/day: 0.50     Years: 33.00     Pack years: 16.50     Types: Cigarettes     Start date: 0     Last attempt to quit: 2022     Years since quittin.5    Smokeless tobacco: Never   Vaping Use    Vaping Use: Never used   Substance and Sexual Activity    Alcohol use: No    Drug use: Not Currently     Types: Methamphetamines (Crystal Meth), Marijuana (Weed)     Comment: Not used since     Sexual activity: Yes     Partners: Male         BMI 33.0-33.9,adult  She has been making dietary changes and has lost weight. Palpitations  She was having palpitations and had EKG completed and found to have PVCs. She did see cardiology and had a stress test completed. She stopped her caffeine and has made dietary changes. Feels the palpitations have improved. She was released from cardiology.      Hypothyroidism  No symptoms of hypo or hyperthyroidism: no decreased or increased weight, no feeling cold/chilly or excessively warm, no diarrhea or constipation, no undue sweatiness, anxiety or palpitations. Essential hypertension  She will check her blood pressure. States that she is not always taking her lisinopril and will cut it in half at times. Blood pressures typically in the 130s over 90s if she does not take any medication. Patient denies any exertional chest pain, dyspnea, palpitations, syncope, orthopnea, edema or paroxysmal nocturnal dyspnea. Current smoker on some days  Admits to smoking on some days. Smoking a max of 8 cigarettes a week. Hyperlipidemia LDL goal <130  Cardiology stopped her Zetia. States that she has been making dietary changes. Wheezing  No wheezing, SOB  B[place] needs albuterol     Prediabetes  She is making dietary changes. Checking blood sugar and usually ranging . Review of Systems   Constitutional:  Negative for appetite change, chills, fatigue and unexpected weight change. Respiratory:  Negative for cough, chest tightness, shortness of breath and wheezing. Cardiovascular:  Negative for chest pain, palpitations and leg swelling. Gastrointestinal:  Negative for abdominal pain, constipation, diarrhea, nausea and vomiting. Endocrine: Negative for cold intolerance, heat intolerance, polydipsia, polyphagia and polyuria. Musculoskeletal:  Negative for arthralgias. Neurological:  Negative for weakness, numbness and headaches. Hematological:  Negative for adenopathy. OBJECTIVE:    /88 (Site: Left Upper Arm, Position: Sitting, Cuff Size: Large Adult)   Pulse 81   Temp 97.8 °F (36.6 °C)   Resp 18   Wt 205 lb (93 kg)   SpO2 97%   BMI 33.09 kg/m²     Physical Exam  Constitutional:       Appearance: Normal appearance. She is well-developed. HENT:      Head: Normocephalic and atraumatic. Right Ear: Hearing normal.      Left Ear: Hearing normal.      Nose: Nose normal.      Mouth/Throat:      Mouth: Mucous membranes are moist.   Neck:      Vascular: No carotid bruit or JVD. Cardiovascular:      Rate and Rhythm: Normal rate and regular rhythm. Heart sounds: Normal heart sounds. No murmur heard. No friction rub. No gallop. Pulmonary:      Effort: Pulmonary effort is normal. No respiratory distress. Breath sounds: Normal breath sounds. No wheezing, rhonchi or rales. Abdominal:      Palpations: Abdomen is soft. Musculoskeletal:         General: Normal range of motion. Cervical back: Normal range of motion and neck supple. Skin:     General: Skin is warm and dry. Neurological:      General: No focal deficit present. Mental Status: She is alert and oriented to person, place, and time. Psychiatric:         Mood and Affect: Mood normal.         Behavior: Behavior normal. Behavior is cooperative. Thought Content: Thought content normal.       ASSESSMENT/PLAN:    1. Encounter for screening mammogram for breast cancer  Patient self pay. Discuss options to obtain screening mammogram    2. Essential hypertension  Continue dietary changes, weight loss. Monitor BP  - lisinopril (PRINIVIL;ZESTRIL) 10 MG tablet; Take 1 tablet by mouth daily  Dispense: 90 tablet; Refill: 3    3. Acquired hypothyroidism  Recheck. Synthroid refilled  - levothyroxine (SYNTHROID) 100 MCG tablet; Take 1 tablet by mouth Daily  Dispense: 90 tablet; Refill: 3  - TSH with Reflex    4. Frequent PVCs  Improved with lifestyle changes    5. BMI 33.0-33.9,adult  The patient is asked to make an attempt to improve diet and exercise patterns to aid in medical management of this problem. 6. Palpitations  Improved with lifestyle changes    7. Current smoker on some days  Counseled on cessation    8. Hyperlipidemia LDL goal <130  Continue dietary changes    9. Wheezing  Avoid smoking. Albuterol PRN    10. Prediabetes  The patient is asked to make an attempt to improve diet and exercise patterns to aid in medical management of this problem. No follow-ups on file.       (Please note that portions of this note may have been completed with a voice recognition program. Efforts were made to edit the dictations but occasionally words aremis-transcribed.)

## 2023-03-02 NOTE — ASSESSMENT & PLAN NOTE
She will check her blood pressure. States that she is not always taking her lisinopril and will cut it in half at times. Blood pressures typically in the 130s over 90s if she does not take any medication. Patient denies any exertional chest pain, dyspnea, palpitations, syncope, orthopnea, edema or paroxysmal nocturnal dyspnea.

## 2023-03-02 NOTE — ASSESSMENT & PLAN NOTE
She was having palpitations and had EKG completed and found to have PVCs. She did see cardiology and had a stress test completed. She stopped her caffeine and has made dietary changes. Feels the palpitations have improved. She was released from cardiology.

## 2023-11-29 DIAGNOSIS — E03.9 ACQUIRED HYPOTHYROIDISM: ICD-10-CM

## 2023-11-29 RX ORDER — LEVOTHYROXINE SODIUM 0.1 MG/1
100 TABLET ORAL DAILY
Qty: 90 TABLET | Refills: 0 | Status: SHIPPED | OUTPATIENT
Start: 2023-11-29

## 2023-11-29 NOTE — TELEPHONE ENCOUNTER
Advise pt to find a new PCP as Charbel Gordon has left the practice. One refill sent to pharmacy as bridge to new pt appt. Thanks.

## 2023-11-30 NOTE — TELEPHONE ENCOUNTER
Spoke with pt. Advised of message from provider pt. Verbalized understanding. No further action at this time.

## 2024-03-20 ENCOUNTER — HOSPITAL ENCOUNTER (EMERGENCY)
Age: 59
Discharge: HOME | End: 2024-03-20

## 2024-03-20 VITALS
HEIGHT: 66 IN | OXYGEN SATURATION: 98 % | DIASTOLIC BLOOD PRESSURE: 106 MMHG | TEMPERATURE: 98.1 F | RESPIRATION RATE: 18 BRPM | SYSTOLIC BLOOD PRESSURE: 161 MMHG | HEART RATE: 87 BPM | WEIGHT: 171 LBS | BODY MASS INDEX: 27.48 KG/M2

## 2024-03-20 DIAGNOSIS — S61.412A LACERATION OF LEFT HAND, FOREIGN BODY PRESENCE UNSPECIFIED, INITIAL ENCOUNTER: Primary | ICD-10-CM

## 2024-03-20 PROCEDURE — 99283 EMERGENCY DEPT VISIT LOW MDM: CPT

## 2024-03-20 PROCEDURE — 12002 RPR S/N/AX/GEN/TRNK2.6-7.5CM: CPT

## 2024-03-20 PROCEDURE — 2500000003 HC RX 250 WO HCPCS: Performed by: NURSE PRACTITIONER

## 2024-03-20 PROCEDURE — 6370000000 HC RX 637 (ALT 250 FOR IP): Performed by: NURSE PRACTITIONER

## 2024-03-20 RX ORDER — LIDOCAINE HYDROCHLORIDE 20 MG/ML
10 INJECTION, SOLUTION INFILTRATION; PERINEURAL ONCE
Status: COMPLETED | OUTPATIENT
Start: 2024-03-20 | End: 2024-03-20

## 2024-03-20 RX ORDER — GINSENG 100 MG
CAPSULE ORAL ONCE
Status: COMPLETED | OUTPATIENT
Start: 2024-03-20 | End: 2024-03-20

## 2024-03-20 RX ORDER — CLINDAMYCIN HYDROCHLORIDE 300 MG/1
300 CAPSULE ORAL 3 TIMES DAILY
Qty: 30 CAPSULE | Refills: 0 | Status: SHIPPED | OUTPATIENT
Start: 2024-03-20 | End: 2024-03-30

## 2024-03-20 RX ADMIN — BACITRACIN: 500 OINTMENT TOPICAL at 10:17

## 2024-03-20 RX ADMIN — LIDOCAINE HYDROCHLORIDE 10 ML: 20 INJECTION, SOLUTION INFILTRATION; PERINEURAL at 09:06

## 2024-03-20 ASSESSMENT — PAIN - FUNCTIONAL ASSESSMENT: PAIN_FUNCTIONAL_ASSESSMENT: ACTIVITIES ARE NOT PREVENTED

## 2024-03-20 ASSESSMENT — PAIN DESCRIPTION - LOCATION
LOCATION: HAND
LOCATION: HAND

## 2024-03-20 ASSESSMENT — PAIN DESCRIPTION - DESCRIPTORS: DESCRIPTORS: BURNING

## 2024-03-20 ASSESSMENT — PAIN SCALES - GENERAL
PAINLEVEL_OUTOF10: 8
PAINLEVEL_OUTOF10: 7

## 2024-03-20 ASSESSMENT — PAIN DESCRIPTION - ORIENTATION
ORIENTATION: LEFT
ORIENTATION: LEFT

## 2024-03-20 NOTE — DISCHARGE INSTRUCTIONS
1. Please call your primary care provider to schedule an appointment for reevaluation in 2-4 days for re-evaluation of your wound for any signs of infection or worsening. (If you are unable to follow up with your primary care provider, you can return to this emergency department.)  2. Sutures should be removed in 7-10 days.  You can do this at you family doctor, urgent care, or if needed return to this emergency department.  3. Return to Emergency Department with any drainage, discharge, pus, worsening redness, and swelling, worsening pain, difficulty moving the area around the wound, worsening symptoms or any new concerns.  4. Take good care of your wound to help healing and prevent infection: Keep the wound and bandaged for the next 24 hours.  After that you can bandage it daily to help keep it free from debris or contaminants.   Change the bandage daily or whenever it becomes wet or soiled. You can get the wound wet but do not soak or submerge the wound (avoid bathtubs, dishwater, hot tubs, swimming). Until the wound dries, if you want you can apply over the counter antibiotic ointment (e.g. bacitracin or Neosporin) if you are not allergic or have any sensitivities to this medications.  Avoid activities that put excessive force or tension on your wound, which may cause your wound to re-open.

## 2024-03-20 NOTE — ED PROVIDER NOTES
[facility] EMERGENCY DEPARTMENT  EMERGENCY DEPARTMENT ENCOUNTER      Pt Name: Mindy Hurtado  MRN: [id number]  Birthdate 1965  Date of evaluation: 3/20/2024  Provider: HUMA Westfall CNP  PCP: Sania Cooney APRN - CNP  Note Started: 9:02 AM EDT 3/20/24    I am the Primary Clinician of Record.  NELI. I have evaluated this patient.    CHIEF COMPLAINT       Chief Complaint   Patient presents with    Laceration     Lac to left palm from a lund knife       HISTORY OF PRESENT ILLNESS: 1 or more Elements   Mindy Hurtado is a 58 y.o. female who presents to the ER with chief complaint of laceration to her left hand.  She states that she had a broken knife and forgot to secure it in the sheath.  When she opened the door to get out of the car fell out of the sheath and cutting her hand.  Last tetanus was 3 years ago.     I have reviewed the nursing triage documentation and agree unless otherwise noted.  REVIEW OF SYSTEMS :    Review of Systems   Musculoskeletal:  Negative for joint swelling.   Skin:  Positive for wound. Negative for pallor.     Positives and Pertinent negatives as per HPI.   SURGICAL HISTORY     Past Surgical History:   Procedure Laterality Date    CARDIAC CATHETERIZATION  2009    WNL per pt - ([place])    CARPAL TUNNEL RELEASE Bilateral 2003    CHOLECYSTECTOMY  2013    ECTOPIC PREGNANCY SURGERY  1991    ROTATOR CUFF REPAIR  12/31/2020    right side    SHOULDER ARTHROSCOPY Right 12/31/2020    RIGHT SHOULDER ARTHROSCOPY, SUBACROMIAL DECOMPRESSION, DISTAL CLAVICLE RESECTION DEBRIDEMENT ROTATOR CUFF REPAIR performed by Alirio Davis DO at [facility]    SHOULDER SURGERY Right 12/31/2020    Diagnostic arthroscopy, right shoulder with rotator cuff repair.  2. subacromial decompression 3. distal clavicle resection 4. extensive debridement    TUBAL LIGATION  1991       CURRENTMEDICATIONS       Discharge Medication List as of 3/20/2024 10:20 AM

## 2024-06-26 ENCOUNTER — OFFICE VISIT (OUTPATIENT)
Age: 59
End: 2024-06-26

## 2024-06-26 VITALS
SYSTOLIC BLOOD PRESSURE: 138 MMHG | WEIGHT: 168 LBS | HEART RATE: 83 BPM | BODY MASS INDEX: 27.12 KG/M2 | OXYGEN SATURATION: 97 % | RESPIRATION RATE: 20 BRPM | DIASTOLIC BLOOD PRESSURE: 86 MMHG

## 2024-06-26 DIAGNOSIS — L23.7 ALLERGIC CONTACT DERMATITIS DUE TO PLANTS, EXCEPT FOOD: ICD-10-CM

## 2024-06-26 DIAGNOSIS — Z13.31 DEPRESSION SCREENING: ICD-10-CM

## 2024-06-26 DIAGNOSIS — R73.03 PREDIABETES: ICD-10-CM

## 2024-06-26 DIAGNOSIS — E03.9 ACQUIRED HYPOTHYROIDISM: ICD-10-CM

## 2024-06-26 DIAGNOSIS — R06.2 WHEEZE: ICD-10-CM

## 2024-06-26 DIAGNOSIS — Z12.11 COLON CANCER SCREENING: ICD-10-CM

## 2024-06-26 DIAGNOSIS — Z12.31 ENCOUNTER FOR SCREENING MAMMOGRAM FOR BREAST CANCER: Primary | ICD-10-CM

## 2024-06-26 DIAGNOSIS — S50.862A INSECT BITE OF LEFT FOREARM, INITIAL ENCOUNTER: ICD-10-CM
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
## 2024-06-26 DIAGNOSIS — I10 ESSENTIAL HYPERTENSION: ICD-10-CM

## 2024-06-26 LAB — HBA1C MFR BLD: 5.5 %

## 2024-06-26 PROCEDURE — 83036 HEMOGLOBIN GLYCOSYLATED A1C: CPT

## 2024-06-26 PROCEDURE — 3075F SYST BP GE 130 - 139MM HG: CPT

## 2024-06-26 PROCEDURE — 99214 OFFICE O/P EST MOD 30 MIN: CPT

## 2024-06-26 PROCEDURE — 3079F DIAST BP 80-89 MM HG: CPT

## 2024-06-26 RX ORDER — ALBUTEROL SULFATE 90 UG/1
2 AEROSOL, METERED RESPIRATORY (INHALATION) 4 TIMES DAILY PRN
Qty: 54 G | Refills: 1 | Status: SHIPPED | OUTPATIENT
Start: 2024-06-26

## 2024-06-26 RX ORDER — BENZOCAINE/MENTHOL 6 MG-10 MG
LOZENGE MUCOUS MEMBRANE
Qty: 30 G | Refills: 1 | Status: SHIPPED | OUTPATIENT
Start: 2024-06-26 | End: 2024-07-03

## 2024-06-26 RX ORDER — LEVOTHYROXINE SODIUM 0.1 MG/1
100 TABLET ORAL DAILY
Qty: 90 TABLET | Refills: 0 | Status: SHIPPED | OUTPATIENT
Start: 2024-06-26

## 2024-06-26 SDOH — ECONOMIC STABILITY: FOOD INSECURITY: WITHIN THE PAST 12 MONTHS, THE FOOD YOU BOUGHT JUST DIDN'T LAST AND YOU DIDN'T HAVE MONEY TO GET MORE.: SOMETIMES TRUE

## 2024-06-26 SDOH — ECONOMIC STABILITY: FOOD INSECURITY: WITHIN THE PAST 12 MONTHS, YOU WORRIED THAT YOUR FOOD WOULD RUN OUT BEFORE YOU GOT MONEY TO BUY MORE.: SOMETIMES TRUE

## 2024-06-26 SDOH — ECONOMIC STABILITY: HOUSING INSECURITY
IN THE LAST 12 MONTHS, WAS THERE A TIME WHEN YOU DID NOT HAVE A STEADY PLACE TO SLEEP OR SLEPT IN A SHELTER (INCLUDING NOW)?: NO

## 2024-06-26 SDOH — ECONOMIC STABILITY: INCOME INSECURITY: HOW HARD IS IT FOR YOU TO PAY FOR THE VERY BASICS LIKE FOOD, HOUSING, MEDICAL CARE, AND HEATING?: SOMEWHAT HARD

## 2024-06-26 ASSESSMENT — PATIENT HEALTH QUESTIONNAIRE - PHQ9
SUM OF ALL RESPONSES TO PHQ QUESTIONS 1-9: 0
SUM OF ALL RESPONSES TO PHQ QUESTIONS 1-9: 0
1. LITTLE INTEREST OR PLEASURE IN DOING THINGS: NOT AT ALL
2. FEELING DOWN, DEPRESSED OR HOPELESS: NOT AT ALL
SUM OF ALL RESPONSES TO PHQ9 QUESTIONS 1 & 2: 0
SUM OF ALL RESPONSES TO PHQ QUESTIONS 1-9: 0
SUM OF ALL RESPONSES TO PHQ QUESTIONS 1-9: 0

## 2024-06-26 ASSESSMENT — ENCOUNTER SYMPTOMS
GASTROINTESTINAL NEGATIVE: 1
RESPIRATORY NEGATIVE: 1

## 2024-06-26 NOTE — PROGRESS NOTES
Mindy Hurtado (:  1965) is a 58 y.o. female,Established patient, here for evaluation of the following chief complaint(s):  Medication Refill and Other (Pt thinks she has poison ivy. All over arms, face, and in between her boobs. And thinks she has got bit my a spider in her left by her hand. )      Subjective   Pt here to establish care.    HTN- Currently not on any medication. Checking bp daily at home. Reports values around 128/80. Denies chest pain, SOB, HA, or being lightheaded or dizzy. Controlling with diet and lifestyle. Reports her palpitations have improved. Was following with cardiology in the past.     Smoking cessation- Pt has tried to stop in the past with pharmacological interventions without any luck. Reports she is smoking 5-7 cigarettes a day.     Prediabetes- Last A1c is 5.9. Working on diet and lifestyle.     Hypothyroid- Currently on synthroid. No symptoms of hypo or hyperthyroidism: Denies decrease or increase in weight, no temperature intolerances, diarrhea or constipation, undue sweatiness, anxiety, or palpitations.     Rash- Pt does landscaping for work. Reports she has had rash for the last 4 days. Reports it has been itching and spreading. Currently located on bilateral arms, right cheek, right eyelid, and midline chest. Reports no drainage. Reports using calamine lotion with some relief. She states that activity and contact irritates rash.    Insect bite- Reports noticing swelling and redness that began 5 days ago. Reports when cleaning spot last night the spot did drain. She states the spot has gotten better and swelling has gone down. Denies tenderness, warmth, or redness.             2024     8:01 AM 3/2/2023     8:40 AM 8/3/2022     9:14 AM 2021     9:51 AM 2020     8:25 AM 2019    10:42 AM 2019     1:07 PM   PHQ Scores   PHQ2 Score 0 0 0 0 0 0 0   PHQ9 Score 0 0 0 0 0 0 0         I reviewed the medical records and past history for

## 2024-06-26 NOTE — PATIENT INSTRUCTIONS
We are committed to providing you the best care possible.    If you receive a survey after visiting one of our offices, please take time to share your experience concerning your physician office visit.  These surveys are confidential and no health information about you is shared.    We are eager to improve for you and continue to give you satisfactory care, we are counting on your feedback to help make that happen.            Welcome to [facility]:    Did you know we now have a faster way for you to move through your appointment? For your convenience, we now have digital registration available. When you schedule your next appointment, you will receive a link via your email as well as a text message that will allow you to complete any paperwork digitally before your appointment.

## 2024-07-01 ENCOUNTER — TELEPHONE (OUTPATIENT)
Age: 59
End: 2024-07-01

## 2024-07-01 NOTE — TELEPHONE ENCOUNTER
Attempted to reach pt. Not able to leave VM PCP would like pt. To send Pic via my chart to determine atb as pt. Is allergic to most atb. If not able to send pic. PCP advises pt. To be evaluated at walk in clinic.

## 2024-07-01 NOTE — TELEPHONE ENCOUNTER
Pt. Returned call to the office and was not able to see my chart. Pt. States Spider bite just showing a couple extra lines than when seen at last evaluation. This information was provided to PCP. PCP advised pt. Be re-evaluated as this shows signs of of possible worsening infection. .Spoke with pt. Advised of message from PCP. Pt. Verbalized understanding and states she will try and be seen at the walk in next week when off work. No further action at this time.

## 2024-07-01 NOTE — TELEPHONE ENCOUNTER
Pt. Was in the office this past week for spider bite. Pt. Advised to reach out to PCP if bite has gotten worse. Pt. Reports bite has gotten worse and she would like antibiotics be called in as discussed. Pt. Would prefer something Generic if possible.

## 2024-07-18 ENCOUNTER — NURSE ONLY (OUTPATIENT)
Age: 59
End: 2024-07-18

## 2024-07-18 DIAGNOSIS — E03.9 ACQUIRED HYPOTHYROIDISM: ICD-10-CM

## 2024-07-18 DIAGNOSIS — I10 ESSENTIAL HYPERTENSION: ICD-10-CM

## 2024-07-18 PROCEDURE — 36415 COLL VENOUS BLD VENIPUNCTURE: CPT

## 2024-07-18 NOTE — PATIENT INSTRUCTIONS
We are committed to providing you the best care possible.    If you receive a survey after visiting one of our offices, please take time to share your experience concerning your physician office visit.  These surveys are confidential and no health information about you is shared.    We are eager to improve for you and continue to give you satisfactory care, we are counting on your feedback to help make that happen.            Welcome to [facility]:    Did you know we now have a faster way for you to move through your appointment? For your convenience, we now have digital registration available. When you schedule your next appointment, you will receive a link via your email as well as a text message that will allow you to complete any paperwork digitally before your appointment.

## 2024-07-19 LAB
ALBUMIN SERPL-MCNC: 4.2 G/DL (ref 3.4–5)
ALBUMIN/GLOB SERPL: 1.2 {RATIO} (ref 1.1–2.2)
ALP SERPL-CCNC: 81 U/L (ref 40–129)
ALT SERPL-CCNC: 20 U/L (ref 10–40)
ANION GAP SERPL CALCULATED.3IONS-SCNC: 11 MMOL/L (ref 3–16)
AST SERPL-CCNC: 24 U/L (ref 15–37)
BILIRUB SERPL-MCNC: 0.5 MG/DL (ref 0–1)
BUN SERPL-MCNC: 13 MG/DL (ref 7–20)
CALCIUM SERPL-MCNC: 9.7 MG/DL (ref 8.3–10.6)
CHLORIDE SERPL-SCNC: 107 MMOL/L (ref 99–110)
CHOLEST SERPL-MCNC: 155 MG/DL (ref 0–199)
CO2 SERPL-SCNC: 24 MMOL/L (ref 21–32)
CREAT SERPL-MCNC: 0.8 MG/DL (ref 0.6–1.1)
GFR SERPLBLD CREATININE-BSD FMLA CKD-EPI: 85 ML/MIN/{1.73_M2}
GLUCOSE SERPL-MCNC: 94 MG/DL (ref 70–99)
HDLC SERPL-MCNC: 41 MG/DL (ref 40–60)
LDL CHOLESTEROL: 94 MG/DL
POTASSIUM SERPL-SCNC: 4.3 MMOL/L (ref 3.5–5.1)
PROT SERPL-MCNC: 7.7 G/DL (ref 6.4–8.2)
SODIUM SERPL-SCNC: 142 MMOL/L (ref 136–145)
TRIGL SERPL-MCNC: 99 MG/DL (ref 0–150)
TSH SERPL DL<=0.005 MIU/L-ACNC: 1.68 UIU/ML (ref 0.27–4.2)
VLDLC SERPL CALC-MCNC: 20 MG/DL

## 2024-08-06 ENCOUNTER — OFFICE VISIT (OUTPATIENT)
Dept: CARDIOLOGY CLINIC | Age: 59
End: 2024-08-06

## 2024-08-06 VITALS — WEIGHT: 156.6 LBS | BODY MASS INDEX: 25.28 KG/M2

## 2024-08-06 DIAGNOSIS — R00.2 PALPITATIONS: Primary | ICD-10-CM

## 2024-08-06 DIAGNOSIS — I10 ESSENTIAL HYPERTENSION: ICD-10-CM

## 2024-08-06 DIAGNOSIS — R07.89 CHEST TIGHTNESS: ICD-10-CM

## 2024-08-06 DIAGNOSIS — R42 DIZZINESS: ICD-10-CM

## 2024-08-06 DIAGNOSIS — I49.3 FREQUENT PVCS: ICD-10-CM

## 2024-08-06 PROCEDURE — 99214 OFFICE O/P EST MOD 30 MIN: CPT | Performed by: INTERNAL MEDICINE

## 2024-08-06 PROCEDURE — 93000 ELECTROCARDIOGRAM COMPLETE: CPT | Performed by: INTERNAL MEDICINE

## 2024-08-06 RX ORDER — ASPIRIN 81 MG/1
81 TABLET ORAL DAILY
COMMUNITY

## 2024-08-06 NOTE — ASSESSMENT & PLAN NOTE
She has isolated PVC on today's EKG.  We will do a 24-hour Holter monitoring to quantify it further.

## 2024-08-06 NOTE — PATIENT INSTRUCTIONS
24 hour Holter, echocardiogram and stress test and follow up for the results.  Appropriate prescriptions if needed on this visit are addressed. After visit summery is provided.   Questions answered and patient verbalizes understanding. Follow up in 6 weeks,  sooner if needed.

## 2024-08-06 NOTE — PROGRESS NOTES
Mindy Hurtado  1965  [place], [place]      Chief Complaint   Patient presents with    Dizziness     Has had dizziness and lightheadedness for the past 2 months.    Chest Pain     Has left shoulder pain and left chest pain.    Palpitations     Has palpations daily. Wakes her up at night and exertion is worse     Fatigue     Has fatigue all the time.     Chief complaint and HPI:  Mindy Hurtado  is a 58 y.o. female following up with various vague symptoms of dizziness and palpitations which wakes her up at night and has been doing cardio exercises and does lawn mowing and landscaping for job and she reports some chest tightness after she is done with work and she is complaining of fatigue all the time.  She says she sleeps well but would like to sleep all the time.  Recent thyroid test has been normal.  She had seen me a couple of years ago and had noninvasive evaluation was known to have frequent PVCs at the time she was taking a lot of supplements for weight management etc. and she stopped taking them and she has cut down on caffeine and thought she was doing well up until more recently.  Denies any syncope or near syncope but does report gets dizzy when she stands up quickly.  She smokes 1 or 2 cigarettes daily.    Rest of the Cardiovascular system review is otherwise unchanged from prior encounter.  Past medical history:  has a past medical history of COPD (chronic obstructive pulmonary disease) (HCC), H/O echocardiogram, Hernia, umbilical, History of cardiac monitoring, History of cardiac monitoring, History of stress test, Hyperlipidemia LDL goal <130, Hypertension, Hypothyroidism, Left upper arm pain, Palpitations, Prolonged emergence from general anesthesia, Tachycardia, and Wears partial dentures.  Past surgical history:  has a past surgical history that includes Carpal tunnel release (Bilateral, 2003); Tubal ligation (1991); Ectopic pregnancy surgery (1991); Cholecystectomy (2013); Cardiac

## 2024-08-06 NOTE — ASSESSMENT & PLAN NOTE
She is negative for any orthostatic hypotension and her heart rate responded appropriately to changing position.  She may be symptomatic from PVCs.

## 2024-08-06 NOTE — ASSESSMENT & PLAN NOTE
Not very typical by description.  We will obtain a regular stress test and follow-up.  10-year ASCVD risk is 6.9% which is considered low.     [As Noted in HPI] : as noted in HPI [Negative] : Heme/Lymph [de-identified] : Intermittent stress

## 2024-08-06 NOTE — ASSESSMENT & PLAN NOTE
She is probably symptomatic with frequent PVCs we will repeat 24-hour Holter monitor to quantify and also to correlate with symptoms and follow-up.  She is counseled to minimize caffeine intake and quit smoking.

## 2024-08-20 ENCOUNTER — TELEPHONE (OUTPATIENT)
Dept: CARDIOLOGY CLINIC | Age: 59
End: 2024-08-20

## 2024-08-20 NOTE — TELEPHONE ENCOUNTER
Returned call to patient went over her echo results and answered questions.     Left Ventricle: Normal left ventricular systolic function with a visually estimated EF of 55 - 60%. Left ventricle size is normal. Mild septal thickening.Findings consistent with mild concentric hypertrophy. Normal wall motion. Grade I diastolic dysfunction.    Aortic Valve: Mild sclerosis of the aortic valve, noncoronary cusp. AV mean gradient is 6 mmHg. AV area by continuity VTI is 1.9 cm2.    Interatrial Septum: No interatrial shunt visualized with color Doppler. The septum is mildly bowing into the RA.    Tricuspid Valve: Normal RVSP. The estimated RVSP is 20 mmHg.    Pericardium: No pericardial effusion.    Image quality is adequate.    Patient verbalized understanding of all information given.

## 2024-08-21 ENCOUNTER — TELEPHONE (OUTPATIENT)
Dept: CARDIOLOGY CLINIC | Age: 59
End: 2024-08-21

## 2024-08-23 ENCOUNTER — TELEPHONE (OUTPATIENT)
Dept: CARDIOLOGY CLINIC | Age: 59
End: 2024-08-23

## 2024-08-23 NOTE — TELEPHONE ENCOUNTER
Called to patient the results of recent testing   Stress test -  IMPRESSION:  This is a nondiagnostic treadmill stress test as target heart rate is not achieved.  Patient's baseline EKG reveals normal sinus rhythm at 78 bpm with a PVC otherwise EKG appears to be normal.  Patient exercised for a total of 6 minutes on Derek protocol achieving a total workload of 7 METS.  She achieved 83% of age expected heart rate.  EKG tracing did not reveal any diagnostic ischemic changes and there were no arrhythmias noted.  Clinical correlation is recommended.    Patient verbalized understanding of all information given.

## 2024-08-29 ENCOUNTER — TELEPHONE (OUTPATIENT)
Dept: CARDIOLOGY CLINIC | Age: 59
End: 2024-08-29

## 2024-08-29 NOTE — TELEPHONE ENCOUNTER
Patient called into the office about testing results. Concerned about her stress test being abnormal. She would like to know the next step in her plan of care.  Exercise stress test.    ECG: Resting ECG demonstrates normal sinus rhythm.    Stress Test: A Derek protocol stress test was performed. Overall, the patient's exercise capacity was average for their age. The patient reached stage 2 of the protocol and was stressed for 6 min and 0 sec. The patient experienced no angina during the test. Hemodynamics are adequate for diagnosis. Blood pressure demonstrated a normal response and heart rate demonstrated a normal response to stress. The patient's heart rate recovery was normal.     IMPRESSION:  This is a nondiagnostic treadmill stress test as target heart rate is not achieved.  Patient's baseline EKG reveals normal sinus rhythm at 78 bpm with a PVC otherwise EKG appears to be normal.  Patient exercised for a total of 6 minutes on Derek protocol achieving a total workload of 7 METS.  She achieved 83% of age expected heart rate.  EKG tracing did not reveal any diagnostic ischemic changes and there were no arrhythmias noted.    Clinical correlation is recommended.    Any other testing need made a follow up 9/10/24 2:00

## 2024-08-29 NOTE — TELEPHONE ENCOUNTER
Returned call and made her aware to keep appointment on the 10 th of next month per Dr. Rebollar. Let her vent to me her concerns and answered all questions she had.

## 2024-08-30 ENCOUNTER — TELEPHONE (OUTPATIENT)
Dept: CARDIOLOGY CLINIC | Age: 59
End: 2024-08-30

## 2024-08-30 NOTE — TELEPHONE ENCOUNTER
Patient has appt 9/10 to go over test results.    48 hours of cardiac monitoring done to evaluate dizziness. Rhythm is normal sinus. Maximum rate of 135 bpm occurred on 8/7/2024 at 12:30 PM and the minimum rate of 49 bpm occurred on 8/8/2024 at 4:02 AM. Short run of 14 beat long atrial tachycardia noted on 8/7/2024 at 1:15 AM maximum rate was 135 bpm. Patient reported lightheadedness and dizziness on 8/6/2024 at 5:10 PM and the rhythm analysis is difficult due to significant motion artifact. Similar symptoms of noted on 8/6/2024 at 6 PM during normal sinus rhythm with PVCs. PVC burden was less than 1%.

## 2024-09-09 ENCOUNTER — TELEPHONE (OUTPATIENT)
Age: 59
End: 2024-09-09

## 2024-09-09 DIAGNOSIS — E03.9 ACQUIRED HYPOTHYROIDISM: ICD-10-CM

## 2024-09-09 RX ORDER — LEVOTHYROXINE SODIUM 100 UG/1
100 TABLET ORAL DAILY
Qty: 90 TABLET | Refills: 1 | Status: SHIPPED | OUTPATIENT
Start: 2024-09-09

## 2024-09-09 RX ORDER — LEVOTHYROXINE SODIUM 100 UG/1
100 TABLET ORAL DAILY
Qty: 90 TABLET | Refills: 0 | Status: CANCELLED | OUTPATIENT
Start: 2024-09-09

## 2024-09-10 ENCOUNTER — OFFICE VISIT (OUTPATIENT)
Dept: CARDIOLOGY CLINIC | Age: 59
End: 2024-09-10

## 2024-09-10 VITALS
SYSTOLIC BLOOD PRESSURE: 138 MMHG | DIASTOLIC BLOOD PRESSURE: 78 MMHG | BODY MASS INDEX: 25.01 KG/M2 | WEIGHT: 155.6 LBS | HEART RATE: 94 BPM | HEIGHT: 66 IN

## 2024-09-10 DIAGNOSIS — I10 ESSENTIAL HYPERTENSION: ICD-10-CM

## 2024-09-10 DIAGNOSIS — I49.3 FREQUENT PVCS: ICD-10-CM

## 2024-09-10 DIAGNOSIS — R00.2 PALPITATIONS: Primary | ICD-10-CM

## 2024-09-10 DIAGNOSIS — R42 DIZZINESS: ICD-10-CM

## 2024-09-10 DIAGNOSIS — H53.8 BLURRING OF VISION: ICD-10-CM

## 2024-09-10 PROCEDURE — 99214 OFFICE O/P EST MOD 30 MIN: CPT | Performed by: INTERNAL MEDICINE

## 2024-09-10 PROCEDURE — 3078F DIAST BP <80 MM HG: CPT | Performed by: INTERNAL MEDICINE

## 2024-09-10 PROCEDURE — 3075F SYST BP GE 130 - 139MM HG: CPT | Performed by: INTERNAL MEDICINE

## 2024-09-10 RX ORDER — METOPROLOL SUCCINATE 25 MG/1
25 TABLET, EXTENDED RELEASE ORAL DAILY
Qty: 30 TABLET | Refills: 3 | Status: SHIPPED | OUTPATIENT
Start: 2024-09-10

## 2024-10-04 ENCOUNTER — TELEPHONE (OUTPATIENT)
Dept: CARDIOLOGY CLINIC | Age: 59
End: 2024-10-04

## 2024-10-04 NOTE — TELEPHONE ENCOUNTER
Called to patient the results of recent testing   Carotid doppler -    Mild (<50%) stenosis in the right internal carotid artery.  Mild and homogeneous plaque in the right internal carotid artery.    Mild (<50%) stenosis in the left internal carotid artery.  Mild and homogeneous plaque in the left internal carotid artery.    Normal antegrade flow involving the right vertebral artery.    Normal antegrade flow involving the left vertebral artery.    Patient verbalized understanding of all information given.

## 2024-10-24 ENCOUNTER — TELEPHONE (OUTPATIENT)
Dept: CARDIOLOGY CLINIC | Age: 59
End: 2024-10-24

## 2024-10-24 NOTE — TELEPHONE ENCOUNTER
Called patient and went over Holter Monitor results. Patient voiced frustration as she is still having dizziness and feels we are missing something and that we are not doing enough to figure out what's wrong. She stated she is not taking her beta blocker due to it making her feel sick. I let her know I would let Dr. Rebollar know and would see if we could get her in sooner to discuss. She doesn't want any new medications and stated just to leave appt as is. I advised her I would let Dr. Rebollar know and that I was sorry and hoped she felt better soon. All questions answered, and voiced understanding.  48 hours of cardiac monitoring done to evaluate dizziness. Rhythm is predominantly normal sinus average rate is 79 bpm minimum rate of 47 bpm occurred at 5:37 AM on 10-24 and the maximum rate of 127 bpm occurred at 12:36 PM on 10/3/2024. Frequent PVCs are noted mostly isolated without complex ventricular arrhythmias. Ventricular bigeminy was noted on 10/1/2024 at 8:27 PM patient did not report any symptoms. Patient reported tired fatigue shortness of breath feeling lightheadedness during normal rate and rhythm and during less frequent PVCs on 10/2/2024 at 10:30 AM 12:50 PM and at 11 PM. PVC burden was 9.7%. Rare isolated 19 PACs are present without complex supraventricular arrhythmias.

## 2024-10-25 DIAGNOSIS — I49.3 FREQUENT PVCS: Primary | ICD-10-CM

## 2024-10-25 DIAGNOSIS — R42 DIZZINESS: ICD-10-CM

## 2024-11-15 ENCOUNTER — TELEPHONE (OUTPATIENT)
Dept: CARDIOLOGY CLINIC | Age: 59
End: 2024-11-15

## 2024-11-18 NOTE — TELEPHONE ENCOUNTER
Spoke with Patient and scheduled Consult with Dr. Birmingham on 12/4/24 @ 130pm.    Patient advised understanding.

## 2024-12-04 ENCOUNTER — INITIAL CONSULT (OUTPATIENT)
Dept: CARDIOLOGY CLINIC | Age: 59
End: 2024-12-04

## 2024-12-04 VITALS
WEIGHT: 162 LBS | HEART RATE: 80 BPM | SYSTOLIC BLOOD PRESSURE: 130 MMHG | RESPIRATION RATE: 18 BRPM | HEIGHT: 66 IN | DIASTOLIC BLOOD PRESSURE: 74 MMHG | BODY MASS INDEX: 26.03 KG/M2 | OXYGEN SATURATION: 96 %

## 2024-12-04 DIAGNOSIS — I49.3 FREQUENT PVCS: ICD-10-CM

## 2024-12-04 DIAGNOSIS — I47.10 SVT (SUPRAVENTRICULAR TACHYCARDIA) (HCC): Primary | ICD-10-CM

## 2024-12-04 PROCEDURE — 93000 ELECTROCARDIOGRAM COMPLETE: CPT | Performed by: INTERNAL MEDICINE

## 2024-12-04 PROCEDURE — 3075F SYST BP GE 130 - 139MM HG: CPT | Performed by: INTERNAL MEDICINE

## 2024-12-04 PROCEDURE — 99244 OFF/OP CNSLTJ NEW/EST MOD 40: CPT | Performed by: INTERNAL MEDICINE

## 2024-12-04 PROCEDURE — 3078F DIAST BP <80 MM HG: CPT | Performed by: INTERNAL MEDICINE

## 2024-12-09 DIAGNOSIS — E03.9 ACQUIRED HYPOTHYROIDISM: ICD-10-CM

## 2024-12-09 RX ORDER — LEVOTHYROXINE SODIUM 100 UG/1
100 TABLET ORAL DAILY
Qty: 90 TABLET | Refills: 1 | Status: SHIPPED | OUTPATIENT
Start: 2024-12-09

## 2024-12-10 ENCOUNTER — HOSPITAL ENCOUNTER (EMERGENCY)
Age: 59
Discharge: HOME | End: 2024-12-10
Attending: EMERGENCY MEDICINE

## 2024-12-10 ENCOUNTER — APPOINTMENT (OUTPATIENT)
Dept: GENERAL RADIOLOGY | Age: 59
End: 2024-12-10

## 2024-12-10 VITALS
OXYGEN SATURATION: 98 % | SYSTOLIC BLOOD PRESSURE: 158 MMHG | TEMPERATURE: 97.9 F | BODY MASS INDEX: 25.71 KG/M2 | WEIGHT: 160 LBS | DIASTOLIC BLOOD PRESSURE: 108 MMHG | HEIGHT: 66 IN | HEART RATE: 70 BPM | RESPIRATION RATE: 18 BRPM

## 2024-12-10 DIAGNOSIS — S20.211A CONTUSION OF RIGHT CHEST WALL, INITIAL ENCOUNTER: Primary | ICD-10-CM

## 2024-12-10 DIAGNOSIS — I10 ESSENTIAL HYPERTENSION: ICD-10-CM

## 2024-12-10 PROCEDURE — 99283 EMERGENCY DEPT VISIT LOW MDM: CPT

## 2024-12-10 PROCEDURE — 71046 X-RAY EXAM CHEST 2 VIEWS: CPT

## 2024-12-10 NOTE — ED PROVIDER NOTES
mouth daily (Patient not taking: Reported on 12/4/2024) 30 tablet 3    aspirin 81 MG EC tablet Take 1 tablet by mouth daily      albuterol sulfate HFA (VENTOLIN HFA) 108 (90 Base) MCG/ACT inhaler Inhale 2 puffs into the lungs 4 times daily as needed for Wheezing 54 g 1     Allergies   Allergen Reactions    Keflex [Cephalexin]      Shut kidneys down    Meloxicam Shortness Of Breath    Penicillins Anaphylaxis    Statins Swelling and Other (See Comments)     Throat swelling, vomiting    Cephalosporins Itching    Sulfa Antibiotics Itching    Tramadol Itching    Aspirin      Full strength only. Can tolerate 81mg     Codeine Hives and Nausea And Vomiting    Naproxen Nausea And Vomiting     Dizzy lightheaded         ROS:    Review of Systems   Respiratory:          Chest wall pain status post fall   All other systems reviewed and are negative.      Nursing Notes Reviewed    Physical Exam:    BP (!) 159/118   Pulse 72   Temp 97.9 °F (36.6 °C) (Oral)   Resp 18   Ht 1.676 m (5' 6\")   Wt 72.6 kg (160 lb)   SpO2 95%   BMI 25.82 kg/m²      ED Triage Vitals   Encounter Vitals Group      BP       Systolic BP Percentile       Diastolic BP Percentile       Pulse       Resp       Temp       Temp src       SpO2       Weight       Height       Head Circumference       Peak Flow       Pain Score       Pain Loc       Pain Education       Exclude from Growth Chart        Physical Exam  Vitals and nursing note reviewed.   Constitutional:       Appearance: Normal appearance. She is well-developed.   HENT:      Head: Normocephalic and atraumatic.      Right Ear: External ear normal.      Left Ear: External ear normal.      Nose: Nose normal.      Mouth/Throat:      Mouth: Mucous membranes are moist.   Eyes:      Pupils: Pupils are equal, round, and reactive to light.   Cardiovascular:      Rate and Rhythm: Normal rate.   Chest:      Chest wall: Tenderness present.   Abdominal:      Palpations: Abdomen is soft.   Musculoskeletal:

## 2024-12-16 DIAGNOSIS — I47.10 SVT (SUPRAVENTRICULAR TACHYCARDIA) (HCC): Primary | ICD-10-CM

## 2025-01-06 ENCOUNTER — TELEPHONE (OUTPATIENT)
Dept: CARDIOLOGY CLINIC | Age: 60
End: 2025-01-06

## 2025-01-06 NOTE — TELEPHONE ENCOUNTER
Called patient and r/s PPW apt to 1/9/2025 @ 0900. Patient is ill. Advised if no better to call back and I will r/s procedure.

## 2025-01-09 ENCOUNTER — HOSPITAL ENCOUNTER (OUTPATIENT)
Age: 60
Discharge: HOME | End: 2025-01-09

## 2025-01-09 ENCOUNTER — HOSPITAL ENCOUNTER (OUTPATIENT)
Dept: GENERAL RADIOLOGY | Age: 60
Discharge: HOME | End: 2025-01-09

## 2025-01-09 ENCOUNTER — NURSE ONLY (OUTPATIENT)
Dept: CARDIOLOGY CLINIC | Age: 60
End: 2025-01-09

## 2025-01-09 DIAGNOSIS — I47.10 SVT (SUPRAVENTRICULAR TACHYCARDIA) (HCC): ICD-10-CM

## 2025-01-09 DIAGNOSIS — I47.10 SVT (SUPRAVENTRICULAR TACHYCARDIA) (HCC): Primary | ICD-10-CM

## 2025-01-09 LAB
ANION GAP SERPL CALCULATED.3IONS-SCNC: 7 MMOL/L (ref 9–17)
BUN SERPL-MCNC: 10 MG/DL (ref 7–20)
CALCIUM SERPL-MCNC: 9.5 MG/DL (ref 8.3–10.6)
CHLORIDE SERPL-SCNC: 104 MMOL/L (ref 99–110)
CO2 SERPL-SCNC: 29 MMOL/L (ref 21–32)
CREAT SERPL-MCNC: 0.9 MG/DL (ref 0.6–1.1)
ERYTHROCYTE [DISTWIDTH] IN BLOOD BY AUTOMATED COUNT: 12.8 % (ref 11.7–14.9)
GFR, ESTIMATED: 66 ML/MIN/1.73M2
GLUCOSE SERPL-MCNC: 102 MG/DL (ref 74–99)
HCT VFR BLD AUTO: 48.4 % (ref 37–47)
HGB BLD-MCNC: 16 G/DL (ref 12.5–16)
INR PPP: 1
MAGNESIUM SERPL-MCNC: 2 MG/DL (ref 1.8–2.4)
MCH RBC QN AUTO: 29.7 PG (ref 27–31)
MCHC RBC AUTO-ENTMCNC: 33.1 G/DL (ref 32–36)
MCV RBC AUTO: 90 FL (ref 78–100)
PARTIAL THROMBOPLASTIN TIME: 29.6 SEC (ref 25.1–37.1)
PHOSPHATE SERPL-MCNC: 3.8 MG/DL (ref 2.5–4.9)
PLATELET # BLD AUTO: 232 K/UL (ref 140–440)
PMV BLD AUTO: 11 FL (ref 7.5–11.1)
POTASSIUM SERPL-SCNC: 4.9 MMOL/L (ref 3.5–5.1)
PROTHROMBIN TIME: 13.1 SEC (ref 11.7–14.5)
RBC # BLD AUTO: 5.38 M/UL (ref 4.2–5.4)
SODIUM SERPL-SCNC: 140 MMOL/L (ref 136–145)
WBC OTHER # BLD: 9.3 K/UL (ref 4–10.5)

## 2025-01-09 PROCEDURE — 84100 ASSAY OF PHOSPHORUS: CPT

## 2025-01-09 PROCEDURE — 85610 PROTHROMBIN TIME: CPT

## 2025-01-09 PROCEDURE — 80048 BASIC METABOLIC PNL TOTAL CA: CPT

## 2025-01-09 PROCEDURE — 71046 X-RAY EXAM CHEST 2 VIEWS: CPT

## 2025-01-09 PROCEDURE — 85730 THROMBOPLASTIN TIME PARTIAL: CPT

## 2025-01-09 PROCEDURE — 83735 ASSAY OF MAGNESIUM: CPT

## 2025-01-09 PROCEDURE — 85027 COMPLETE CBC AUTOMATED: CPT

## 2025-01-09 NOTE — PROGRESS NOTES
Patient here in office and educated on 1/9/2025, scheduled for EP Study/SVT ablation on 1/14/2025 @ 1330, with arrival @ 1130, @ [facility]; consents signed. Copy of orders given for labs and CXR due 1/9/2025 at [facility]. Instructions given to patient to :NPO after midnight including water the night before procedure. May take rest of morning medications day of procedure; Take Aspirin 3 days prior to procedure beginning on 1/11, 1/12, 1/13 and day of procedure. Patient voiced understanding. Copies of consent & info scanned in chart.

## 2025-01-10 ENCOUNTER — OFFICE VISIT (OUTPATIENT)
Age: 60
End: 2025-01-10

## 2025-01-10 VITALS
OXYGEN SATURATION: 97 % | WEIGHT: 163 LBS | HEART RATE: 84 BPM | DIASTOLIC BLOOD PRESSURE: 78 MMHG | BODY MASS INDEX: 26.2 KG/M2 | HEIGHT: 66 IN | SYSTOLIC BLOOD PRESSURE: 136 MMHG | RESPIRATION RATE: 18 BRPM

## 2025-01-10 DIAGNOSIS — Z53.20 BREAST SCREENING DECLINED: Primary | ICD-10-CM

## 2025-01-10 DIAGNOSIS — I10 ESSENTIAL HYPERTENSION: ICD-10-CM

## 2025-01-10 DIAGNOSIS — R06.2 WHEEZING: ICD-10-CM

## 2025-01-10 RX ORDER — ASPIRIN 81 MG/1
81 TABLET ORAL DAILY
Qty: 90 TABLET | Refills: 0 | Status: SHIPPED | OUTPATIENT
Start: 2025-01-10

## 2025-01-10 RX ORDER — ASPIRIN 81 MG/1
81 TABLET ORAL DAILY
Qty: 30 TABLET | Status: CANCELLED | OUTPATIENT
Start: 2025-01-10

## 2025-01-10 RX ORDER — LEVOTHYROXINE SODIUM 100 UG/1
100 TABLET ORAL DAILY
Qty: 90 TABLET | Refills: 1 | Status: CANCELLED | OUTPATIENT
Start: 2025-01-10

## 2025-01-10 RX ORDER — ALBUTEROL SULFATE 90 UG/1
2 INHALANT RESPIRATORY (INHALATION) 4 TIMES DAILY PRN
Qty: 54 G | Refills: 1 | Status: SHIPPED | OUTPATIENT
Start: 2025-01-10

## 2025-01-10 RX ORDER — ALBUTEROL SULFATE 90 UG/1
2 INHALANT RESPIRATORY (INHALATION) 4 TIMES DAILY PRN
Qty: 54 G | Refills: 1 | Status: CANCELLED | OUTPATIENT
Start: 2025-01-10

## 2025-01-10 SDOH — ECONOMIC STABILITY: FOOD INSECURITY: WITHIN THE PAST 12 MONTHS, THE FOOD YOU BOUGHT JUST DIDN'T LAST AND YOU DIDN'T HAVE MONEY TO GET MORE.: NEVER TRUE

## 2025-01-10 SDOH — ECONOMIC STABILITY: FOOD INSECURITY: WITHIN THE PAST 12 MONTHS, YOU WORRIED THAT YOUR FOOD WOULD RUN OUT BEFORE YOU GOT MONEY TO BUY MORE.: NEVER TRUE

## 2025-01-10 ASSESSMENT — PATIENT HEALTH QUESTIONNAIRE - PHQ9
SUM OF ALL RESPONSES TO PHQ QUESTIONS 1-9: 0
SUM OF ALL RESPONSES TO PHQ QUESTIONS 1-9: 0
1. LITTLE INTEREST OR PLEASURE IN DOING THINGS: NOT AT ALL
2. FEELING DOWN, DEPRESSED OR HOPELESS: NOT AT ALL
SUM OF ALL RESPONSES TO PHQ QUESTIONS 1-9: 0
SUM OF ALL RESPONSES TO PHQ QUESTIONS 1-9: 0
SUM OF ALL RESPONSES TO PHQ9 QUESTIONS 1 & 2: 0

## 2025-01-10 ASSESSMENT — ENCOUNTER SYMPTOMS
GASTROINTESTINAL NEGATIVE: 1
RESPIRATORY NEGATIVE: 1

## 2025-01-10 NOTE — PATIENT INSTRUCTIONS
We are committed to providing you the best care possible.    If you receive a survey after visiting one of our offices, please take time to share your experience concerning your physician office visit.  These surveys are confidential and no health information about you is shared.    We are eager to improve for you and continue to give you satisfactory care, we are counting on your feedback to help make that happen.            Welcome to [facility]:    Did you know we now have a faster way for you to move through your appointment? For your convenience, we now have digital registration available. When you schedule your next appointment, you will receive a link via your email as well as a text message that will allow you to complete any paperwork digitally before your appointment.

## 2025-01-10 NOTE — PROGRESS NOTES
Mindy Hurtado (:  1965) is a 59 y.o. female,Established patient, here for evaluation of the following chief complaint(s):  Follow-up (Follow up on medication /No questions or concerns /Having EP study on Tues )      Subjective   Pt here for routine follow up. Pt following with cardiology/EP. She is having an EP study and heart ablation on 2025. She does report she has been working on smoking cessation. No questions or concerns today.               1/10/2025     9:14 AM 2024     8:01 AM 3/2/2023     8:40 AM 8/3/2022     9:14 AM 2021     9:51 AM 2020     8:25 AM 2019    10:42 AM   PHQ Scores   PHQ2 Score 0 0 0 0 0 0 0   PHQ9 Score 0 0 0 0 0 0 0         I reviewed the medical records and past history for Mindy.    Allergies   Allergen Reactions    Keflex [Cephalexin]      Shut kidneys down    Meloxicam Shortness Of Breath    Penicillins Anaphylaxis    Statins Swelling and Other (See Comments)     Throat swelling, vomiting    Cephalosporins Itching    Sulfa Antibiotics Itching    Tramadol Itching    Aspirin      Full strength only. Can tolerate 81mg     Codeine Hives and Nausea And Vomiting    Naproxen Nausea And Vomiting     Dizzy lightheaded       Current Outpatient Medications   Medication Sig Dispense Refill    albuterol sulfate HFA (VENTOLIN HFA) 108 (90 Base) MCG/ACT inhaler Inhale 2 puffs into the lungs 4 times daily as needed for Wheezing 54 g 1    aspirin 81 MG EC tablet Take 1 tablet by mouth daily 90 tablet 0    levothyroxine (SYNTHROID) 100 MCG tablet Take 1 tablet by mouth Daily 90 tablet 1     No current facility-administered medications for this visit.       /78 (Site: Left Upper Arm, Position: Sitting, Cuff Size: Medium Adult)   Pulse 84   Resp 18   Ht 1.676 m (5' 6\")   Wt 73.9 kg (163 lb)   SpO2 97%   BMI 26.31 kg/m²     Review of Systems   Constitutional: Negative.    Respiratory: Negative.     Cardiovascular: Negative.    Gastrointestinal:

## 2025-01-14 ENCOUNTER — HOSPITAL ENCOUNTER (OUTPATIENT)
Age: 60
Setting detail: OUTPATIENT SURGERY
Discharge: HOME | End: 2025-01-14
Attending: INTERNAL MEDICINE | Admitting: INTERNAL MEDICINE

## 2025-01-14 VITALS
OXYGEN SATURATION: 97 % | TEMPERATURE: 96.2 F | RESPIRATION RATE: 16 BRPM | WEIGHT: 160 LBS | HEART RATE: 71 BPM | BODY MASS INDEX: 25.71 KG/M2 | HEIGHT: 66 IN

## 2025-01-14 DIAGNOSIS — I47.10 SVT (SUPRAVENTRICULAR TACHYCARDIA) (HCC): ICD-10-CM

## 2025-01-14 LAB
ABO + RH BLD: NORMAL
BLOOD BANK SAMPLE EXPIRATION: NORMAL
BLOOD GROUP ANTIBODIES SERPL: NEGATIVE
GLUCOSE BLD-MCNC: 84 MG/DL (ref 74–99)

## 2025-01-14 PROCEDURE — 6360000002 HC RX W HCPCS

## 2025-01-14 PROCEDURE — 86850 RBC ANTIBODY SCREEN: CPT

## 2025-01-14 PROCEDURE — 2580000003 HC RX 258

## 2025-01-14 PROCEDURE — 82962 GLUCOSE BLOOD TEST: CPT

## 2025-01-14 PROCEDURE — 86900 BLOOD TYPING SEROLOGIC ABO: CPT

## 2025-01-14 PROCEDURE — 86901 BLOOD TYPING SEROLOGIC RH(D): CPT

## 2025-01-14 PROCEDURE — 2500000003 HC RX 250 WO HCPCS

## 2025-01-14 ASSESSMENT — ENCOUNTER SYMPTOMS
VOMITING: 0
CONSTIPATION: 0
WHEEZING: 0
CHEST TIGHTNESS: 0
BACK PAIN: 0
PHOTOPHOBIA: 0
COUGH: 0
NAUSEA: 0
EYE PAIN: 0
DIARRHEA: 0
SHORTNESS OF BREATH: 1
BLOOD IN STOOL: 0
COLOR CHANGE: 0
ABDOMINAL PAIN: 0

## 2025-01-14 NOTE — PROGRESS NOTES
Dr Birmingham at bedside and explained to patient that procedure would need to be rescheduled because insurance issues/concerns regarding  patient being unaware of having to spend the night. Patient explained that Dr Birmingham wanted her to continue taking ASA 81 mg daily, and the office would contact her with a new date and time for rescheduled procedure.

## 2025-01-14 NOTE — H&P
noncoronary cusp. Interatrial Septum: No interatrial shunt visualized with color Doppler. The septum is mildly bowing into the RA. Tricuspid Valve: Normal RVSP.    H/O exercise stress test 08/22/2024    This is a nondiagnostic treadmill stress test as target heart rate is not achieved.    Hernia, umbilical     History of cardiac monitoring 07/31/2018    Conclusion: 30 days event monitor suggesting sinus rhythm with symptomatic sinus tachycardia.  So the symptoms are reported during normal rate and rhythm    History of cardiac monitoring 12/27/2022    Abnormal study suggesting normal sinus rhythm with extremely frequent and symptomatic low-grade ventricular ectopy.    History of Holter monitoring 08/2024    Rhythm is normal sinus.  Maximum rate of 135 bpm occurred on 8/7/2024 at 12:30 PM and the minimum rate of 49 bpm occurred on 8/8/2024 at 4:02 AM.  Short run of 14 beat long atrial tachycardia    History of Holter monitoring 10/2024    Rhythm is predominantly normal sinus average rate is 79 bpm minimum rate of 47 bpm occurred at 5:37 AM on 10-24 and the maximum rate of 127 bpm occurred at 12:36 PM on 10/3/2024.  Frequent PVCs are noted mostly isolated without complex ventricular arrhythmias.  Ventricular bigeminy was noted    History of stress test 02/09/2023    Hyperlipidemia LDL goal <130 07/31/2018    Hypertension     Follows with PCP    Hypothyroidism     Left upper arm pain 12/20/2022    Palpitations 12/20/2022    Prolonged emergence from general anesthesia     Tachycardia     Vascular US Duplex Carotid Bilateral 10/01/2024    Mild (<50%) stenosis in the LICA & ANKIT. Mild and homogeneous plaque in the right internal carotid artery. Normal antegrade flow involving the right & left vertebral artery.    Wears partial dentures        Surgical history :   Past Surgical History:   Procedure Laterality Date    CARDIAC CATHETERIZATION  2009    WNL per pt - ([place])    CARPAL TUNNEL RELEASE Bilateral 2003

## 2025-01-15 LAB — ECHO BSA: 1.84 M2

## 2025-01-30 ENCOUNTER — APPOINTMENT (OUTPATIENT)
Dept: GENERAL RADIOLOGY | Age: 60
End: 2025-01-30
Attending: EMERGENCY MEDICINE

## 2025-01-30 ENCOUNTER — HOSPITAL ENCOUNTER (EMERGENCY)
Age: 60
Discharge: HOME | End: 2025-01-30
Attending: STUDENT IN AN ORGANIZED HEALTH CARE EDUCATION/TRAINING PROGRAM

## 2025-01-30 VITALS
SYSTOLIC BLOOD PRESSURE: 129 MMHG | BODY MASS INDEX: 27.06 KG/M2 | HEIGHT: 66 IN | WEIGHT: 168.4 LBS | RESPIRATION RATE: 22 BRPM | OXYGEN SATURATION: 89 % | TEMPERATURE: 99.1 F | DIASTOLIC BLOOD PRESSURE: 68 MMHG | HEART RATE: 88 BPM

## 2025-01-30 DIAGNOSIS — J06.9 VIRAL UPPER RESPIRATORY TRACT INFECTION: ICD-10-CM

## 2025-01-30 DIAGNOSIS — J10.1 INFLUENZA A: Primary | ICD-10-CM

## 2025-01-30 DIAGNOSIS — R06.2 WHEEZING: ICD-10-CM

## 2025-01-30 LAB
ANION GAP SERPL CALCULATED.3IONS-SCNC: 11 MMOL/L (ref 4–16)
B PARAP IS1001 DNA NPH QL NAA+NON-PROBE: NOT DETECTED
B PERT DNA SPEC QL NAA+PROBE: NOT DETECTED
BASOPHILS # BLD: 0.05 K/UL
BASOPHILS NFR BLD: 1 % (ref 0–1)
BNP SERPL-MCNC: 398 PG/ML (ref 0–125)
BUN SERPL-MCNC: 9 MG/DL (ref 6–23)
C PNEUM DNA NPH QL NAA+NON-PROBE: NOT DETECTED
CALCIUM SERPL-MCNC: 8.5 MG/DL (ref 8.3–10.6)
CHLORIDE SERPL-SCNC: 99 MMOL/L (ref 99–110)
CO2 SERPL-SCNC: 26 MMOL/L (ref 21–32)
CREAT SERPL-MCNC: 0.8 MG/DL (ref 0.6–1.1)
EKG ATRIAL RATE: 94 BPM
EKG DIAGNOSIS: NORMAL
EKG P AXIS: 63 DEGREES
EKG P-R INTERVAL: 158 MS
EKG Q-T INTERVAL: 338 MS
EKG QRS DURATION: 78 MS
EKG QTC CALCULATION (BAZETT): 422 MS
EKG R AXIS: 47 DEGREES
EKG T AXIS: 76 DEGREES
EKG VENTRICULAR RATE: 94 BPM
EOSINOPHIL # BLD: 0.08 K/UL
EOSINOPHILS RELATIVE PERCENT: 1 % (ref 0–3)
ERYTHROCYTE [DISTWIDTH] IN BLOOD BY AUTOMATED COUNT: 12.8 % (ref 11.7–14.9)
FLUAV H1 2009 PAN RNA NPH NAA+NON-PROBE: DETECTED
FLUAV RNA NPH QL NAA+NON-PROBE: DETECTED
FLUBV RNA NPH QL NAA+NON-PROBE: NOT DETECTED
GFR, ESTIMATED: 85 ML/MIN/1.73M2
GLUCOSE SERPL-MCNC: 107 MG/DL (ref 74–99)
HADV DNA NPH QL NAA+NON-PROBE: NOT DETECTED
HCOV 229E RNA NPH QL NAA+NON-PROBE: NOT DETECTED
HCOV HKU1 RNA NPH QL NAA+NON-PROBE: NOT DETECTED
HCOV NL63 RNA NPH QL NAA+NON-PROBE: NOT DETECTED
HCOV OC43 RNA NPH QL NAA+NON-PROBE: NOT DETECTED
HCT VFR BLD AUTO: 43.5 % (ref 37–47)
HGB BLD-MCNC: 14.7 G/DL (ref 12.5–16)
HMPV RNA NPH QL NAA+NON-PROBE: NOT DETECTED
HPIV1 RNA NPH QL NAA+NON-PROBE: NOT DETECTED
HPIV2 RNA NPH QL NAA+NON-PROBE: NOT DETECTED
HPIV3 RNA NPH QL NAA+NON-PROBE: NOT DETECTED
HPIV4 RNA NPH QL NAA+NON-PROBE: NOT DETECTED
IMM GRANULOCYTES # BLD AUTO: 0.03 K/UL
IMM GRANULOCYTES NFR BLD: 1 %
LYMPHOCYTES NFR BLD: 0.4 K/UL
LYMPHOCYTES RELATIVE PERCENT: 6 % (ref 24–44)
M PNEUMO DNA NPH QL NAA+NON-PROBE: NOT DETECTED
MCH RBC QN AUTO: 29.6 PG (ref 27–31)
MCHC RBC AUTO-ENTMCNC: 33.8 G/DL (ref 32–36)
MCV RBC AUTO: 87.5 FL (ref 78–100)
MONOCYTES NFR BLD: 0.46 K/UL
MONOCYTES NFR BLD: 7 % (ref 0–4)
NEUTROPHILS NFR BLD: 84 % (ref 36–66)
NEUTS SEG NFR BLD: 5.47 K/UL
PLATELET # BLD AUTO: 161 K/UL (ref 140–440)
PMV BLD AUTO: 10.5 FL (ref 7.5–11.1)
POTASSIUM SERPL-SCNC: 3.9 MMOL/L (ref 3.5–5.1)
RBC # BLD AUTO: 4.97 M/UL (ref 4.2–5.4)
RSV RNA NPH QL NAA+NON-PROBE: NOT DETECTED
RV+EV RNA NPH QL NAA+NON-PROBE: NOT DETECTED
SARS-COV-2 RNA NPH QL NAA+NON-PROBE: NOT DETECTED
SODIUM SERPL-SCNC: 136 MMOL/L (ref 135–145)
SPECIMEN DESCRIPTION: ABNORMAL
TROPONIN I SERPL HS-MCNC: <6 NG/L (ref 0–13)
WBC OTHER # BLD: 6.5 K/UL (ref 4–10.5)

## 2025-01-30 PROCEDURE — 93010 ELECTROCARDIOGRAM REPORT: CPT | Performed by: INTERNAL MEDICINE

## 2025-01-30 PROCEDURE — 6370000000 HC RX 637 (ALT 250 FOR IP): Performed by: STUDENT IN AN ORGANIZED HEALTH CARE EDUCATION/TRAINING PROGRAM

## 2025-01-30 PROCEDURE — 83880 ASSAY OF NATRIURETIC PEPTIDE: CPT

## 2025-01-30 PROCEDURE — 2580000003 HC RX 258: Performed by: STUDENT IN AN ORGANIZED HEALTH CARE EDUCATION/TRAINING PROGRAM

## 2025-01-30 PROCEDURE — 2700000000 HC OXYGEN THERAPY PER DAY

## 2025-01-30 PROCEDURE — 94640 AIRWAY INHALATION TREATMENT: CPT

## 2025-01-30 PROCEDURE — 80048 BASIC METABOLIC PNL TOTAL CA: CPT

## 2025-01-30 PROCEDURE — 71045 X-RAY EXAM CHEST 1 VIEW: CPT

## 2025-01-30 PROCEDURE — 6370000000 HC RX 637 (ALT 250 FOR IP): Performed by: EMERGENCY MEDICINE

## 2025-01-30 PROCEDURE — 2500000003 HC RX 250 WO HCPCS: Performed by: EMERGENCY MEDICINE

## 2025-01-30 PROCEDURE — 94761 N-INVAS EAR/PLS OXIMETRY MLT: CPT

## 2025-01-30 PROCEDURE — 93005 ELECTROCARDIOGRAM TRACING: CPT | Performed by: EMERGENCY MEDICINE

## 2025-01-30 PROCEDURE — 0202U NFCT DS 22 TRGT SARS-COV-2: CPT

## 2025-01-30 PROCEDURE — 96375 TX/PRO/DX INJ NEW DRUG ADDON: CPT

## 2025-01-30 PROCEDURE — 99285 EMERGENCY DEPT VISIT HI MDM: CPT

## 2025-01-30 PROCEDURE — 6360000002 HC RX W HCPCS: Performed by: STUDENT IN AN ORGANIZED HEALTH CARE EDUCATION/TRAINING PROGRAM

## 2025-01-30 PROCEDURE — 96374 THER/PROPH/DIAG INJ IV PUSH: CPT

## 2025-01-30 PROCEDURE — 85025 COMPLETE CBC W/AUTO DIFF WBC: CPT

## 2025-01-30 PROCEDURE — 6360000002 HC RX W HCPCS: Performed by: EMERGENCY MEDICINE

## 2025-01-30 PROCEDURE — 84484 ASSAY OF TROPONIN QUANT: CPT

## 2025-01-30 RX ORDER — IPRATROPIUM BROMIDE AND ALBUTEROL SULFATE 2.5; .5 MG/3ML; MG/3ML
1 SOLUTION RESPIRATORY (INHALATION) ONCE
Status: COMPLETED | OUTPATIENT
Start: 2025-01-30 | End: 2025-01-30

## 2025-01-30 RX ORDER — OSELTAMIVIR PHOSPHATE 75 MG/1
75 CAPSULE ORAL ONCE
Status: COMPLETED | OUTPATIENT
Start: 2025-01-30 | End: 2025-01-30

## 2025-01-30 RX ORDER — ONDANSETRON 2 MG/ML
4 INJECTION INTRAMUSCULAR; INTRAVENOUS ONCE
Status: COMPLETED | OUTPATIENT
Start: 2025-01-30 | End: 2025-01-30

## 2025-01-30 RX ORDER — ALBUTEROL SULFATE 90 UG/1
2 INHALANT RESPIRATORY (INHALATION) 4 TIMES DAILY PRN
Qty: 18 G | Refills: 1 | Status: SHIPPED | OUTPATIENT
Start: 2025-01-30

## 2025-01-30 RX ORDER — 0.9 % SODIUM CHLORIDE 0.9 %
1000 INTRAVENOUS SOLUTION INTRAVENOUS ONCE
Status: COMPLETED | OUTPATIENT
Start: 2025-01-30 | End: 2025-01-30

## 2025-01-30 RX ORDER — OSELTAMIVIR PHOSPHATE 75 MG/1
75 CAPSULE ORAL 2 TIMES DAILY
Qty: 9 CAPSULE | Refills: 0 | Status: ON HOLD | OUTPATIENT
Start: 2025-01-30 | End: 2025-02-02 | Stop reason: HOSPADM

## 2025-01-30 RX ORDER — GUAIFENESIN 600 MG/1
600 TABLET, EXTENDED RELEASE ORAL 2 TIMES DAILY PRN
Qty: 30 TABLET | Refills: 0 | Status: SHIPPED | OUTPATIENT
Start: 2025-01-30 | End: 2025-02-14

## 2025-01-30 RX ORDER — ACETAMINOPHEN 500 MG
1000 TABLET ORAL ONCE
Status: COMPLETED | OUTPATIENT
Start: 2025-01-30 | End: 2025-01-30

## 2025-01-30 RX ORDER — IPRATROPIUM BROMIDE AND ALBUTEROL SULFATE 2.5; .5 MG/3ML; MG/3ML
2 SOLUTION RESPIRATORY (INHALATION) ONCE
Status: COMPLETED | OUTPATIENT
Start: 2025-01-30 | End: 2025-01-30

## 2025-01-30 RX ADMIN — SODIUM CHLORIDE 1000 ML: 9 INJECTION, SOLUTION INTRAVENOUS at 07:32

## 2025-01-30 RX ADMIN — ACETAMINOPHEN 1000 MG: 500 TABLET ORAL at 07:27

## 2025-01-30 RX ADMIN — WATER 60 MG: 1 INJECTION INTRAMUSCULAR; INTRAVENOUS; SUBCUTANEOUS at 09:35

## 2025-01-30 RX ADMIN — OSELTAMIVIR PHOSPHATE 75 MG: 75 CAPSULE ORAL at 08:58

## 2025-01-30 RX ADMIN — IPRATROPIUM BROMIDE AND ALBUTEROL SULFATE 1 DOSE: 2.5; .5 SOLUTION RESPIRATORY (INHALATION) at 06:52

## 2025-01-30 RX ADMIN — ONDANSETRON 4 MG: 2 INJECTION, SOLUTION INTRAMUSCULAR; INTRAVENOUS at 07:33

## 2025-01-30 RX ADMIN — IPRATROPIUM BROMIDE AND ALBUTEROL SULFATE 2 DOSE: 2.5; .5 SOLUTION RESPIRATORY (INHALATION) at 08:32

## 2025-01-30 ASSESSMENT — PAIN DESCRIPTION - LOCATION
LOCATION: GENERALIZED
LOCATION: NECK;GENERALIZED

## 2025-01-30 ASSESSMENT — PAIN - FUNCTIONAL ASSESSMENT
PAIN_FUNCTIONAL_ASSESSMENT: 0-10
PAIN_FUNCTIONAL_ASSESSMENT: PREVENTS OR INTERFERES SOME ACTIVE ACTIVITIES AND ADLS

## 2025-01-30 ASSESSMENT — PAIN SCALES - GENERAL
PAINLEVEL_OUTOF10: 8
PAINLEVEL_OUTOF10: 9

## 2025-01-30 ASSESSMENT — PAIN DESCRIPTION - DESCRIPTORS
DESCRIPTORS: ACHING
DESCRIPTORS: ACHING

## 2025-01-30 NOTE — ED NOTES
Discharge instructions reviewed with patient. Reviewed medications with patient. No additional questions asked.  Voiced understanding. Encouraged patient to follow up as discussed by the ED physician. Reviewed how to access [employer] at discharged with patient. Encourage to sign up either on their smartphone or on the computer to be able to review the information form today's and future visits. Voiced understanding. No additional questions asked. Patient ambulatory without difficulty. Physician aware.

## 2025-01-30 NOTE — DISCHARGE INSTRUCTIONS
You were seen in the emergency department for respiratory symptoms.  Here, you tested positive for influenza.  Your oxygen levels were borderline.  We offered you admission, but you preferred discharge and outpatient management.    For that reason, I prescribed the medication oseltamivir, for you to take twice daily for 5 days for the management of influenza.  I also prescribed you a refill of the inhaler albuterol, that I recommend you to use every 4 hours while awake for the next 2 days, and then only as needed.  I prescribed guaifenesin, a medication for cough, which may help a little bit.    Please, if you get sicker come back promptly.    Follow-up with your primary care doctor next week.    For pain, you can take ibuprofen 600-800 milligrams every 8 hours, alternating it every 4 hours with acetaminophen 975 mg taken every 8 hours.  Therefore, if you take acetaminophen at noon, then take ibuprofen at 4 PM, then acetaminophen again at 8 PM, then ibuprofen at midnight, and so on.      If at some point you have severe shortness of breath, severe nausea or vomiting unable to keep anything down, feels severely weak unable to stand up, feels confused or are lethargic unable to do your normal daily activities, or have any other concerning symptoms, please come back promptly to the emergency department.

## 2025-01-30 NOTE — ED PROVIDER NOTES
In order to facilitate the oncoming physician I have seen this patient in triage.  I have placed basic orders and/or imaging for the oncoming physician.  In addition, I have placed medication orders in anticipation of what may be needed for this patient by the oncoming physician.  I have only seen this patient in triage to the oncoming physician will be providing the complete medical record and they will be the physician of record for the patient.  My goal has been to provide triage and any emergent needs that the patient may need prior to arrival of oncoming physician.  The patient was checked out to the oncoming physician they are stable and neurovascularly intact prior to my leaving the department.  In brief, 59 year old female who has had URI for a while has suddenly gotten worse. She arrived at ER with oxygensaturation of 84% on RA. I have ordered basic labs monitor oxygen and solumedrol with breathing treatment.   He oxygen saturation increased to 93% on 2 liters         Ton Hendricks, DO  01/30/25 0651    
No
SURGERY Right 2020    Diagnostic arthroscopy, right shoulder with rotator cuff repair.  2. subacromial decompression 3. distal clavicle resection 4. extensive debridement    TUBAL LIGATION       Family History   Problem Relation Age of Onset    Cancer Mother         Thyroid    Diabetes Mother     Hypertension Mother     Stroke Mother     Thyroid Disease Mother     Kidney Disease Mother     Glaucoma Mother     Thyroid Cancer Mother     Cancer Father     Heart Disease Father     Lung Cancer Father     Obesity Brother     Thyroid Disease Brother     Kidney Disease Brother      Social History     Socioeconomic History    Marital status: Single     Spouse name: Not on file    Number of children: Not on file    Years of education: Not on file    Highest education level: Not on file   Occupational History    Not on file   Tobacco Use    Smoking status: Every Day     Current packs/day: 0.00     Average packs/day: 0.5 packs/day for 45.6 years (22.8 ttl pk-yrs)     Types: Cigarettes     Start date:      Last attempt to quit: 2022     Years since quittin.5    Smokeless tobacco: Never    Tobacco comments:     Smokes 4 cigarettes daily   Vaping Use    Vaping status: Never Used   Substance and Sexual Activity    Alcohol use: No    Drug use: Not Currently     Types: Methamphetamines (Crystal Meth), Marijuana (Weed)     Comment: Not used since     Sexual activity: Yes     Partners: Male   Other Topics Concern    Not on file   Social History Narrative    Not on file     Social Determinants of Health     Financial Resource Strain: Medium Risk (2024)    Overall Financial Resource Strain (CARDIA)     Difficulty of Paying Living Expenses: Somewhat hard   Food Insecurity: No Food Insecurity (1/10/2025)    Hunger Vital Sign     Worried About Running Out of Food in the Last Year: Never true     Ran Out of Food in the Last Year: Never true   Transportation Needs: No Transportation Needs (1/10/2025)    ADRIANNA

## 2025-01-31 ENCOUNTER — HOSPITAL ENCOUNTER (OUTPATIENT)
Age: 60
Setting detail: OBSERVATION
Discharge: HOME | End: 2025-02-02
Attending: STUDENT IN AN ORGANIZED HEALTH CARE EDUCATION/TRAINING PROGRAM | Admitting: HOSPITALIST

## 2025-01-31 DIAGNOSIS — J10.1 INFLUENZA A: ICD-10-CM

## 2025-01-31 DIAGNOSIS — J44.1 COPD EXACERBATION (HCC): Primary | ICD-10-CM

## 2025-01-31 LAB
ALBUMIN SERPL-MCNC: 3.6 G/DL (ref 3.4–5)
ALBUMIN/GLOB SERPL: 1.2 {RATIO} (ref 1.1–2.2)
ALP SERPL-CCNC: 57 U/L (ref 40–129)
ALT SERPL-CCNC: 23 U/L (ref 10–40)
ANION GAP SERPL CALCULATED.3IONS-SCNC: 11 MMOL/L (ref 4–16)
AST SERPL-CCNC: 53 U/L (ref 15–37)
BASOPHILS # BLD: 0.01 K/UL
BASOPHILS NFR BLD: 0 % (ref 0–1)
BILIRUB SERPL-MCNC: 0.2 MG/DL (ref 0–1)
BUN SERPL-MCNC: 11 MG/DL (ref 6–23)
CALCIUM SERPL-MCNC: 8 MG/DL (ref 8.3–10.6)
CHLORIDE SERPL-SCNC: 98 MMOL/L (ref 99–110)
CO2 SERPL-SCNC: 23 MMOL/L (ref 21–32)
CREAT SERPL-MCNC: 0.7 MG/DL (ref 0.6–1.1)
EOSINOPHIL # BLD: 0.02 K/UL
EOSINOPHILS RELATIVE PERCENT: 0 % (ref 0–3)
ERYTHROCYTE [DISTWIDTH] IN BLOOD BY AUTOMATED COUNT: 12.6 % (ref 11.7–14.9)
GFR, ESTIMATED: >90 ML/MIN/1.73M2
GLUCOSE SERPL-MCNC: 93 MG/DL (ref 74–99)
HCT VFR BLD AUTO: 40.2 % (ref 37–47)
HGB BLD-MCNC: 13.7 G/DL (ref 12.5–16)
IMM GRANULOCYTES # BLD AUTO: 0.03 K/UL
IMM GRANULOCYTES NFR BLD: 0 %
LYMPHOCYTES NFR BLD: 0.69 K/UL
LYMPHOCYTES RELATIVE PERCENT: 8 % (ref 24–44)
MCH RBC QN AUTO: 29.4 PG (ref 27–31)
MCHC RBC AUTO-ENTMCNC: 34.1 G/DL (ref 32–36)
MCV RBC AUTO: 86.3 FL (ref 78–100)
MONOCYTES NFR BLD: 0.57 K/UL
MONOCYTES NFR BLD: 7 % (ref 0–4)
NEUTROPHILS NFR BLD: 85 % (ref 36–66)
NEUTS SEG NFR BLD: 7.23 K/UL
PLATELET # BLD AUTO: 135 K/UL (ref 140–440)
PMV BLD AUTO: 10.7 FL (ref 7.5–11.1)
POTASSIUM SERPL-SCNC: 4.2 MMOL/L (ref 3.5–5.1)
PROCALCITONIN SERPL-MCNC: 0.05 NG/ML
PROT SERPL-MCNC: 6.5 G/DL (ref 6.4–8.2)
RBC # BLD AUTO: 4.66 M/UL (ref 4.2–5.4)
SODIUM SERPL-SCNC: 132 MMOL/L (ref 135–145)
WBC OTHER # BLD: 8.6 K/UL (ref 4–10.5)

## 2025-01-31 PROCEDURE — 94761 N-INVAS EAR/PLS OXIMETRY MLT: CPT

## 2025-01-31 PROCEDURE — 96374 THER/PROPH/DIAG INJ IV PUSH: CPT

## 2025-01-31 PROCEDURE — 6370000000 HC RX 637 (ALT 250 FOR IP): Performed by: NURSE PRACTITIONER

## 2025-01-31 PROCEDURE — 94640 AIRWAY INHALATION TREATMENT: CPT

## 2025-01-31 PROCEDURE — 6360000002 HC RX W HCPCS: Performed by: STUDENT IN AN ORGANIZED HEALTH CARE EDUCATION/TRAINING PROGRAM

## 2025-01-31 PROCEDURE — 2700000000 HC OXYGEN THERAPY PER DAY

## 2025-01-31 PROCEDURE — 85025 COMPLETE CBC W/AUTO DIFF WBC: CPT

## 2025-01-31 PROCEDURE — 2500000003 HC RX 250 WO HCPCS: Performed by: NURSE PRACTITIONER

## 2025-01-31 PROCEDURE — 94664 DEMO&/EVAL PT USE INHALER: CPT

## 2025-01-31 PROCEDURE — 6370000000 HC RX 637 (ALT 250 FOR IP): Performed by: STUDENT IN AN ORGANIZED HEALTH CARE EDUCATION/TRAINING PROGRAM

## 2025-01-31 PROCEDURE — 99285 EMERGENCY DEPT VISIT HI MDM: CPT

## 2025-01-31 PROCEDURE — 2580000003 HC RX 258: Performed by: NURSE PRACTITIONER

## 2025-01-31 PROCEDURE — 96375 TX/PRO/DX INJ NEW DRUG ADDON: CPT

## 2025-01-31 PROCEDURE — 6360000002 HC RX W HCPCS: Performed by: NURSE PRACTITIONER

## 2025-01-31 PROCEDURE — G0378 HOSPITAL OBSERVATION PER HR: HCPCS

## 2025-01-31 PROCEDURE — 84145 PROCALCITONIN (PCT): CPT

## 2025-01-31 PROCEDURE — 80053 COMPREHEN METABOLIC PANEL: CPT

## 2025-01-31 RX ORDER — ACETAMINOPHEN 500 MG
1000 TABLET ORAL ONCE
Status: COMPLETED | OUTPATIENT
Start: 2025-01-31 | End: 2025-01-31

## 2025-01-31 RX ORDER — PREDNISONE 20 MG/1
40 TABLET ORAL DAILY
Status: DISCONTINUED | OUTPATIENT
Start: 2025-01-31 | End: 2025-02-02 | Stop reason: HOSPADM

## 2025-01-31 RX ORDER — ACETAMINOPHEN 325 MG/1
650 TABLET ORAL EVERY 6 HOURS PRN
Status: DISCONTINUED | OUTPATIENT
Start: 2025-01-31 | End: 2025-02-02 | Stop reason: HOSPADM

## 2025-01-31 RX ORDER — IPRATROPIUM BROMIDE AND ALBUTEROL SULFATE 2.5; .5 MG/3ML; MG/3ML
1 SOLUTION RESPIRATORY (INHALATION)
Status: DISCONTINUED | OUTPATIENT
Start: 2025-01-31 | End: 2025-02-02 | Stop reason: HOSPADM

## 2025-01-31 RX ORDER — ONDANSETRON 4 MG/1
4 TABLET, ORALLY DISINTEGRATING ORAL EVERY 8 HOURS PRN
Status: DISCONTINUED | OUTPATIENT
Start: 2025-01-31 | End: 2025-02-02 | Stop reason: HOSPADM

## 2025-01-31 RX ORDER — ACETYLCYSTEINE 100 MG/ML
4 SOLUTION ORAL; RESPIRATORY (INHALATION)
Status: DISCONTINUED | OUTPATIENT
Start: 2025-02-01 | End: 2025-02-02 | Stop reason: HOSPADM

## 2025-01-31 RX ORDER — OSELTAMIVIR PHOSPHATE 75 MG/1
75 CAPSULE ORAL ONCE
Status: COMPLETED | OUTPATIENT
Start: 2025-01-31 | End: 2025-01-31

## 2025-01-31 RX ORDER — POLYETHYLENE GLYCOL 3350 17 G/17G
17 POWDER, FOR SOLUTION ORAL DAILY PRN
Status: DISCONTINUED | OUTPATIENT
Start: 2025-01-31 | End: 2025-02-02 | Stop reason: HOSPADM

## 2025-01-31 RX ORDER — AZITHROMYCIN 250 MG/1
500 TABLET, FILM COATED ORAL DAILY
Status: DISCONTINUED | OUTPATIENT
Start: 2025-01-31 | End: 2025-02-02 | Stop reason: HOSPADM

## 2025-01-31 RX ORDER — ACETAMINOPHEN 650 MG/1
650 SUPPOSITORY RECTAL EVERY 6 HOURS PRN
Status: DISCONTINUED | OUTPATIENT
Start: 2025-01-31 | End: 2025-02-02 | Stop reason: HOSPADM

## 2025-01-31 RX ORDER — OSELTAMIVIR PHOSPHATE 75 MG/1
75 CAPSULE ORAL 2 TIMES DAILY
Status: DISCONTINUED | OUTPATIENT
Start: 2025-01-31 | End: 2025-02-02 | Stop reason: HOSPADM

## 2025-01-31 RX ORDER — LEVOTHYROXINE SODIUM 100 UG/1
100 TABLET ORAL DAILY
Status: DISCONTINUED | OUTPATIENT
Start: 2025-02-01 | End: 2025-02-02 | Stop reason: HOSPADM

## 2025-01-31 RX ORDER — KETOROLAC TROMETHAMINE 15 MG/ML
15 INJECTION, SOLUTION INTRAMUSCULAR; INTRAVENOUS ONCE
Status: COMPLETED | OUTPATIENT
Start: 2025-01-31 | End: 2025-01-31

## 2025-01-31 RX ORDER — GUAIFENESIN 600 MG/1
600 TABLET, EXTENDED RELEASE ORAL 2 TIMES DAILY PRN
Status: DISCONTINUED | OUTPATIENT
Start: 2025-01-31 | End: 2025-02-02 | Stop reason: HOSPADM

## 2025-01-31 RX ORDER — ACETYLCYSTEINE 100 MG/ML
4 SOLUTION ORAL; RESPIRATORY (INHALATION) EVERY 4 HOURS
Status: DISCONTINUED | OUTPATIENT
Start: 2025-01-31 | End: 2025-01-31

## 2025-01-31 RX ORDER — ONDANSETRON 2 MG/ML
4 INJECTION INTRAMUSCULAR; INTRAVENOUS ONCE
Status: COMPLETED | OUTPATIENT
Start: 2025-01-31 | End: 2025-01-31

## 2025-01-31 RX ORDER — LABETALOL HYDROCHLORIDE 5 MG/ML
5 INJECTION, SOLUTION INTRAVENOUS EVERY 4 HOURS PRN
Status: DISCONTINUED | OUTPATIENT
Start: 2025-01-31 | End: 2025-02-02 | Stop reason: HOSPADM

## 2025-01-31 RX ORDER — GUAIFENESIN 600 MG/1
600 TABLET, EXTENDED RELEASE ORAL 2 TIMES DAILY
Status: DISCONTINUED | OUTPATIENT
Start: 2025-01-31 | End: 2025-02-02 | Stop reason: HOSPADM

## 2025-01-31 RX ORDER — CETIRIZINE HYDROCHLORIDE 10 MG/1
10 TABLET ORAL DAILY
Status: DISCONTINUED | OUTPATIENT
Start: 2025-01-31 | End: 2025-02-02 | Stop reason: HOSPADM

## 2025-01-31 RX ORDER — IPRATROPIUM BROMIDE AND ALBUTEROL SULFATE 2.5; .5 MG/3ML; MG/3ML
3 SOLUTION RESPIRATORY (INHALATION) ONCE
Status: COMPLETED | OUTPATIENT
Start: 2025-01-31 | End: 2025-01-31

## 2025-01-31 RX ORDER — SODIUM CHLORIDE 0.9 % (FLUSH) 0.9 %
5-40 SYRINGE (ML) INJECTION EVERY 12 HOURS SCHEDULED
Status: DISCONTINUED | OUTPATIENT
Start: 2025-01-31 | End: 2025-02-02 | Stop reason: HOSPADM

## 2025-01-31 RX ORDER — SODIUM CHLORIDE 0.9 % (FLUSH) 0.9 %
5-40 SYRINGE (ML) INJECTION PRN
Status: DISCONTINUED | OUTPATIENT
Start: 2025-01-31 | End: 2025-02-02 | Stop reason: HOSPADM

## 2025-01-31 RX ORDER — BENZONATATE 100 MG/1
200 CAPSULE ORAL 3 TIMES DAILY PRN
Status: DISCONTINUED | OUTPATIENT
Start: 2025-01-31 | End: 2025-02-02 | Stop reason: HOSPADM

## 2025-01-31 RX ORDER — ENOXAPARIN SODIUM 100 MG/ML
40 INJECTION SUBCUTANEOUS DAILY
Status: DISCONTINUED | OUTPATIENT
Start: 2025-01-31 | End: 2025-02-02 | Stop reason: HOSPADM

## 2025-01-31 RX ORDER — SODIUM CHLORIDE 9 MG/ML
INJECTION, SOLUTION INTRAVENOUS PRN
Status: DISCONTINUED | OUTPATIENT
Start: 2025-01-31 | End: 2025-02-02 | Stop reason: HOSPADM

## 2025-01-31 RX ORDER — SODIUM CHLORIDE 9 MG/ML
INJECTION, SOLUTION INTRAVENOUS CONTINUOUS
Status: DISPENSED | OUTPATIENT
Start: 2025-01-31 | End: 2025-02-01

## 2025-01-31 RX ORDER — ONDANSETRON 2 MG/ML
4 INJECTION INTRAMUSCULAR; INTRAVENOUS EVERY 6 HOURS PRN
Status: DISCONTINUED | OUTPATIENT
Start: 2025-01-31 | End: 2025-02-02 | Stop reason: HOSPADM

## 2025-01-31 RX ADMIN — SODIUM CHLORIDE: 9 INJECTION, SOLUTION INTRAVENOUS at 21:50

## 2025-01-31 RX ADMIN — BENZONATATE 200 MG: 100 CAPSULE ORAL at 21:37

## 2025-01-31 RX ADMIN — OSELTAMIVIR PHOSPHATE 75 MG: 75 CAPSULE ORAL at 16:48

## 2025-01-31 RX ADMIN — CETIRIZINE HYDROCHLORIDE 10 MG: 10 TABLET, FILM COATED ORAL at 16:48

## 2025-01-31 RX ADMIN — IPRATROPIUM BROMIDE AND ALBUTEROL SULFATE 1 DOSE: 2.5; .5 SOLUTION RESPIRATORY (INHALATION) at 20:42

## 2025-01-31 RX ADMIN — ONDANSETRON 4 MG: 2 INJECTION, SOLUTION INTRAMUSCULAR; INTRAVENOUS at 16:48

## 2025-01-31 RX ADMIN — PREDNISONE 40 MG: 20 TABLET ORAL at 21:36

## 2025-01-31 RX ADMIN — GUAIFENESIN 600 MG: 600 TABLET, EXTENDED RELEASE ORAL at 21:36

## 2025-01-31 RX ADMIN — ACETAMINOPHEN 650 MG: 325 TABLET ORAL at 21:47

## 2025-01-31 RX ADMIN — SODIUM CHLORIDE, PRESERVATIVE FREE 10 ML: 5 INJECTION INTRAVENOUS at 21:37

## 2025-01-31 RX ADMIN — ACETAMINOPHEN 1000 MG: 500 TABLET ORAL at 16:48

## 2025-01-31 RX ADMIN — AZITHROMYCIN DIHYDRATE 500 MG: 250 TABLET ORAL at 21:36

## 2025-01-31 RX ADMIN — ACETYLCYSTEINE 400 MG: 100 SOLUTION ORAL; RESPIRATORY (INHALATION) at 20:41

## 2025-01-31 RX ADMIN — KETOROLAC TROMETHAMINE 15 MG: 15 INJECTION, SOLUTION INTRAMUSCULAR; INTRAVENOUS at 16:49

## 2025-01-31 RX ADMIN — IPRATROPIUM BROMIDE AND ALBUTEROL SULFATE 3 DOSE: .5; 3 SOLUTION RESPIRATORY (INHALATION) at 16:32

## 2025-01-31 ASSESSMENT — LIFESTYLE VARIABLES
HOW OFTEN DO YOU HAVE A DRINK CONTAINING ALCOHOL: MONTHLY OR LESS
HOW MANY STANDARD DRINKS CONTAINING ALCOHOL DO YOU HAVE ON A TYPICAL DAY: PATIENT DOES NOT DRINK

## 2025-01-31 ASSESSMENT — PAIN SCALES - GENERAL
PAINLEVEL_OUTOF10: 8
PAINLEVEL_OUTOF10: 3
PAINLEVEL_OUTOF10: 8
PAINLEVEL_OUTOF10: 8

## 2025-01-31 ASSESSMENT — PAIN DESCRIPTION - LOCATION
LOCATION: ABDOMEN;CHEST
LOCATION: CHEST

## 2025-01-31 NOTE — ED PROVIDER NOTES
Emergency Department Encounter    Patient: Mindy Hurtado  MRN: [id number]  : 1965  Date of Evaluation: 2025  ED Provider:  Zach Peter MD    Triage Chief Complaint:   Shortness of Breath (Patient was dx with Flu A and was hypoxic. States sats are still low and is feeling worse. States still feels like she cannot catch her breath. )    [place]:  Mindy Hurtado is a 59 y.o. female with history significant for COPD, hypertension, hyperlipidemia, hypothyroidism, recently diagnosed with influenza A, that presents for shortness of breath.  The patient has had 3 days of progressively worsening shortness of breath, something that is constant, not improved by anything, has been progressively worsening, is described as severe, and is associated with cough, that has become severe, is almost constant, productive of clear sputum, and associated with mild chest discomfort that occurs when she coughs.  No quantified fevers or chills.  No lower extremity edema, tenderness in calves or rashes.  No nausea or other GI symptoms.  No urinary abnormalities.    ROS - see HPI, below listed is current ROS at time of my eval:  Systems reviewed and negative except as above.     Past Medical History:   Diagnosis Date    COPD (chronic obstructive pulmonary disease) (HCC)     Denies COPD, uses inhaler for SOB - prn - hx: smoking    H/O echocardiogram 2018    EF 55-60%. No significant valvular disease.     H/O echocardiogram 2024    EF of 55 - 60%. Mild septal thickening Aortic Valve: Mild sclerosis of the aortic valve, noncoronary cusp. Interatrial Septum: No interatrial shunt visualized with color Doppler. The septum is mildly bowing into the RA. Tricuspid Valve: Normal RVSP.    H/O exercise stress test 2024    This is a nondiagnostic treadmill stress test as target heart rate is not achieved.    Hernia, umbilical     History of cardiac monitoring 2018    Conclusion: 30 days event monitor

## 2025-01-31 NOTE — H&P
V2.0  History and Physical      Name:  Mindy Hurtado /Age/Sex: 1965  (59 y.o. female)   MRN & CSN:  [id number] & [id number] Encounter Date/Time: 2025 6:26 PM EST   Location:   PCP: Justa Nolan FNP       Hospital Day: 1    Assessment and Plan:   Mindy Hurtado is a 59 y.o. female with a pmh  as listed below who presents with Influenza A    Hospital Problems             Last Modified POA    * (Principal) Influenza A 2025 Yes       Plan:  Acute respiratory failure with hypoxia most likely secondary to influenza A and COPD exacerbation.  Patient is already on Tamiflu as she was seen in the ER yesterday.  Will start Zithromax p.o. as she does have a productive cough, steroids, DuoNebs and Mucomyst.  Tessalon Perles and Mucinex.  COPD exacerbation, treatment as above patient is requiring 2 L nasal cannula will wean as tolerated.  She tells me she quit smoking 4 days ago.  She states this was before she got sick.  Hypertension, blood pressure in the emergency department was 159/100, she does not appear to be on any home medications for hypertension.  I will start her on lisinopril 10 mg daily.  Will order labetalol IV as needed.  Hyperlipidemia, not taking any medications states she is allergic to statins  Hypothyroidism continue Synthroid.  Medication noncompliance, I do not believe patient is taking any of her home medications based on her fill history.    Disposition:   Current Living situation: Home  Expected Disposition: Home  Estimated D/C: 24 to 48-hour    Diet ADULT DIET; Regular; Low Fat/Low Chol/High Fiber/HARRISON   DVT Prophylaxis [x] Lovenox, []  Heparin, [] SCDs, [] Ambulation,  [] Eliquis, [] Xarelto, [] Coumadin   Code Status Full Code   Surrogate Decision Maker/ POA Cliff Desir     Personally reviewed Lab Studies and Imaging     Discussed management of the case with Dr. Ross my supervising physician who is in agreement with the above-noted plan of care.      EKG interpreted

## 2025-01-31 NOTE — ED TRIAGE NOTES
Arrived ambulatory to room 6 for triage. Tolerated without difficulty. Bed in lowest position. Call light given. Gowned for exam.

## 2025-02-01 LAB
ANION GAP SERPL CALCULATED.3IONS-SCNC: 9 MMOL/L (ref 4–16)
BASOPHILS # BLD: 0.02 K/UL
BASOPHILS NFR BLD: 0 % (ref 0–1)
BUN SERPL-MCNC: 10 MG/DL (ref 6–23)
CALCIUM SERPL-MCNC: 7.6 MG/DL (ref 8.3–10.6)
CHLORIDE SERPL-SCNC: 99 MMOL/L (ref 99–110)
CO2 SERPL-SCNC: 26 MMOL/L (ref 21–32)
CREAT SERPL-MCNC: 0.8 MG/DL (ref 0.6–1.1)
EOSINOPHIL # BLD: 0.01 K/UL
EOSINOPHILS RELATIVE PERCENT: 0 % (ref 0–3)
ERYTHROCYTE [DISTWIDTH] IN BLOOD BY AUTOMATED COUNT: 12.7 % (ref 11.7–14.9)
GFR, ESTIMATED: 85 ML/MIN/1.73M2
GLUCOSE SERPL-MCNC: 102 MG/DL (ref 74–99)
HCT VFR BLD AUTO: 41.6 % (ref 37–47)
HGB BLD-MCNC: 14.1 G/DL (ref 12.5–16)
IMM GRANULOCYTES # BLD AUTO: 0.04 K/UL
IMM GRANULOCYTES NFR BLD: 0 %
LYMPHOCYTES NFR BLD: 0.47 K/UL
LYMPHOCYTES RELATIVE PERCENT: 5 % (ref 24–44)
MCH RBC QN AUTO: 29.6 PG (ref 27–31)
MCHC RBC AUTO-ENTMCNC: 33.9 G/DL (ref 32–36)
MCV RBC AUTO: 87.4 FL (ref 78–100)
MONOCYTES NFR BLD: 0.29 K/UL
MONOCYTES NFR BLD: 3 % (ref 0–4)
NEUTROPHILS NFR BLD: 92 % (ref 36–66)
NEUTS SEG NFR BLD: 9.39 K/UL
PHOSPHATE SERPL-MCNC: 3.1 MG/DL (ref 2.5–4.9)
PLATELET # BLD AUTO: 140 K/UL (ref 140–440)
PLATELET CONFIRMATION: NORMAL
PMV BLD AUTO: 11 FL (ref 7.5–11.1)
POTASSIUM SERPL-SCNC: 3.8 MMOL/L (ref 3.5–5.1)
RBC # BLD AUTO: 4.76 M/UL (ref 4.2–5.4)
SODIUM SERPL-SCNC: 134 MMOL/L (ref 135–145)
T4 FREE SERPL-MCNC: 0.3 NG/DL (ref 0.9–1.8)
TSH SERPL DL<=0.05 MIU/L-ACNC: 4.01 UIU/ML (ref 0.27–4.2)
WBC OTHER # BLD: 10.2 K/UL (ref 4–10.5)

## 2025-02-01 PROCEDURE — 84443 ASSAY THYROID STIM HORMONE: CPT

## 2025-02-01 PROCEDURE — 2500000003 HC RX 250 WO HCPCS: Performed by: NURSE PRACTITIONER

## 2025-02-01 PROCEDURE — G0378 HOSPITAL OBSERVATION PER HR: HCPCS

## 2025-02-01 PROCEDURE — 96372 THER/PROPH/DIAG INJ SC/IM: CPT

## 2025-02-01 PROCEDURE — 6370000000 HC RX 637 (ALT 250 FOR IP): Performed by: NURSE PRACTITIONER

## 2025-02-01 PROCEDURE — 84100 ASSAY OF PHOSPHORUS: CPT

## 2025-02-01 PROCEDURE — 6360000002 HC RX W HCPCS: Performed by: NURSE PRACTITIONER

## 2025-02-01 PROCEDURE — 94640 AIRWAY INHALATION TREATMENT: CPT

## 2025-02-01 PROCEDURE — 87449 NOS EACH ORGANISM AG IA: CPT

## 2025-02-01 PROCEDURE — 87899 AGENT NOS ASSAY W/OPTIC: CPT

## 2025-02-01 PROCEDURE — 6360000002 HC RX W HCPCS: Performed by: HOSPITALIST

## 2025-02-01 PROCEDURE — 2580000003 HC RX 258: Performed by: NURSE PRACTITIONER

## 2025-02-01 PROCEDURE — 84439 ASSAY OF FREE THYROXINE: CPT

## 2025-02-01 PROCEDURE — 94761 N-INVAS EAR/PLS OXIMETRY MLT: CPT

## 2025-02-01 PROCEDURE — 36415 COLL VENOUS BLD VENIPUNCTURE: CPT

## 2025-02-01 PROCEDURE — 2700000000 HC OXYGEN THERAPY PER DAY

## 2025-02-01 PROCEDURE — 80048 BASIC METABOLIC PNL TOTAL CA: CPT

## 2025-02-01 PROCEDURE — 85025 COMPLETE CBC W/AUTO DIFF WBC: CPT

## 2025-02-01 RX ORDER — METHOCARBAMOL 750 MG/1
750 TABLET, FILM COATED ORAL 4 TIMES DAILY
Status: DISCONTINUED | OUTPATIENT
Start: 2025-02-01 | End: 2025-02-02 | Stop reason: HOSPADM

## 2025-02-01 RX ORDER — METHOCARBAMOL 750 MG/1
750 TABLET, FILM COATED ORAL 4 TIMES DAILY
Status: DISCONTINUED | OUTPATIENT
Start: 2025-02-01 | End: 2025-02-01

## 2025-02-01 RX ADMIN — AZITHROMYCIN DIHYDRATE 500 MG: 250 TABLET ORAL at 17:46

## 2025-02-01 RX ADMIN — OSELTAMIVIR PHOSPHATE 75 MG: 75 CAPSULE ORAL at 08:53

## 2025-02-01 RX ADMIN — SODIUM CHLORIDE, PRESERVATIVE FREE 10 ML: 5 INJECTION INTRAVENOUS at 22:59

## 2025-02-01 RX ADMIN — METHOCARBAMOL TABLETS 750 MG: 750 TABLET, COATED ORAL at 17:45

## 2025-02-01 RX ADMIN — GUAIFENESIN 600 MG: 600 TABLET, EXTENDED RELEASE ORAL at 08:53

## 2025-02-01 RX ADMIN — ENOXAPARIN SODIUM 40 MG: 100 INJECTION SUBCUTANEOUS at 08:53

## 2025-02-01 RX ADMIN — METHOCARBAMOL TABLETS 750 MG: 750 TABLET, COATED ORAL at 10:06

## 2025-02-01 RX ADMIN — ACETYLCYSTEINE 400 MG: 100 SOLUTION ORAL; RESPIRATORY (INHALATION) at 19:48

## 2025-02-01 RX ADMIN — ACETYLCYSTEINE 400 MG: 100 SOLUTION ORAL; RESPIRATORY (INHALATION) at 11:36

## 2025-02-01 RX ADMIN — OSELTAMIVIR PHOSPHATE 75 MG: 75 CAPSULE ORAL at 22:56

## 2025-02-01 RX ADMIN — IPRATROPIUM BROMIDE AND ALBUTEROL SULFATE 1 DOSE: 2.5; .5 SOLUTION RESPIRATORY (INHALATION) at 11:36

## 2025-02-01 RX ADMIN — CETIRIZINE HYDROCHLORIDE 10 MG: 10 TABLET, FILM COATED ORAL at 08:53

## 2025-02-01 RX ADMIN — SODIUM CHLORIDE: 9 INJECTION, SOLUTION INTRAVENOUS at 08:59

## 2025-02-01 RX ADMIN — GUAIFENESIN 600 MG: 600 TABLET, EXTENDED RELEASE ORAL at 22:56

## 2025-02-01 RX ADMIN — IPRATROPIUM BROMIDE AND ALBUTEROL SULFATE 1 DOSE: 2.5; .5 SOLUTION RESPIRATORY (INHALATION) at 19:48

## 2025-02-01 RX ADMIN — LEVOTHYROXINE SODIUM 100 MCG: 0.1 TABLET ORAL at 06:43

## 2025-02-01 RX ADMIN — ACETYLCYSTEINE 400 MG: 100 SOLUTION ORAL; RESPIRATORY (INHALATION) at 15:37

## 2025-02-01 RX ADMIN — PREDNISONE 40 MG: 20 TABLET ORAL at 08:52

## 2025-02-01 RX ADMIN — IPRATROPIUM BROMIDE AND ALBUTEROL SULFATE 1 DOSE: 2.5; .5 SOLUTION RESPIRATORY (INHALATION) at 07:55

## 2025-02-01 RX ADMIN — METHOCARBAMOL TABLETS 750 MG: 750 TABLET, COATED ORAL at 22:56

## 2025-02-01 RX ADMIN — IPRATROPIUM BROMIDE AND ALBUTEROL SULFATE 1 DOSE: 2.5; .5 SOLUTION RESPIRATORY (INHALATION) at 15:37

## 2025-02-01 RX ADMIN — ACETYLCYSTEINE 400 MG: 100 SOLUTION ORAL; RESPIRATORY (INHALATION) at 07:56

## 2025-02-01 RX ADMIN — ACETAMINOPHEN 650 MG: 325 TABLET ORAL at 22:56

## 2025-02-01 RX ADMIN — BENZONATATE 200 MG: 100 CAPSULE ORAL at 23:06

## 2025-02-01 ASSESSMENT — PAIN DESCRIPTION - DESCRIPTORS
DESCRIPTORS: STABBING
DESCRIPTORS: SHARP
DESCRIPTORS: DULL

## 2025-02-01 ASSESSMENT — PAIN DESCRIPTION - PAIN TYPE: TYPE: ACUTE PAIN

## 2025-02-01 ASSESSMENT — PAIN DESCRIPTION - LOCATION
LOCATION: CHEST;RIB CAGE;OTHER (COMMENT)
LOCATION: RIB CAGE
LOCATION: RIB CAGE

## 2025-02-01 ASSESSMENT — PAIN DESCRIPTION - ORIENTATION
ORIENTATION: RIGHT;LEFT;MID
ORIENTATION: LEFT;RIGHT
ORIENTATION: RIGHT;LEFT

## 2025-02-01 ASSESSMENT — PAIN SCALES - GENERAL
PAINLEVEL_OUTOF10: 7
PAINLEVEL_OUTOF10: 4
PAINLEVEL_OUTOF10: 7
PAINLEVEL_OUTOF10: 6

## 2025-02-01 ASSESSMENT — PAIN DESCRIPTION - ONSET: ONSET: OTHER (COMMENT)

## 2025-02-01 ASSESSMENT — PAIN DESCRIPTION - FREQUENCY: FREQUENCY: INTERMITTENT

## 2025-02-01 ASSESSMENT — PAIN - FUNCTIONAL ASSESSMENT
PAIN_FUNCTIONAL_ASSESSMENT: ACTIVITIES ARE NOT PREVENTED
PAIN_FUNCTIONAL_ASSESSMENT: ACTIVITIES ARE NOT PREVENTED

## 2025-02-01 NOTE — PROGRESS NOTES
V2.0    [facility] Progress Note      Name:  Mindy Hurtado /Age/Sex: 1965  (59 y.o. female)   MRN & CSN:  [id number] & [id number] Encounter Date/Time: 2025 3:54 PM EST   Location:   PCP: Justa Nolan FNP     Attending:Hubert Ross MD       Hospital Day: 2    Assessment and Recommendations   Mindy Hurtado is a 59 y.o. female  who presents with Influenza A      Plan:     Acute respiratory failure with hypoxia most likely secondary to influenza A and COPD exacerbation.  Continue Tamiflu continue p.o. azithromycin, steroids, DuoNebs and Mucomyst.  Complained of side pain from cough will order Robaxin muscle relaxer.  COPD exacerbation, treatment as above patient is requiring 1 L nasal cannula will continue to wean as tolerated.  She tells me she quit smoking 4 days ago.  She states this was before she got sick.  Hypertension, improved with the addition of lisinopril today's blood pressure is 144/80 and 125/70  Hyperlipidemia, not taking any medications states she is allergic to statins  Hypothyroidism continue Synthroid.  Medication noncompliance, I do not believe patient is taking any of her home medications based on her fill history.    Diet ADULT DIET; Regular; Low Fat/Low Chol/High Fiber/HARRISON  ADULT ORAL NUTRITION SUPPLEMENT; Breakfast, Lunch, Dinner; Standard 4 oz Oral Supplement   DVT Prophylaxis [x] Lovenox, []  Heparin, [] SCDs, [] Ambulation,  [] Eliquis, [] Xarelto  [] Coumadin   Code Status Full Code   Disposition From: Home  Expected Disposition: Home  Estimated Date of Discharge: 24 hours  Patient requires continued admission due to still requiring oxygen   Surrogate Decision Maker/ POABDULLAHI Desir     Personally reviewed Lab Studies and Imaging     Discussed management of the case with Dr. Ross my supervising physician who is in agreement with the above-noted plan of care.          Drugs that require monitoring for toxicity include Lovenox and the method of monitoring was

## 2025-02-01 NOTE — PROGRESS NOTES
4 Eyes Skin Assessment     NAME:  Mindy Hurtado  YOB: 1965  MEDICAL RECORD NUMBER:  [id number]    The patient is being assessed for  Admission    I agree that at least one RN has performed a thorough Head to Toe Skin Assessment on the patient. ALL assessment sites listed below have been assessed.      Areas assessed by both nurses:    Head, Face, Ears, Shoulders, Back, Chest, Arms, Elbows, Hands, Sacrum. Buttock, Coccyx, Ischium, Legs. Feet and Heels, and Under Medical Devices         Does the Patient have a Wound? No noted wound(s)       Emil Prevention initiated by RN: No  Wound Care Orders initiated by RN: No    Pressure Injury (Stage 3,4, Unstageable, DTI, NWPT, and Complex wounds) if present, place Wound referral order by RN under : No    New Ostomies, if present place, Ostomy referral order under : No     Nurse 1 eSignature: Electronically signed by Lucia Steele RN on 1/31/25 at 9:35 PM EST    **SHARE this note so that the co-signing nurse can place an eSignature**    Nurse 2 eSignature: Electronically signed by Louann Waggoner RN on 1/31/25 at 9:59 PM EST

## 2025-02-02 VITALS
HEART RATE: 92 BPM | BODY MASS INDEX: 26.16 KG/M2 | DIASTOLIC BLOOD PRESSURE: 72 MMHG | HEIGHT: 66 IN | OXYGEN SATURATION: 94 % | WEIGHT: 162.8 LBS | SYSTOLIC BLOOD PRESSURE: 116 MMHG | RESPIRATION RATE: 18 BRPM | TEMPERATURE: 98.6 F

## 2025-02-02 LAB
ALBUMIN SERPL-MCNC: 3.2 G/DL (ref 3.4–5)
ALBUMIN/GLOB SERPL: 1 {RATIO} (ref 1.1–2.2)
ALP SERPL-CCNC: 47 U/L (ref 40–129)
ALT SERPL-CCNC: 25 U/L (ref 10–40)
ANION GAP SERPL CALCULATED.3IONS-SCNC: 9 MMOL/L (ref 4–16)
AST SERPL-CCNC: 67 U/L (ref 15–37)
BASOPHILS # BLD: 0.01 K/UL
BASOPHILS NFR BLD: 0 % (ref 0–1)
BILIRUB SERPL-MCNC: 0.3 MG/DL (ref 0–1)
BUN SERPL-MCNC: 8 MG/DL (ref 6–23)
CALCIUM SERPL-MCNC: 8.5 MG/DL (ref 8.3–10.6)
CHLORIDE SERPL-SCNC: 101 MMOL/L (ref 99–110)
CO2 SERPL-SCNC: 29 MMOL/L (ref 21–32)
CREAT SERPL-MCNC: 0.7 MG/DL (ref 0.6–1.1)
CRP SERPL HS-MCNC: 143 MG/L (ref 0–5)
EOSINOPHIL # BLD: 0 K/UL
EOSINOPHILS RELATIVE PERCENT: 0 % (ref 0–3)
ERYTHROCYTE [DISTWIDTH] IN BLOOD BY AUTOMATED COUNT: 12.9 % (ref 11.7–14.9)
GFR, ESTIMATED: >90 ML/MIN/1.73M2
GLUCOSE SERPL-MCNC: 76 MG/DL (ref 74–99)
HCT VFR BLD AUTO: 46.2 % (ref 37–47)
HGB BLD-MCNC: 15.2 G/DL (ref 12.5–16)
IMM GRANULOCYTES # BLD AUTO: 0.02 K/UL
IMM GRANULOCYTES NFR BLD: 0 %
L PNEUMO1 AG UR QL IA.RAPID: NEGATIVE
LYMPHOCYTES NFR BLD: 1.23 K/UL
LYMPHOCYTES RELATIVE PERCENT: 16 % (ref 24–44)
MCH RBC QN AUTO: 29.2 PG (ref 27–31)
MCHC RBC AUTO-ENTMCNC: 32.9 G/DL (ref 32–36)
MCV RBC AUTO: 88.7 FL (ref 78–100)
MONOCYTES NFR BLD: 0.23 K/UL
MONOCYTES NFR BLD: 3 % (ref 0–4)
NEUTROPHILS NFR BLD: 81 % (ref 36–66)
NEUTS SEG NFR BLD: 6.46 K/UL
PLATELET # BLD AUTO: 129 K/UL (ref 140–440)
PMV BLD AUTO: 11.1 FL (ref 7.5–11.1)
POTASSIUM SERPL-SCNC: 4.2 MMOL/L (ref 3.5–5.1)
PROCALCITONIN SERPL-MCNC: 1.2 NG/ML
PROT SERPL-MCNC: 6.4 G/DL (ref 6.4–8.2)
RBC # BLD AUTO: 5.21 M/UL (ref 4.2–5.4)
S PNEUM AG SPEC QL: POSITIVE
SODIUM SERPL-SCNC: 139 MMOL/L (ref 135–145)
WBC OTHER # BLD: 8 K/UL (ref 4–10.5)

## 2025-02-02 PROCEDURE — 94640 AIRWAY INHALATION TREATMENT: CPT

## 2025-02-02 PROCEDURE — 87205 SMEAR GRAM STAIN: CPT

## 2025-02-02 PROCEDURE — 36415 COLL VENOUS BLD VENIPUNCTURE: CPT

## 2025-02-02 PROCEDURE — 80053 COMPREHEN METABOLIC PANEL: CPT

## 2025-02-02 PROCEDURE — 6360000002 HC RX W HCPCS: Performed by: NURSE PRACTITIONER

## 2025-02-02 PROCEDURE — 87070 CULTURE OTHR SPECIMN AEROBIC: CPT

## 2025-02-02 PROCEDURE — 86140 C-REACTIVE PROTEIN: CPT

## 2025-02-02 PROCEDURE — 84145 PROCALCITONIN (PCT): CPT

## 2025-02-02 PROCEDURE — 6370000000 HC RX 637 (ALT 250 FOR IP): Performed by: NURSE PRACTITIONER

## 2025-02-02 PROCEDURE — G0378 HOSPITAL OBSERVATION PER HR: HCPCS

## 2025-02-02 PROCEDURE — 96372 THER/PROPH/DIAG INJ SC/IM: CPT

## 2025-02-02 PROCEDURE — 2500000003 HC RX 250 WO HCPCS: Performed by: NURSE PRACTITIONER

## 2025-02-02 PROCEDURE — 85025 COMPLETE CBC W/AUTO DIFF WBC: CPT

## 2025-02-02 PROCEDURE — 2700000000 HC OXYGEN THERAPY PER DAY

## 2025-02-02 RX ORDER — IPRATROPIUM BROMIDE AND ALBUTEROL SULFATE 2.5; .5 MG/3ML; MG/3ML
1 SOLUTION RESPIRATORY (INHALATION) EVERY 4 HOURS
Qty: 360 ML | Refills: 0 | Status: SHIPPED | OUTPATIENT
Start: 2025-02-02

## 2025-02-02 RX ORDER — PREDNISONE 20 MG/1
40 TABLET ORAL DAILY
Qty: 4 TABLET | Refills: 0 | Status: SHIPPED | OUTPATIENT
Start: 2025-02-03 | End: 2025-02-05

## 2025-02-02 RX ORDER — LEVOFLOXACIN 750 MG/1
750 TABLET, FILM COATED ORAL DAILY
Qty: 5 TABLET | Refills: 0 | Status: SHIPPED | OUTPATIENT
Start: 2025-02-02 | End: 2025-02-07

## 2025-02-02 RX ORDER — OSELTAMIVIR PHOSPHATE 75 MG/1
75 CAPSULE ORAL 2 TIMES DAILY
Qty: 4 CAPSULE | Refills: 0 | Status: SHIPPED | OUTPATIENT
Start: 2025-02-02 | End: 2025-02-04

## 2025-02-02 RX ORDER — BENZONATATE 200 MG/1
200 CAPSULE ORAL 3 TIMES DAILY PRN
Qty: 21 CAPSULE | Refills: 0 | Status: SHIPPED | OUTPATIENT
Start: 2025-02-02 | End: 2025-02-09

## 2025-02-02 RX ADMIN — SODIUM CHLORIDE, PRESERVATIVE FREE 10 ML: 5 INJECTION INTRAVENOUS at 08:27

## 2025-02-02 RX ADMIN — PREDNISONE 40 MG: 20 TABLET ORAL at 08:26

## 2025-02-02 RX ADMIN — GUAIFENESIN 600 MG: 600 TABLET, EXTENDED RELEASE ORAL at 08:26

## 2025-02-02 RX ADMIN — METHOCARBAMOL TABLETS 750 MG: 750 TABLET, COATED ORAL at 08:26

## 2025-02-02 RX ADMIN — CETIRIZINE HYDROCHLORIDE 10 MG: 10 TABLET, FILM COATED ORAL at 08:26

## 2025-02-02 RX ADMIN — ENOXAPARIN SODIUM 40 MG: 100 INJECTION SUBCUTANEOUS at 08:27

## 2025-02-02 RX ADMIN — IPRATROPIUM BROMIDE AND ALBUTEROL SULFATE 1 DOSE: 2.5; .5 SOLUTION RESPIRATORY (INHALATION) at 07:54

## 2025-02-02 RX ADMIN — OSELTAMIVIR PHOSPHATE 75 MG: 75 CAPSULE ORAL at 08:26

## 2025-02-02 RX ADMIN — IPRATROPIUM BROMIDE AND ALBUTEROL SULFATE 1 DOSE: 2.5; .5 SOLUTION RESPIRATORY (INHALATION) at 11:24

## 2025-02-02 RX ADMIN — LEVOTHYROXINE SODIUM 100 MCG: 0.1 TABLET ORAL at 05:42

## 2025-02-02 ASSESSMENT — PAIN DESCRIPTION - ORIENTATION: ORIENTATION: RIGHT;LEFT

## 2025-02-02 ASSESSMENT — PAIN DESCRIPTION - LOCATION: LOCATION: RIB CAGE

## 2025-02-02 ASSESSMENT — PAIN SCALES - GENERAL: PAINLEVEL_OUTOF10: 5

## 2025-02-02 ASSESSMENT — PAIN DESCRIPTION - DESCRIPTORS: DESCRIPTORS: ACHING

## 2025-02-02 NOTE — PLAN OF CARE
Problem: Pain  Goal: Verbalizes/displays adequate comfort level or baseline comfort level  2/2/2025 1049 by Fang Leal RN  Outcome: Progressing  2/2/2025 0026 by Brandi Chavarria RN  Outcome: Progressing  2/1/2025 2328 by Kaitlin West LPN  Outcome: Progressing     Problem: Safety - Adult  Goal: Free from fall injury  2/2/2025 1049 by Fang Leal RN  Outcome: Progressing  2/2/2025 0026 by Brandi Chavarria RN  Outcome: Progressing  2/1/2025 2328 by Kaitlin West LPN  Outcome: Progressing

## 2025-02-02 NOTE — PROGRESS NOTES
This RN has reviewed and agrees with RUDDY West LPN's data collection and has collaborated with this LPN regarding the patient's care plan.

## 2025-02-02 NOTE — PROGRESS NOTES
Patient discharged with portable oxygen tank, belongings and discharge instructions.  All questions answered.  Left via wheelchair to private vehicle.

## 2025-02-02 NOTE — PLAN OF CARE
Problem: Pain  Goal: Verbalizes/displays adequate comfort level or baseline comfort level  2/1/2025 2328 by Kaitlin West LPN  Outcome: Progressing  2/1/2025 1243 by Fang Leal RN  Outcome: Progressing     Problem: Safety - Adult  Goal: Free from fall injury  2/1/2025 2328 by Kaitlin West LPN  Outcome: Progressing  2/1/2025 1243 by Fang Leal RN  Outcome: Progressing

## 2025-02-02 NOTE — PROGRESS NOTES
I did NOT see the patient. The patient was discussed with the NELI. I was available for questions and consultation as needed.       1LNC  AST [address]

## 2025-02-02 NOTE — PLAN OF CARE
Problem: Pain  Goal: Verbalizes/displays adequate comfort level or baseline comfort level  2/2/2025 0026 by Brandi Chavarria RN  Outcome: Progressing  2/1/2025 2328 by Kaitlin West LPN  Outcome: Progressing  2/1/2025 1243 by Fang Leal RN  Outcome: Progressing     Problem: Safety - Adult  Goal: Free from fall injury  2/2/2025 0026 by Brandi Chavarria RN  Outcome: Progressing  2/1/2025 2328 by Kaitlin West LPN  Outcome: Progressing  2/1/2025 1243 by Fang Leal RN  Outcome: Progressing

## 2025-02-02 NOTE — PROGRESS NOTES
Patient taken off O2 with Sao2 dropping to 86% on room air @ rest . O2 reapplied @ 2 lnc with Sao2 recovering to 94%

## 2025-02-02 NOTE — DISCHARGE SUMMARY
V2.0  Discharge Summary    Name:  Mindy Hurtado /Age/Sex: 1965 (59 y.o. female)   Admit Date: 2025  Discharge Date: 25    MRN & CSN:  [id number] & [id number] Encounter Date and Time 25 1:44 PM EST    Attending:  Hubert Ross MD Discharging Provider: HUMA PEÑA NP       Hospital Course:     Brief HPI:   Patient was seen and examined in the emergency department by the ED physician yesterday tested positive for influenza A was prescribed Tamiflu and discharged home.  She presents to the emergency department again today with same complaints apparently O2 saturation on room air was 88% she was placed on oxygen at 2 L O2 saturations are now 95%.  Does have a low-grade fever of 99.  She has generalized aches and pains.  Given she is requiring oxygen she will be admitted to the hospital for further treatment.     Patient seen and examined today she is doing well she has no complaints.  She remains on 1 L of oxygen.  Home O2 evaluation completed she was up walking in the hallway she did well she does require home oxygen.  She will discharge home today in stable condition with home oxygen.  She is aware to follow-up with her PCP for instructions on when to discontinue oxygen        Brief Problem Based Course:   Acute respiratory failure with hypoxia most likely secondary to influenza A and COPD exacerbation.  She is currently on 1 L nasal cannula.  Home O2 evaluation completed she does qualify for home oxygen did not have any difficulty getting up and walking in the hallway.  She will discharge home today in stable condition.    COPD exacerbation, continue steroids, inhalers.    Hypertension, improved with the addition of lisinopril today's blood pressure is 144/80 and 125/70.  Rx sent to pharmacy.  Hyperlipidemia, not taking any medications states she is allergic to statins  Urine strep antigen positive, patient is allergic to all penicillins and cephalosporins as per up-to-date and

## 2025-02-03 NOTE — CARE COORDINATION
Received referral from Bharti at [facility] to assist patient with DME required at discharge.  Patient was discharged on 02/02/2025 with home oxygen from but patient does not have insurance.  Approved a 1x voucher for 30 day supply of home oxygen and faxed voucher to [facility].      Patient will need to contact Bharti at [facility] to complete hardship application for any future assistance with her oxygen needs.     Added patient to flagged list.

## 2025-02-04 LAB
MICROORGANISM SPEC CULT: ABNORMAL
MICROORGANISM/AGENT SPEC: ABNORMAL
SPECIMEN DESCRIPTION: ABNORMAL

## 2025-02-13 DIAGNOSIS — E03.9 ACQUIRED HYPOTHYROIDISM: ICD-10-CM

## 2025-02-13 RX ORDER — LEVOTHYROXINE SODIUM 100 UG/1
100 TABLET ORAL DAILY
Qty: 90 TABLET | Refills: 1 | Status: SHIPPED | OUTPATIENT
Start: 2025-02-13

## 2025-03-02 PROBLEM — J10.1 INFLUENZA A: Status: RESOLVED | Noted: 2025-01-31 | Resolved: 2025-03-02

## 2025-03-13 ENCOUNTER — COMMUNITY OUTREACH (OUTPATIENT)
Age: 60
End: 2025-03-13

## 2025-03-13 NOTE — PROGRESS NOTES
Patient's HM shows they are overdue for Mammogram and Colonoscopy.  Care Everywhere and  files searched.  No results to attach to order nor HM updated.      Patient declined.

## 2025-07-10 ENCOUNTER — OFFICE VISIT (OUTPATIENT)
Age: 60
End: 2025-07-10

## 2025-07-10 VITALS
HEART RATE: 80 BPM | SYSTOLIC BLOOD PRESSURE: 120 MMHG | RESPIRATION RATE: 16 BRPM | HEIGHT: 66 IN | OXYGEN SATURATION: 96 % | DIASTOLIC BLOOD PRESSURE: 70 MMHG | WEIGHT: 165.8 LBS | BODY MASS INDEX: 26.65 KG/M2

## 2025-07-10 DIAGNOSIS — R73.03 PREDIABETES: Primary | ICD-10-CM

## 2025-07-10 DIAGNOSIS — R06.2 WHEEZING: ICD-10-CM

## 2025-07-10 DIAGNOSIS — E03.9 ACQUIRED HYPOTHYROIDISM: ICD-10-CM

## 2025-07-10 DIAGNOSIS — Z53.20 COLON CANCER SCREENING DECLINED: ICD-10-CM

## 2025-07-10 LAB — HBA1C MFR BLD: 5.3 %

## 2025-07-10 RX ORDER — IPRATROPIUM BROMIDE AND ALBUTEROL SULFATE 2.5; .5 MG/3ML; MG/3ML
1 SOLUTION RESPIRATORY (INHALATION) EVERY 4 HOURS
Qty: 360 ML | Refills: 0 | Status: SHIPPED | OUTPATIENT
Start: 2025-07-10

## 2025-07-10 RX ORDER — ALBUTEROL SULFATE 90 UG/1
2 INHALANT RESPIRATORY (INHALATION) 4 TIMES DAILY PRN
Qty: 18 G | Refills: 1 | Status: SHIPPED | OUTPATIENT
Start: 2025-07-10

## 2025-07-10 RX ORDER — LEVOTHYROXINE SODIUM 100 UG/1
100 TABLET ORAL DAILY
Qty: 90 TABLET | Refills: 1 | Status: CANCELLED | OUTPATIENT
Start: 2025-07-10

## 2025-07-10 ASSESSMENT — ENCOUNTER SYMPTOMS
GASTROINTESTINAL NEGATIVE: 1
RESPIRATORY NEGATIVE: 1

## 2025-07-10 ASSESSMENT — PATIENT HEALTH QUESTIONNAIRE - PHQ9
SUM OF ALL RESPONSES TO PHQ QUESTIONS 1-9: 0
1. LITTLE INTEREST OR PLEASURE IN DOING THINGS: NOT AT ALL
SUM OF ALL RESPONSES TO PHQ QUESTIONS 1-9: 0
2. FEELING DOWN, DEPRESSED OR HOPELESS: NOT AT ALL

## 2025-07-10 NOTE — PATIENT INSTRUCTIONS
We are committed to providing you the best care possible.    If you receive a survey after visiting one of our offices, please take time to share your experience concerning your physician office visit.  These surveys are confidential and no health information about you is shared.    We are eager to improve for you and continue to give you satisfactory care, we are counting on your feedback to help make that happen.            Welcome to [facility]:    Did you know we now have a faster way for you to move through your appointment? For your convenience, we now have digital registration available. When you schedule your next appointment, you will receive a link via your email as well as a text message that will allow you to complete any paperwork digitally before your appointment.

## 2025-07-10 NOTE — PROGRESS NOTES
Mindy Hurtado (:  1965) is a 59 y.o. female,Established patient, here for evaluation of the following chief complaint(s):  Follow-up (Follow up and needs medication refills)      Subjective   HPI  History of Present Illness  The patient is a 59-year-old female here for a routine 6-month follow-up.    She reports no changes in her medical history or medications since the last visit. Following with cardiology but did not have the heart cath completed due to delays and technical issues. She has made lifestyle changes since.     She has been taking levothyroxine daily. She has noticed weight loss and hair thinning recently since losing the weight. Will need lab today to assess thyroid and medication. Pt also needing refills for albuterol and nebulizer associated with wheezing. Denies SE to medications and is tolerating well.          7/10/2025     8:27 AM 1/10/2025     9:14 AM 2024     8:01 AM 3/2/2023     8:40 AM 8/3/2022     9:14 AM 2021     9:51 AM 2020     8:25 AM   PHQ Scores   PHQ2 Score 0 0 0 0 0 0 0   PHQ9 Score 0 0 0 0 0 0 0         I reviewed the medical records and past history for Mindy.    Allergies   Allergen Reactions    Keflex [Cephalexin]      Shut kidneys down    Meloxicam Shortness Of Breath    Penicillins Anaphylaxis    Statins Swelling and Other (See Comments)     Throat swelling, vomiting    Cephalosporins Itching    Sulfa Antibiotics Itching    Tramadol Itching    Aspirin      Full strength only. Can tolerate 81mg     Coconut (Cocos Nucifera)      Throat swells up    Codeine Hives and Nausea And Vomiting    Naproxen Nausea And Vomiting     Dizzy lightheaded       Current Outpatient Medications   Medication Sig Dispense Refill    albuterol sulfate HFA (VENTOLIN HFA) 108 (90 Base) MCG/ACT inhaler Inhale 2 puffs into the lungs 4 times daily as needed for Wheezing 18 g 1    ipratropium 0.5 mg-albuterol 2.5 mg (DUONEB) 0.5-2.5 (3) MG/3ML SOLN nebulizer solution Inhale 3

## 2025-07-11 LAB — TSH SERPL DL<=0.005 MIU/L-ACNC: 1.86 UIU/ML (ref 0.27–4.2)

## 2025-08-11 ENCOUNTER — HOSPITAL ENCOUNTER (EMERGENCY)
Age: 60
Discharge: HOME | End: 2025-08-11
Attending: STUDENT IN AN ORGANIZED HEALTH CARE EDUCATION/TRAINING PROGRAM

## 2025-08-11 ENCOUNTER — TELEPHONE (OUTPATIENT)
Age: 60
End: 2025-08-11

## 2025-08-11 VITALS
WEIGHT: 171.4 LBS | OXYGEN SATURATION: 95 % | HEART RATE: 88 BPM | SYSTOLIC BLOOD PRESSURE: 128 MMHG | HEIGHT: 66 IN | BODY MASS INDEX: 27.55 KG/M2 | TEMPERATURE: 97.7 F | DIASTOLIC BLOOD PRESSURE: 74 MMHG | RESPIRATION RATE: 18 BRPM

## 2025-08-11 DIAGNOSIS — I10 PRIMARY HYPERTENSION: Primary | ICD-10-CM

## 2025-08-11 PROCEDURE — 99283 EMERGENCY DEPT VISIT LOW MDM: CPT

## 2025-08-11 RX ORDER — LOSARTAN POTASSIUM 25 MG/1
25 TABLET ORAL DAILY
Qty: 30 TABLET | Refills: 0 | Status: SHIPPED | OUTPATIENT
Start: 2025-08-11

## 2025-08-11 RX ORDER — LOSARTAN POTASSIUM 25 MG/1
25 TABLET ORAL ONCE
Status: DISCONTINUED | OUTPATIENT
Start: 2025-08-11 | End: 2025-08-11 | Stop reason: HOSPADM

## 2025-08-11 ASSESSMENT — PAIN SCALES - GENERAL: PAINLEVEL_OUTOF10: 5

## 2025-08-11 ASSESSMENT — PAIN DESCRIPTION - PAIN TYPE: TYPE: ACUTE PAIN

## 2025-08-11 ASSESSMENT — PAIN DESCRIPTION - DESCRIPTORS: DESCRIPTORS: ACHING;THROBBING

## 2025-08-11 ASSESSMENT — PAIN DESCRIPTION - ORIENTATION: ORIENTATION: UPPER;LEFT

## 2025-08-11 ASSESSMENT — PAIN DESCRIPTION - FREQUENCY: FREQUENCY: CONTINUOUS

## 2025-08-11 ASSESSMENT — PAIN DESCRIPTION - LOCATION: LOCATION: HEAD

## 2025-08-11 ASSESSMENT — PAIN DESCRIPTION - ONSET: ONSET: ON-GOING

## 2025-08-11 ASSESSMENT — PAIN - FUNCTIONAL ASSESSMENT
PAIN_FUNCTIONAL_ASSESSMENT: 0-10
PAIN_FUNCTIONAL_ASSESSMENT: ACTIVITIES ARE NOT PREVENTED

## 2025-08-11 ASSESSMENT — LIFESTYLE VARIABLES
HOW MANY STANDARD DRINKS CONTAINING ALCOHOL DO YOU HAVE ON A TYPICAL DAY: PATIENT DOES NOT DRINK
HOW OFTEN DO YOU HAVE A DRINK CONTAINING ALCOHOL: MONTHLY OR LESS

## 2025-09-04 ENCOUNTER — APPOINTMENT (OUTPATIENT)
Dept: GENERAL RADIOLOGY | Age: 60
End: 2025-09-04
Attending: EMERGENCY MEDICINE

## 2025-09-04 ENCOUNTER — HOSPITAL ENCOUNTER (EMERGENCY)
Age: 60
Discharge: HOME | End: 2025-09-04
Attending: EMERGENCY MEDICINE

## 2025-09-04 VITALS
DIASTOLIC BLOOD PRESSURE: 109 MMHG | HEART RATE: 82 BPM | TEMPERATURE: 97.5 F | OXYGEN SATURATION: 96 % | SYSTOLIC BLOOD PRESSURE: 155 MMHG | RESPIRATION RATE: 16 BRPM

## 2025-09-04 DIAGNOSIS — R07.81 RIB PAIN ON LEFT SIDE: Primary | ICD-10-CM

## 2025-09-04 LAB
EKG ATRIAL RATE: 73 BPM
EKG DIAGNOSIS: NORMAL
EKG P AXIS: 53 DEGREES
EKG P-R INTERVAL: 146 MS
EKG Q-T INTERVAL: 418 MS
EKG QRS DURATION: 76 MS
EKG QTC CALCULATION (BAZETT): 460 MS
EKG R AXIS: 53 DEGREES
EKG T AXIS: 69 DEGREES
EKG VENTRICULAR RATE: 73 BPM

## 2025-09-04 PROCEDURE — 6370000000 HC RX 637 (ALT 250 FOR IP): Performed by: EMERGENCY MEDICINE

## 2025-09-04 PROCEDURE — 93010 ELECTROCARDIOGRAM REPORT: CPT | Performed by: INTERNAL MEDICINE

## 2025-09-04 PROCEDURE — 99284 EMERGENCY DEPT VISIT MOD MDM: CPT

## 2025-09-04 PROCEDURE — 93005 ELECTROCARDIOGRAM TRACING: CPT | Performed by: EMERGENCY MEDICINE

## 2025-09-04 PROCEDURE — 71101 X-RAY EXAM UNILAT RIBS/CHEST: CPT

## 2025-09-04 RX ORDER — ACETAMINOPHEN 500 MG
1000 TABLET ORAL ONCE
Status: COMPLETED | OUTPATIENT
Start: 2025-09-04 | End: 2025-09-04

## 2025-09-04 RX ORDER — LIDOCAINE 4 G/G
1 PATCH TOPICAL ONCE
Status: DISCONTINUED | OUTPATIENT
Start: 2025-09-04 | End: 2025-09-04

## 2025-09-04 RX ORDER — KETOROLAC TROMETHAMINE 15 MG/ML
15 INJECTION, SOLUTION INTRAMUSCULAR; INTRAVENOUS ONCE
Status: DISCONTINUED | OUTPATIENT
Start: 2025-09-04 | End: 2025-09-04

## 2025-09-04 RX ADMIN — ACETAMINOPHEN 1000 MG: 500 TABLET ORAL at 07:13

## 2025-09-04 ASSESSMENT — PAIN DESCRIPTION - ORIENTATION
ORIENTATION: LEFT

## 2025-09-04 ASSESSMENT — PAIN DESCRIPTION - LOCATION
LOCATION: RIB CAGE

## 2025-09-04 ASSESSMENT — PAIN DESCRIPTION - FREQUENCY: FREQUENCY: CONTINUOUS

## 2025-09-04 ASSESSMENT — PAIN - FUNCTIONAL ASSESSMENT
PAIN_FUNCTIONAL_ASSESSMENT: PREVENTS OR INTERFERES SOME ACTIVE ACTIVITIES AND ADLS
PAIN_FUNCTIONAL_ASSESSMENT: 0-10
PAIN_FUNCTIONAL_ASSESSMENT: ACTIVITIES ARE NOT PREVENTED

## 2025-09-04 ASSESSMENT — PAIN SCALES - GENERAL
PAINLEVEL_OUTOF10: 8

## 2025-09-04 ASSESSMENT — PAIN DESCRIPTION - ONSET: ONSET: SUDDEN

## 2025-09-04 ASSESSMENT — PAIN DESCRIPTION - DESCRIPTORS
DESCRIPTORS: SHARP
DESCRIPTORS: SHARP

## 2025-09-04 ASSESSMENT — PAIN DESCRIPTION - PAIN TYPE: TYPE: ACUTE PAIN

## (undated) DEVICE — GLOVE SURG SZ 8 L12IN THK75MIL DK GRN LTX FREE

## (undated) DEVICE — 4-PORT MANIFOLD: Brand: NEPTUNE 2

## (undated) DEVICE — SMARTGOWN SURGICAL GOWN, XL, LONG: Brand: CONVERTORS

## (undated) DEVICE — TUBING PMP L16FT MAIN DISP FOR AR-6400 AR-6475

## (undated) DEVICE — ELECTRODE,RADIOTRANSLUCENT,FOAM,5PK: Brand: MEDLINE

## (undated) DEVICE — SHOULDER PK

## (undated) DEVICE — WEREWOLF FLOW 90 COBLATION WAND: Brand: COBLATION

## (undated) DEVICE — SUTURE ETHLN SZ 3-0 L30IN NONABSORBABLE BLK FS-1 L24MM 3/8 669H

## (undated) DEVICE — SUTURE PROL SZ 0 L30IN NONABSORBABLE BLU L26MM CT-2 1/2 CIR 8412H

## (undated) DEVICE — SLING ARM L L165IN D75IN WHT POLY MESH ENVELOP MTL SIDE

## (undated) DEVICE — CURITY NON-ADHERENT STRIPS: Brand: CURITY

## (undated) DEVICE — SMARTSTITCH PERFECTPASSER CONNECTOR: Brand: PERFECTPASSER

## (undated) DEVICE — SMARTSTITCH PERFECTPASSER                                    MAGNUMWIRE SUTURE CARTRIDGES, WHITE: Brand: SMARTSTITCH PERFECTPASSER

## (undated) DEVICE — 3M™ STERI-DRAPE™ U-DRAPE 1067 1067 5/BX 4BX/CS/CTN&#X20;: Brand: STERI-DRAPE™

## (undated) DEVICE — [AGGRESSIVE 6-FLUTE BARREL BUR, ARTHROSCOPIC SHAVER BLADE,  DO NOT RESTERILIZE,  DO NOT USE IF PACKAGE IS DAMAGED,  KEEP DRY,  KEEP AWAY FROM SUNLIGHT]: Brand: FORMULA

## (undated) DEVICE — MAT FLOOR ULTRA ABS 28X48IN

## (undated) DEVICE — CHLORAPREP 26ML ORANGE

## (undated) DEVICE — INTENDED FOR TISSUE SEPARATION, AND OTHER PROCEDURES THAT REQUIRE A SHARP SURGICAL BLADE TO PUNCTURE OR CUT.: Brand: BARD-PARKER ® CARBON RIB-BACK BLADES

## (undated) DEVICE — SLEEVE TRAC SPANDEX LAT W/ 4IN COBAN SUPERFICIAL RAD NRV PD

## (undated) DEVICE — DRESSING ALG CA SQ 4IN LEN 4IN W MAXORB EXTRA

## (undated) DEVICE — 3M™ WARMING BLANKET, LOWER BODY, 10 PER CASE, 42568: Brand: BAIR HUGGER™

## (undated) DEVICE — GAUZE,SPONGE,4"X4",16PLY,XRAY,STRL,LF: Brand: MEDLINE

## (undated) DEVICE — SOLUTION PREP POVIDONE IOD FOR SKIN MUCOUS MEM PRIOR TO

## (undated) DEVICE — COUNTER NDL 60 COUNT FOAM STRP SGL MAG

## (undated) DEVICE — TUBING, SUCTION, 1/4" X 10', STRAIGHT: Brand: MEDLINE

## (undated) DEVICE — ANESTHESIA CIRCUIT ADULT-LF: Brand: MEDLINE INDUSTRIES, INC.

## (undated) DEVICE — BANDAGE,SELF ADHRNT,COFLEX,4"X5YD,STRL: Brand: COLABEL

## (undated) DEVICE — PAD,ABDOMINAL,8"X7.5",ST,LF,20/BX: Brand: MEDLINE INDUSTRIES, INC.

## (undated) DEVICE — [AGGRESSIVE PLUS CUTTER, ARTHROSCOPIC SHAVER BLADE,  DO NOT RESTERILIZE,  DO NOT USE IF PACKAGE IS DAMAGED,  KEEP DRY,  KEEP AWAY FROM SUNLIGHT]: Brand: FORMULA

## (undated) DEVICE — YANKAUER,FLEXIBLE HANDLE,REGLR CAPACITY: Brand: MEDLINE INDUSTRIES, INC.

## (undated) DEVICE — GLOVE ORANGE PI 7 1/2   MSG9075

## (undated) DEVICE — SMARTSTITCH PERFECTPASSER                                    MAGNUMWIRE SUTURE CARTRIDGES, BLUE CO-BRAID: Brand: SMARTSTITCH PERFECTPASSER